# Patient Record
Sex: FEMALE | Race: WHITE | NOT HISPANIC OR LATINO | Employment: FULL TIME | URBAN - METROPOLITAN AREA
[De-identification: names, ages, dates, MRNs, and addresses within clinical notes are randomized per-mention and may not be internally consistent; named-entity substitution may affect disease eponyms.]

---

## 2017-01-19 ENCOUNTER — TELEPHONE (OUTPATIENT)
Dept: FAMILY MEDICINE | Facility: CLINIC | Age: 52
End: 2017-01-19

## 2017-01-19 DIAGNOSIS — I10 HYPERTENSION GOAL BP (BLOOD PRESSURE) < 140/90: Primary | ICD-10-CM

## 2017-01-19 RX ORDER — LOSARTAN POTASSIUM AND HYDROCHLOROTHIAZIDE 25; 100 MG/1; MG/1
1 TABLET ORAL DAILY
Qty: 90 TABLET | Refills: 4 | Status: CANCELLED | OUTPATIENT
Start: 2017-01-19

## 2017-01-19 NOTE — TELEPHONE ENCOUNTER
losartan-hydrochlorothiazide (HYZAAR) 100-25 MG per tablet      Last Written Prescription Date: 1/18/16  Last Fill Quantity: 90, # refills: 4  Last Office Visit with G, P or [facility] prescribing provider: 1/18/16   Next 5 appointments (look out 90 days)     Feb 24, 2017 10:30 AM   PHYSICAL with Farida Cardona MD   [facility] ([facility]    [address] [address]   [phone]                   POTASSIUM   Date Value Ref Range Status   01/18/2016 4.3 3.4 - 5.3 mmol/L Final     CREATININE   Date Value Ref Range Status   01/18/2016 0.59 0.52 - 1.04 mg/dL Final     BP Readings from Last 3 Encounters:   01/18/16 138/80   02/25/15 142/86   12/01/14 136/74

## 2017-01-20 ENCOUNTER — MYC MEDICAL ADVICE (OUTPATIENT)
Dept: FAMILY MEDICINE | Facility: CLINIC | Age: 52
End: 2017-01-20

## 2017-01-20 DIAGNOSIS — I10 HYPERTENSION GOAL BP (BLOOD PRESSURE) < 140/90: Primary | ICD-10-CM

## 2017-01-20 NOTE — TELEPHONE ENCOUNTER
losartan-hydrochlorothiazide (HYZAAR) 100-25 MG per tablet      Last Written Prescription Date: 1/18/2016  Last Fill Quantity: 90, # refills: 4  Last Office Visit with [facility] or  Health prescribing provider: 1/18/2016   Next 5 appointments (look out 90 days)     Feb 24, 2017 10:30 AM   PHYSICAL with Farida Cardona MD   [facility]    [address] [address]   [phone]                   POTASSIUM   Date Value Ref Range Status   01/18/2016 4.3 3.4 - 5.3 mmol/L Final     CREATININE   Date Value Ref Range Status   01/18/2016 0.59 0.52 - 1.04 mg/dL Final     BP Readings from Last 3 Encounters:   01/18/16 138/80   02/25/15 142/86   12/01/14 136/74     carvedilol (COREG CR) 20 MG 24 hr capsule      Last Written Prescription Date: 12/16/2016  Last Fill Quantity: 90, # refills: 0  Last Office Visit with [facility] or  Health prescribing provider: 1/18/2016   Next 5 appointments (look out 90 days)     Feb 24, 2017 10:30 AM   PHYSICAL with Farida Cardona MD   [facility] ([facility]    [address] [address]   [phone]                   POTASSIUM   Date Value Ref Range Status   01/18/2016 4.3 3.4 - 5.3 mmol/L Final     CREATININE   Date Value Ref Range Status   01/18/2016 0.59 0.52 - 1.04 mg/dL Final     BP Readings from Last 3 Encounters:   01/18/16 138/80   02/25/15 142/86   12/01/14 136/74

## 2017-01-24 RX ORDER — LOSARTAN POTASSIUM AND HYDROCHLOROTHIAZIDE 25; 100 MG/1; MG/1
1 TABLET ORAL DAILY
Qty: 90 TABLET | Refills: 0 | Status: SHIPPED | OUTPATIENT
Start: 2017-01-24 | End: 2017-02-24

## 2017-01-24 RX ORDER — CARVEDILOL PHOSPHATE 20 MG/1
20 CAPSULE, EXTENDED RELEASE ORAL DAILY
Qty: 90 CAPSULE | Refills: 0 | Status: SHIPPED | OUTPATIENT
Start: 2017-01-24 | End: 2017-02-24

## 2017-01-24 NOTE — TELEPHONE ENCOUNTER
Prescription approved per [facility] Refill Protocol.  Patient due for office visit for further refills. [employer] message sent to patient.     Sindhu Gonzalez RN

## 2017-02-21 NOTE — PATIENT INSTRUCTIONS
Preventive Health Recommendations  Female Ages 50 - 64    Yearly exam: See your health care provider every year in order to  o Review health changes.   o Discuss preventive care.    o Review your medicines if your doctor has prescribed any.      Get a Pap test every three years (unless you have an abnormal result and your provider advises testing more often).    If you get Pap tests with HPV test, you only need to test every 5 years, unless you have an abnormal result.     You do not need a Pap test if your uterus was removed (hysterectomy) and you have not had cancer.    You should be tested each year for STDs (sexually transmitted diseases) if you're at risk.     Have a mammogram every 1 to 2 years.    Have a colonoscopy at age 50, or have a yearly FIT test (stool test). These exams screen for colon cancer.      Have a cholesterol test every 5 years, or more often if advised.    Have a diabetes test (fasting glucose) every three years. If you are at risk for diabetes, you should have this test more often.     If you are at risk for osteoporosis (brittle bone disease), think about having a bone density scan (DEXA).    Shots: Get a flu shot each year. Get a tetanus shot every 10 years.    Nutrition:     Eat at least 5 servings of fruits and vegetables each day.    Eat whole-grain bread, whole-wheat pasta and brown rice instead of white grains and rice.    Talk to your provider about Calcium and Vitamin D.     Lifestyle    Exercise at least 150 minutes a week (30 minutes a day, 5 days a week). This will help you control your weight and prevent disease.    Limit alcohol to one drink per day.    No smoking.     Wear sunscreen to prevent skin cancer.     See your dentist every six months for an exam and cleaning.    See your eye doctor every 1 to 2 years.    [facility]    If you have any questions regarding to your visit please contact your care team:       Team Purple:   Clinic Hours Telephone Number    [address] Dr.   7am-7pm  Monday - Thursday   7am-5pm  Fridays  [phone]  (Appointment scheduling available 24/7)    Questions about your Visit?   Team Line:  [phone]   [facility] [place] [facility] 11am-9pm Monday-Friday Saturday-Sunday- 9am-5pm   [place] - 5pm-9pm Monday-Friday Saturday-Sunday- 9am-5pm  [phone] - [place]   [phone] - [place]       What options do I have for visits at the clinic other than the traditional office visit?  To expand how we care for you, many of our providers are utilizing electronic visits (e-visits) and telephone visits, when medically appropriate, for interactions with their patients rather than a visit in the clinic.   We also offer nurse visits for many medical concerns. Just like any other service, we will bill your insurance company for this type of visit based on time spent on the phone with your provider. Not all insurance companies cover these visits. Please check with your medical insurance if this type of visit is covered. You will be responsible for any charges that are not paid by your insurance.      E-visits via [employer]:  generally incur a $35.00 fee.  Telephone visits:  Time spent on the phone: *charged based on time that is spent on the phone in increments of 10 minutes. Estimated cost:   5-10 mins $30.00   11-20 mins. $59.00   21-30 mins. $85.00     Use [employer] (secure email communication and access to your chart) to send your primary care provider a message or make an appointment. Ask someone on your Team how to sign up for [employer].  For a Price Quote for your services, please call our Consumer Price Line at [phone].  As always, Thank you for trusting us with your health care needs!

## 2017-02-21 NOTE — PROGRESS NOTES
SUBJECTIVE:     CC: Juanita Lechuga is an 51 year old woman who presents for preventive health visit.     Healthy Habits:    Do you get at least three servings of calcium containing foods daily (dairy, green leafy vegetables, etc.)? yes    Amount of exercise or daily activities, outside of work: 0 day(s) per week    Problems taking medications regularly No    Medication side effects: No    Have you had an eye exam in the past two years? yes    Do you see a dentist twice per year? yes    Do you have sleep apnea, excessive snoring or daytime drowsiness?no             Today's PHQ-2 Score:   PHQ-2 ( 1999 [employer]) 2/24/2017 1/18/2016   Q1: Little interest or pleasure in doing things 0 0   Q2: Feeling down, depressed or hopeless 0 0   PHQ-2 Score 0 0   Little interest or pleasure in doing things - -   Feeling down, depressed or hopeless - -   PHQ-2 Score - -       Abuse: Current or Past(Physical, Sexual or Emotional)- No  Do you feel safe in your environment - Yes    Social History   Substance Use Topics     Smoking status: Former Smoker     Packs/day: 0.50     Years: 27.00     Types: Cigarettes     Quit date: 10/24/2011     Smokeless tobacco: Never Used     Alcohol use Yes      Comment: rare     The patient does not drink >3 drinks per day nor >7 drinks per week.    Recent Labs   Lab Test  01/18/16   1057  12/11/12   0702  09/07/10   0724   CHOL  197  158  177   HDL  72  44*  48*   LDL  113*  103  117   TRIG  62  53  61   CHOLHDLRATIO   --   3.6  3.7   NHDL  125   --    --        Reviewed orders with patient.  Reviewed health maintenance and updated orders accordingly - Yes    Mammo Decision Support:  Patient over age 50, mutual decision to screen reflected in health maintenance.    Pertinent mammograms are reviewed under the imaging tab.  History of abnormal Pap smear: NO - age 30-65 PAP every 5 years with negative HPV co-testing recommended  All Histories reviewed and updated in Epic.    Immunization History    Administered Date(s) Administered     Hepatitis A Vac Ped/Adol-2 Dose 05/08/2007, 06/06/2008, 06/22/2009     Influenza (IIV3) 09/25/2011, 10/05/2012, 11/13/2013, 10/15/2014, 11/20/2015, 11/02/2016     Pneumococcal 23 valent 03/07/2012, 03/08/2013     Tdap (Adacel,Boostrix) 05/08/2007, 06/06/2008      ROS:  This 51 year old female is here today for annual female exam. She has gained 20 pounds since last year and she is finally getting back into her good mind set to lose weight and start water exercises again. She is in a friend's wedding in August and needs to lose weight so she is motivated. She admits to eating pizza last night which is high is salt. She usually doesn't eat a lot of pizza. She makes her own protein bars from scratch and is back on a weight loss program that involves drinking a lot of green tea. She used to swim competitively in high school and in the community where she grew up in [place]. She knows she needs to get back into the pool. Hot flashes are bothering her so swimming is the best exercise for her.  All other review of systems are negative  Personal, family, and social history reviewed with patient and revised.    C: NEGATIVE for fever, chills, change in weight  I: NEGATIVE for worrisome rashes, moles or lesions  E: NEGATIVE for vision changes or irritation  ENT: NEGATIVE for ear, mouth and throat problems  R: NEGATIVE for significant cough or SOB  B: NEGATIVE for masses, tenderness or discharge  CV: NEGATIVE for chest pain, palpitations or peripheral edema  GI: NEGATIVE for nausea, abdominal pain, heartburn, or change in bowel habits  : NEGATIVE for unusual urinary or vaginal symptoms. No vaginal bleeding.  M: NEGATIVE for significant arthralgias or myalgia  N: NEGATIVE for weakness, dizziness or paresthesias  P: NEGATIVE for changes in mood or affect     Problem list, Medication list, Allergies, and Medical/Social/Surgical histories reviewed in EPIC and updated as appropriate.  Labs  reviewed in EPIC  BP Readings from Last 3 Encounters:   02/24/17 142/80   01/18/16 138/80   02/25/15 142/86    Wt Readings from Last 3 Encounters:   02/24/17 197 lb 9.6 oz (89.6 kg)   01/18/16 178 lb 8 oz (81 kg)   02/25/15 185 lb (83.9 kg)                  Patient Active Problem List   Diagnosis     Old disruption of anterior cruciate ligament     CARDIOVASCULAR SCREENING; LDL GOAL LESS THAN 130     Advanced directives, counseling/discussion     Situational mixed anxiety and depressive disorder     Adjustment disorder with mixed anxiety and depressed mood     Anxiety     Past Surgical History   Procedure Laterality Date     Bunionectomy rt/lt  8/7/2008     (L) first toe     Bunionectomy rt/lt  9/20/12     right first toe       Social History   Substance Use Topics     Smoking status: Former Smoker     Packs/day: 0.50     Years: 27.00     Types: Cigarettes     Quit date: 10/24/2011     Smokeless tobacco: Never Used     Alcohol use Yes      Comment: rare     Family History   Problem Relation Age of Onset     C.A.D. Father      Coronary Artery Disease Father      CANCER Maternal Grandmother          Current Outpatient Prescriptions   Medication Sig Dispense Refill     carvedilol (COREG CR) 20 MG 24 hr capsule Take 1 capsule (20 mg) by mouth daily 90 capsule 4     losartan-hydrochlorothiazide (HYZAAR) 100-25 MG per tablet Take 1 tablet by mouth daily 90 tablet 4     fluticasone (FLONASE) 50 MCG/ACT spray USE ONE TO TWO SPRAY(S) IN EACH NOSTRIL ONCE DAILY 1 Bottle 3     citalopram (CELEXA) 20 MG tablet Take 0.5 tablets (10 mg) by mouth daily 45 tablet 4     MULTI-VITAMIN OR TABS 1 TABLET DAILY       [DISCONTINUED] carvedilol (COREG CR) 20 MG 24 hr capsule Take 1 capsule (20 mg) by mouth daily 90 capsule 0     [DISCONTINUED] losartan-hydrochlorothiazide (HYZAAR) 100-25 MG per tablet Take 1 tablet by mouth daily 90 tablet 0     [DISCONTINUED] citalopram (CELEXA) 20 MG tablet Take 0.5 tablets (10 mg) by mouth daily 45  "tablet 4     OBJECTIVE:     /80  Pulse 74  Temp 98.1  F (36.7  C)  Ht 5' 2\" (1.575 m)  Wt 197 lb 9.6 oz (89.6 kg)  SpO2 97%  BMI 36.14 kg/m2  EXAM:  GENERAL APPEARANCE: healthy, alert and no distress, but she has an anxious rapid speech. This is her norm  EYES: Eyes grossly normal to inspection, PERRL and conjunctivae and sclerae normal  HENT: ear canals and TM's normal, nose and mouth without ulcers or lesions, oropharynx clear and oral mucous membranes moist  NECK: no adenopathy, no asymmetry, masses, or scars and thyroid normal to palpation  RESP: lungs clear to auscultation - no rales, rhonchi or wheezes  BREAST: normal without masses, tenderness or nipple discharge and no palpable axillary masses or adenopathy  CV: regular rate and rhythm, normal S1 S2, no S3 or S4, no murmur, click or rub, no peripheral edema and peripheral pulses strong  ABDOMEN: soft, nontender, no hepatosplenomegaly, no masses and bowel sounds normal   (female): normal female external genitalia, normal urethral meatus, vaginal mucosal atrophy noted, normal cervix, adnexae, and uterus without masses or abnormal discharge  MS: no musculoskeletal defects are noted and gait is age appropriate without ataxia  SKIN: no suspicious lesions or rashes  NEURO: Normal strength and tone, sensory exam grossly normal, mentation intact and speech normal  PSYCH: mentation appears normal and affect normal/bright    ASSESSMENT/PLAN:     1. Encounter for routine adult health examination without abnormal findings  Healthy but very overweight. She seems motivated this time with a friend's wedding coming up     2. Seasonal allergic rhinitis, unspecified allergic rhinitis trigger  Good control   - fluticasone (FLONASE) 50 MCG/ACT spray; USE ONE TO TWO SPRAY(S) IN EACH NOSTRIL ONCE DAILY  Dispense: 1 Bottle; Refill: 3    3. Situational mixed anxiety and depressive disorder  Good control   PHQ-9 score:    PHQ-9 SCORE 2/24/2017   Total Score -   Total Score 0 " "    - citalopram (CELEXA) 20 MG tablet; Take 0.5 tablets (10 mg) by mouth daily  Dispense: 45 tablet; Refill: 4    4. Benign essential hypertension  Fair control. She is on plenty of medication. Now she needs to improve her diet and reduce salt intake   - carvedilol (COREG CR) 20 MG 24 hr capsule; Take 1 capsule (20 mg) by mouth daily  Dispense: 90 capsule; Refill: 4  - losartan-hydrochlorothiazide (HYZAAR) 100-25 MG per tablet; Take 1 tablet by mouth daily  Dispense: 90 tablet; Refill: 4  - Basic metabolic panel  - TSH with free T4 reflex    5. Anxiety  Good control   - citalopram (CELEXA) 20 MG tablet; Take 0.5 tablets (10 mg) by mouth daily  Dispense: 45 tablet; Refill: 4    6. CARDIOVASCULAR SCREENING; LDL GOAL LESS THAN 130  She is not fasting today. She had a banana and protein bar  - Lipid panel reflex to direct LDL  - TSH with free T4 reflex    7. Need for hepatitis C screening test  due  - Hepatitis C Screen Reflex to HCV RNA Quant and Genotype    8. Screen for colon cancer  due  - GASTROENTEROLOGY ADULT REF PROCEDURE ONLY    9. Screening for malignant neoplasm of cervix  due  - Pap imaged thin layer screen with HPV - recommended age 30 - 65 years (select HPV order below)  - HPV High Risk Types DNA Cervical    COUNSELING:   Reviewed preventive health counseling, as reflected in patient instructions       Regular exercise       Healthy diet/nutrition         reports that she quit smoking about 5 years ago. Her smoking use included Cigarettes. She has a 13.50 pack-year smoking history. She has never used smokeless tobacco.    Estimated body mass index is 36.14 kg/(m^2) as calculated from the following:    Height as of this encounter: 5' 2\" (1.575 m).    Weight as of this encounter: 197 lb 9.6 oz (89.6 kg).   Weight management plan: Discussed healthy diet and exercise guidelines and patient will follow up in 6 months in clinic to re-evaluate.    Counseling Resources:  ATP IV Guidelines  Pooled Cohorts Equation " Calculator  Breast Cancer Risk Calculator  FRAX Risk Assessment  ICSI Preventive Guidelines  Dietary Guidelines for Americans, 2010  [employer]'s MyPlate  ASA Prophylaxis  Lung CA Screening    LUCIANO KIM MD  [facility]

## 2017-02-24 ENCOUNTER — OFFICE VISIT (OUTPATIENT)
Dept: FAMILY MEDICINE | Facility: CLINIC | Age: 52
End: 2017-02-24
Payer: COMMERCIAL

## 2017-02-24 ENCOUNTER — RADIANT APPOINTMENT (OUTPATIENT)
Dept: MAMMOGRAPHY | Facility: CLINIC | Age: 52
End: 2017-02-24
Attending: FAMILY MEDICINE
Payer: COMMERCIAL

## 2017-02-24 VITALS
SYSTOLIC BLOOD PRESSURE: 142 MMHG | TEMPERATURE: 98.1 F | OXYGEN SATURATION: 97 % | DIASTOLIC BLOOD PRESSURE: 80 MMHG | WEIGHT: 197.6 LBS | BODY MASS INDEX: 36.36 KG/M2 | HEIGHT: 62 IN | HEART RATE: 74 BPM

## 2017-02-24 DIAGNOSIS — Z12.11 SCREEN FOR COLON CANCER: ICD-10-CM

## 2017-02-24 DIAGNOSIS — I10 BENIGN ESSENTIAL HYPERTENSION: ICD-10-CM

## 2017-02-24 DIAGNOSIS — F41.9 ANXIETY: ICD-10-CM

## 2017-02-24 DIAGNOSIS — Z12.4 SCREENING FOR MALIGNANT NEOPLASM OF CERVIX: ICD-10-CM

## 2017-02-24 DIAGNOSIS — Z13.6 CARDIOVASCULAR SCREENING; LDL GOAL LESS THAN 130: ICD-10-CM

## 2017-02-24 DIAGNOSIS — E66.09 NON MORBID OBESITY DUE TO EXCESS CALORIES: ICD-10-CM

## 2017-02-24 DIAGNOSIS — F43.23 SITUATIONAL MIXED ANXIETY AND DEPRESSIVE DISORDER: ICD-10-CM

## 2017-02-24 DIAGNOSIS — Z12.31 VISIT FOR SCREENING MAMMOGRAM: ICD-10-CM

## 2017-02-24 DIAGNOSIS — Z00.00 ENCOUNTER FOR ROUTINE ADULT HEALTH EXAMINATION WITHOUT ABNORMAL FINDINGS: Primary | ICD-10-CM

## 2017-02-24 DIAGNOSIS — Z11.59 NEED FOR HEPATITIS C SCREENING TEST: ICD-10-CM

## 2017-02-24 DIAGNOSIS — J30.2 SEASONAL ALLERGIC RHINITIS, UNSPECIFIED ALLERGIC RHINITIS TRIGGER: ICD-10-CM

## 2017-02-24 LAB
ANION GAP SERPL CALCULATED.3IONS-SCNC: 5 MMOL/L (ref 3–14)
BUN SERPL-MCNC: 16 MG/DL (ref 7–30)
CALCIUM SERPL-MCNC: 9.1 MG/DL (ref 8.5–10.1)
CHLORIDE SERPL-SCNC: 103 MMOL/L (ref 94–109)
CHOLEST SERPL-MCNC: 209 MG/DL
CO2 SERPL-SCNC: 29 MMOL/L (ref 20–32)
CREAT SERPL-MCNC: 0.48 MG/DL (ref 0.52–1.04)
GFR SERPL CREATININE-BSD FRML MDRD: ABNORMAL ML/MIN/1.7M2
GLUCOSE SERPL-MCNC: 92 MG/DL (ref 70–99)
HCV AB SERPL QL IA: NORMAL
HDLC SERPL-MCNC: 80 MG/DL
LDLC SERPL CALC-MCNC: 115 MG/DL
NONHDLC SERPL-MCNC: 129 MG/DL
POTASSIUM SERPL-SCNC: 3.9 MMOL/L (ref 3.4–5.3)
SODIUM SERPL-SCNC: 137 MMOL/L (ref 133–144)
TRIGL SERPL-MCNC: 71 MG/DL
TSH SERPL DL<=0.005 MIU/L-ACNC: 1.84 MU/L (ref 0.4–4)

## 2017-02-24 PROCEDURE — G0145 SCR C/V CYTO,THINLAYER,RESCR: HCPCS | Performed by: FAMILY MEDICINE

## 2017-02-24 PROCEDURE — 84443 ASSAY THYROID STIM HORMONE: CPT | Performed by: FAMILY MEDICINE

## 2017-02-24 PROCEDURE — G0202 SCR MAMMO BI INCL CAD: HCPCS | Mod: TC

## 2017-02-24 PROCEDURE — 87624 HPV HI-RISK TYP POOLED RSLT: CPT | Performed by: FAMILY MEDICINE

## 2017-02-24 PROCEDURE — 80048 BASIC METABOLIC PNL TOTAL CA: CPT | Performed by: FAMILY MEDICINE

## 2017-02-24 PROCEDURE — 86803 HEPATITIS C AB TEST: CPT | Performed by: FAMILY MEDICINE

## 2017-02-24 PROCEDURE — 99396 PREV VISIT EST AGE 40-64: CPT | Performed by: FAMILY MEDICINE

## 2017-02-24 PROCEDURE — 80061 LIPID PANEL: CPT | Performed by: FAMILY MEDICINE

## 2017-02-24 PROCEDURE — 36415 COLL VENOUS BLD VENIPUNCTURE: CPT | Performed by: FAMILY MEDICINE

## 2017-02-24 RX ORDER — LOSARTAN POTASSIUM AND HYDROCHLOROTHIAZIDE 25; 100 MG/1; MG/1
1 TABLET ORAL DAILY
Qty: 90 TABLET | Refills: 4 | Status: SHIPPED | OUTPATIENT
Start: 2017-02-24 | End: 2018-03-23

## 2017-02-24 RX ORDER — FLUTICASONE PROPIONATE 50 MCG
SPRAY, SUSPENSION (ML) NASAL
Qty: 1 BOTTLE | Refills: 3 | Status: SHIPPED | OUTPATIENT
Start: 2017-02-24 | End: 2018-04-08

## 2017-02-24 RX ORDER — CARVEDILOL PHOSPHATE 20 MG/1
20 CAPSULE, EXTENDED RELEASE ORAL DAILY
Qty: 90 CAPSULE | Refills: 4 | Status: SHIPPED | OUTPATIENT
Start: 2017-02-24 | End: 2018-03-21

## 2017-02-24 RX ORDER — CITALOPRAM HYDROBROMIDE 20 MG/1
10 TABLET ORAL DAILY
Qty: 45 TABLET | Refills: 4 | Status: SHIPPED | OUTPATIENT
Start: 2017-02-24 | End: 2017-10-05

## 2017-02-24 NOTE — LETTER
My Depression Action Plan  Name: Juanita Lechuga   Date of Birth 1965  Date: 2/24/2017    My doctor: Farida Cardona   My clinic: [address]  [place] [address]  [phone]          GREEN    ZONE   Good Control    What it looks like:     Things are going generally well. You have normal up s and down s. You may even feel depressed from time to time, but bad moods usually last less than a day.   What you need to do:  1. Continue to care for yourself (see self care plan)  2. Check your depression survival kit and update it as needed  3. Follow your physician s recommendations including any medication.  4. Do not stop taking medication unless you consult with your physician first.           YELLOW         ZONE Getting Worse    What it looks like:     Depression is starting to interfere with your life.     It may be hard to get out of bed; you may be starting to isolate yourself from others.    Symptoms of depression are starting to last most all day and this has happened for several days.     You may have suicidal thoughts but they are not constant.   What you need to do:     1. Call your care team, your response to treatment will improve if you keep your care team informed of your progress. Yellow periods are signs an adjustment may need to be made.     2. Continue your self-care, even if you have to fake it!    3. Talk to someone in your support network    4. Open up your depression survival kit           RED    ZONE Medical Alert - Get Help    What it looks like:     Depression is seriously interfering with your life.     You may experience these or other symptoms: You can t get out of bed most days, can t work or engage in other necessary activities, you have trouble taking care of basic hygiene, or basic responsibilities, thoughts of suicide or death that will not go away, self-injurious behavior.     What you need to do:  1. Call your care team and  request a same-day appointment. If they are not available (weekends or after hours) call your local crisis line, emergency room or 911.      Electronically signed by: LUCIANO KIM, February 24, 2017    Depression Self Care Plan / Survival Kit    Self-Care for Depression  Here s the deal. Your body and mind are really not as separate as most people think.  What you do and think affects how you feel and how you feel influences what you do and think. This means if you do things that people who feel good do, it will help you feel better.  Sometimes this is all it takes.  There is also a place for medication and therapy depending on how severe your depression is, so be sure to consult with your medical provider and/ or Behavioral Health Consultant if your symptoms are worsening or not improving.     In order to better manage my stress, I will:    Exercise  Get some form of exercise, every day. This will help reduce pain and release endorphins, the  feel good  chemicals in your brain. This is almost as good as taking antidepressants!  This is not the same as joining a gym and then never going! (they count on that by the way ) It can be as simple as just going for a walk or doing some gardening, anything that will get you moving.      Hygiene   Maintain good hygiene (Get out of bed in the morning, Make your bed, Brush your teeth, Take a shower, and Get dressed like you were going to work, even if you are unemployed).  If your clothes don't fit try to get ones that do.    Diet  I will strive to eat foods that are good for me, drink plenty of water, and avoid excessive sugar, caffeine, alcohol, and other mood-altering substances.  Some foods that are helpful in depression are: complex carbohydrates, B vitamins, flaxseed, fish or fish oil, fresh fruits and vegetables.    Psychotherapy  I agree to participate in Individual Therapy (if recommended).    Medication  If prescribed medications, I agree to take them.  Missing  doses can result in serious side effects.  I understand that drinking alcohol, or other illicit drug use, may cause potential side effects.  I will not stop my medication abruptly without first discussing it with my provider.    Staying Connected With Others  I will stay in touch with my friends, family members, and my primary care provider/team.    Use your imagination  Be creative.  We all have a creative side; it doesn t matter if it s oil painting, sand castles, or mud pies! This will also kick up the endorphins.    Witness Beauty  (AKA stop and smell the roses) Take a look outside, even in mid-winter. Notice colors, textures. Watch the squirrels and birds.     Service to others  Be of service to others.  There is always someone else in need.  By helping others we can  get out of ourselves  and remember the really important things.  This also provides opportunities for practicing all the other parts of the program.    Humor  Laugh and be silly!  Adjust your TV habits for less news and crime-drama and more comedy.    Control your stress  Try breathing deep, massage therapy, biofeedback, and meditation. Find time to relax each day.     My support system    Clinic Contact:  Phone number:    Contact 1:  Phone number:    Contact 2:  Phone number:    Sabianist/:  Phone number:    Therapist:  Phone number:    Local crisis center:    Phone number:    Other community support:  Phone number:

## 2017-02-24 NOTE — NURSING NOTE
"Chief Complaint   Patient presents with     Physical       Initial /80 (BP Location: Right arm, Patient Position: Chair, Cuff Size: Adult Large)  Pulse 74  Temp 98.1  F (36.7  C)  Ht 5' 2\" (1.575 m)  Wt 197 lb 9.6 oz (89.6 kg)  SpO2 97%  BMI 36.14 kg/m2 Estimated body mass index is 36.14 kg/(m^2) as calculated from the following:    Height as of this encounter: 5' 2\" (1.575 m).    Weight as of this encounter: 197 lb 9.6 oz (89.6 kg).  Medication Reconciliation: complete   Marilu Dsouza MA      "

## 2017-02-24 NOTE — LETTER
[facility]  [address] [place] [address]    February 27, 2017    Juanita Lechuga  [address]  [place] [address]          Dear Juanita,    All of your lab tests are looking very good. I wish you well with you new diet plans.     Enclosed is a copy of your results.     Results for orders placed or performed in visit on 02/24/17   Basic metabolic panel   Result Value Ref Range    Sodium 137 133 - 144 mmol/L    Potassium 3.9 3.4 - 5.3 mmol/L    Chloride 103 94 - 109 mmol/L    Carbon Dioxide 29 20 - 32 mmol/L    Anion Gap 5 3 - 14 mmol/L    Glucose 92 70 - 99 mg/dL    Urea Nitrogen 16 7 - 30 mg/dL    Creatinine 0.48 (L) 0.52 - 1.04 mg/dL    GFR Estimate >90  Non  GFR Calc   >60 mL/min/1.7m2    GFR Estimate If Black >90   GFR Calc   >60 mL/min/1.7m2    Calcium 9.1 8.5 - 10.1 mg/dL   Lipid panel reflex to direct LDL   Result Value Ref Range    Cholesterol 209 (H) <200 mg/dL    Triglycerides 71 <150 mg/dL    HDL Cholesterol 80 >49 mg/dL    LDL Cholesterol Calculated 115 (H) <100 mg/dL    Non HDL Cholesterol 129 <130 mg/dL   TSH with free T4 reflex   Result Value Ref Range    TSH 1.84 0.40 - 4.00 mU/L   Hepatitis C Screen Reflex to HCV RNA Quant and Genotype   Result Value Ref Range    Hepatitis C Antibody  NR     Nonreactive   Assay performance characteristics have not been established for newborns,   infants, and children         If you have any questions or concerns, please call myself or my nurse at [phone].      Sincerely,        Farida Cardona MD/ROBERT

## 2017-02-24 NOTE — LETTER
[facility]  [address] [facility]  [place] [address]  [phone]      February 27, 2017      Juanita Lechuga  [address]  [facility] [address]        Dear Juanita,    Please make an appointment to see me again prior to your trip to [facility] this summer. I want to make sure your blood pressure is under better control prior to your travel. I wish you well.           Sincerely,      LUCIANO KIM M.D.

## 2017-02-24 NOTE — MR AVS SNAPSHOT
After Visit Summary   2/24/2017    Juanita Lechuga    MRN: [id number]           Patient Information     Date Of Birth          1965        Visit Information        Provider Department      2/24/2017 10:30 AM Farida Cardona MD [facility]        Today's Diagnoses     Encounter for routine adult health examination without abnormal findings    -  1    Seasonal allergic rhinitis, unspecified allergic rhinitis trigger        Situational mixed anxiety and depressive disorder        Benign essential hypertension        Anxiety        CARDIOVASCULAR SCREENING; LDL GOAL LESS THAN 130        Need for hepatitis C screening test        Screen for colon cancer        Screening for malignant neoplasm of cervix          Care Instructions      Preventive Health Recommendations  Female Ages 50 - 64    Yearly exam: See your health care provider every year in order to  o Review health changes.   o Discuss preventive care.    o Review your medicines if your doctor has prescribed any.      Get a Pap test every three years (unless you have an abnormal result and your provider advises testing more often).    If you get Pap tests with HPV test, you only need to test every 5 years, unless you have an abnormal result.     You do not need a Pap test if your uterus was removed (hysterectomy) and you have not had cancer.    You should be tested each year for STDs (sexually transmitted diseases) if you're at risk.     Have a mammogram every 1 to 2 years.    Have a colonoscopy at age 50, or have a yearly FIT test (stool test). These exams screen for colon cancer.      Have a cholesterol test every 5 years, or more often if advised.    Have a diabetes test (fasting glucose) every three years. If you are at risk for diabetes, you should have this test more often.     If you are at risk for osteoporosis (brittle bone disease), think about having a bone density scan (DEXA).    Shots: Get a flu shot each year. Get  a tetanus shot every 10 years.    Nutrition:     Eat at least 5 servings of fruits and vegetables each day.    Eat whole-grain bread, whole-wheat pasta and brown rice instead of white grains and rice.    Talk to your provider about Calcium and Vitamin D.     Lifestyle    Exercise at least 150 minutes a week (30 minutes a day, 5 days a week). This will help you control your weight and prevent disease.    Limit alcohol to one drink per day.    No smoking.     Wear sunscreen to prevent skin cancer.     See your dentist every six months for an exam and cleaning.    See your eye doctor every 1 to 2 years.    [facility]    If you have any questions regarding to your visit please contact your care team:       Team Purple:   Clinic Hours Telephone Number   [address] Dr.   7am-7pm  Monday - Thursday   7am-5pm  Fridays  [phone]  (Appointment scheduling available 24/7)    Questions about your Visit?   Team Line:  [phone]   Urgent Care - [place] [facility] - 11am-9pm Monday-Friday Saturday-Sunday- 9am-5pm   [place] - 5pm-9pm Monday-Friday Saturday-Sunday- 9am-5pm  [phone] - [place]   [phone] - [place]       What options do I have for visits at the clinic other than the traditional office visit?  To expand how we care for you, many of our providers are utilizing electronic visits (e-visits) and telephone visits, when medically appropriate, for interactions with their patients rather than a visit in the clinic.   We also offer nurse visits for many medical concerns. Just like any other service, we will bill your insurance company for this type of visit based on time spent on the phone with your provider. Not all insurance companies cover these visits. Please check with your medical insurance if this type of visit is covered. You will be responsible for any charges that are not paid by your insurance.       E-visits via [employer]:  generally incur a $35.00 fee.  Telephone visits:  Time spent on the phone: *charged based on time that is spent on the phone in increments of 10 minutes. Estimated cost:   5-10 mins $30.00   11-20 mins. $59.00   21-30 mins. $85.00     Use [employer] (secure email communication and access to your chart) to send your primary care provider a message or make an appointment. Ask someone on your Team how to sign up for [employer].  For a Price Quote for your services, please call our magnetic.io Line at [phone].  As always, Thank you for trusting us with your health care needs!              Follow-ups after your visit        Additional Services     GASTROENTEROLOGY ADULT REF PROCEDURE ONLY       Last Lab Result: Creatinine (mg/dL)       Date                     Value                 01/18/2016               0.59             ----------  Body mass index is 36.14 kg/(m^2).      Patient will be contacted to schedule procedure.     Please be aware that coverage of these services is subject to the terms and limitations of your health insurance plan.  Call member services at your health plan with any benefit or coverage questions.  Any procedures must be performed at a [place] facility OR coordinated by your clinic's referral office.    Please bring the following with you to your appointment:    (1) Any X-Rays, CTs or MRIs which have been performed.  Contact the facility where they were done to arrange for  prior to your scheduled appointment.    (2) List of current medications   (3) This referral request   (4) Any documents/labs given to you for this referral                  Who to contact     If you have questions or need follow up information about today's clinic visit or your schedule please contact [facility] directly at [phone].  Normal or non-critical lab and imaging results will be communicated to you by MyChart, letter or phone within 4 business days after the  "clinic has received the results. If you do not hear from us within 7 days, please contact the clinic through [employer] or phone. If you have a critical or abnormal lab result, we will notify you by phone as soon as possible.  Submit refill requests through [employer] or call your pharmacy and they will forward the refill request to us. Please allow 3 business days for your refill to be completed.          Additional Information About Your Visit        [employer] Information     [employer] gives you secure access to your electronic health record. If you see a primary care provider, you can also send messages to your care team and make appointments. If you have questions, please call your primary care clinic.  If you do not have a primary care provider, please call [phone] and they will assist you.        Care EveryWhere ID     This is your Care EveryWhere ID. This could be used by other organizations to access your [place] medical records  [id number]        Your Vitals Were     Pulse Temperature Height Pulse Oximetry BMI (Body Mass Index)       74 98.1  F (36.7  C) 5' 2\" (1.575 m) 97% 36.14 kg/m2        Blood Pressure from Last 3 Encounters:   02/24/17 168/80   01/18/16 138/80   02/25/15 142/86    Weight from Last 3 Encounters:   02/24/17 197 lb 9.6 oz (89.6 kg)   01/18/16 178 lb 8 oz (81 kg)   02/25/15 185 lb (83.9 kg)              We Performed the Following     Basic metabolic panel     GASTROENTEROLOGY ADULT REF PROCEDURE ONLY     Hepatitis C Screen Reflex to HCV RNA Quant and Genotype     HPV High Risk Types DNA Cervical     Lipid panel reflex to direct LDL     Pap imaged thin layer screen with HPV - recommended age 30 - 65 years (select HPV order below)     TSH with free T4 reflex          Today's Medication Changes          These changes are accurate as of: 2/24/17 10:49 AM.  If you have any questions, ask your nurse or doctor.               These medicines have changed or have updated prescriptions.        " Dose/Directions    fluticasone 50 MCG/ACT spray   Commonly known as:  FLONASE   This may have changed:  See the new instructions.   Used for:  Seasonal allergic rhinitis, unspecified allergic rhinitis trigger   Changed by:  Farida Cardona MD        USE ONE TO TWO SPRAY(S) IN EACH NOSTRIL ONCE DAILY   Quantity:  1 Bottle   Refills:  3            Where to get your medicines      These medications were sent to [address] [address] [facility]  [address] [facility] [address]     Phone:  [phone]     carvedilol 20 MG 24 hr capsule    citalopram 20 MG tablet    fluticasone 50 MCG/ACT spray    losartan-hydrochlorothiazide 100-25 MG per tablet                Primary Care Provider Office Phone # Fax #    Farida Cardona [phone]       [address] [address]        Thank you!     Thank you for choosing [facility]  for your care. Our goal is always to provide you with excellent care. Hearing back from our patients is one way we can continue to improve our services. Please take a few minutes to complete the written survey that you may receive in the mail after your visit with us. Thank you!             Your Updated Medication List - Protect others around you: Learn how to safely use, store and throw away your medicines at www.disposemymeds.org.          This list is accurate as of: 2/24/17 10:49 AM.  Always use your most recent med list.                   Brand Name Dispense Instructions for use    carvedilol 20 MG 24 hr capsule    COREG CR    90 capsule    Take 1 capsule (20 mg) by mouth daily       citalopram 20 MG tablet    celeXA    45 tablet    Take 0.5 tablets (10 mg) by mouth daily       fluticasone 50 MCG/ACT spray    FLONASE    1 Bottle    USE ONE TO TWO SPRAY(S) IN EACH NOSTRIL ONCE DAILY       losartan-hydrochlorothiazide 100-25 MG per tablet    HYZAAR    90 tablet    Take 1  tablet by mouth daily       Multi-vitamin Tabs tablet   Generic drug:  multivitamin, therapeutic with minerals      1 TABLET DAILY

## 2017-02-25 ASSESSMENT — PATIENT HEALTH QUESTIONNAIRE - PHQ9: SUM OF ALL RESPONSES TO PHQ QUESTIONS 1-9: 0

## 2017-03-01 LAB
COPATH REPORT: NORMAL
PAP: NORMAL

## 2017-03-02 LAB
FINAL DIAGNOSIS: NORMAL
HPV HR 12 DNA CVX QL NAA+PROBE: NEGATIVE
HPV16 DNA SPEC QL NAA+PROBE: NEGATIVE
HPV18 DNA SPEC QL NAA+PROBE: NEGATIVE
SPECIMEN DESCRIPTION: NORMAL

## 2017-06-08 NOTE — PROGRESS NOTES
SUBJECTIVE:                                                    Juanita Lechuga is a 51 year old female who presents to clinic today for the following health issues:      Hypertension Follow-up      Outpatient blood pressures are being checked at home.  Results are good.    Low Salt Diet: no added salt       Amount of exercise or physical activity: 2-3 days/week for an average of 45-60 minutes    Problems taking medications regularly: No    Medication side effects: none    Diet: regular (no restrictions) and low salt           Problem list and histories reviewed & adjusted, as indicated.  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     CARDIOVASCULAR SCREENING; LDL GOAL LESS THAN 130     Advanced directives, counseling/discussion     Situational mixed anxiety and depressive disorder     Adjustment disorder with mixed anxiety and depressed mood     Anxiety     Non morbid obesity due to excess calories     Past Surgical History:   Procedure Laterality Date     BUNIONECTOMY RT/LT  8/7/2008    (L) first toe     BUNIONECTOMY RT/LT  9/20/12    right first toe       Social History   Substance Use Topics     Smoking status: Former Smoker     Packs/day: 0.50     Years: 27.00     Types: Cigarettes     Quit date: 10/24/2011     Smokeless tobacco: Never Used     Alcohol use Yes      Comment: rare     Family History   Problem Relation Age of Onset     C.A.D. Father      Coronary Artery Disease Father      CANCER Maternal Grandmother          Recent Labs   Lab Test  02/24/17   1055  01/18/16   1057   12/11/12   0702   LDL  115*  113*   --   103   HDL  80  72   --   44*   TRIG  71  62   --   53   CR  0.48*  0.59   < >  0.46*   GFRESTIMATED  >90  Non  GFR Calc    >90  Non  GFR Calc     < >  >90   GFRESTBLACK  >90   GFR Calc    >90   GFR Calc     < >  >90   POTASSIUM  3.9  4.3   < >  4.2  "  TSH  1.84  2.71   < >   --     < > = values in this interval not displayed.      BP Readings from Last 3 Encounters:   06/22/17 138/80   02/24/17 142/80   01/18/16 138/80    Wt Readings from Last 3 Encounters:   06/22/17 197 lb (89.4 kg)   02/24/17 197 lb 9.6 oz (89.6 kg)   01/18/16 178 lb 8 oz (81 kg)                  Labs reviewed in EPIC    Reviewed and updated as needed this visit by clinical staff       Reviewed and updated as needed this visit by Provider          Allergies   Allergen Reactions     Lisinopril Cough       ROS:  This 51 year old female is here today to get her blood pressure rechecked. Since her last visit, she has started swimming 1 hour 3 X a week and feels much healthier. She is trying hard to eat less carbohydrates and very low salt diet. She has not lost any weight, however. Her blood pressure is better at home since she started swimming. She wishes she could lose more weight. She is going to [facility] to visit relatives for an entire month this summer. All other review of systems are negative  Personal, family, and social history reviewed with patient and revised.         OBJECTIVE:                                                    /80  Pulse 82  Temp 96.9  F (36.1  C)  Ht 5' 2\" (1.575 m)  Wt 197 lb (89.4 kg)  SpO2 99%  BMI 36.03 kg/m2  Body mass index is 36.03 kg/(m^2).  GENERAL: healthy, alert and no distress  NECK: no adenopathy, no asymmetry, masses, or scars and thyroid normal to palpation  RESP: lungs clear to auscultation - no rales, rhonchi or wheezes  CV: regular rate and rhythm, normal S1 S2, no S3 or S4, no murmur, click or rub, no peripheral edema   ABDOMEN: large truncal obesity   MS: no gross musculoskeletal defects noted, no edema  Obesity prevents her from walking smoothly   Well hydrated  Well nourished  Well groomed    Diagnostic Test Results:  none      ASSESSMENT/PLAN:                                                             1. Benign essential " hypertension  Improved control just with adding exercise     2. Metabolic syndrome X  As above   - metFORMIN (GLUCOPHAGE) 500 MG tablet; Take 1 tablet (500 mg) by mouth 2 times daily (with meals)  Dispense: 180 tablet; Refill: 3    3. Adjustment disorder with mixed anxiety and depressed mood  PHQ-9 score:    PHQ-9 SCORE 6/22/2017   Total Score -   Total Score 0     - DEPRESSION ACTION PLAN (DAP)    Return to clinic 3 months     LUCIANO KIM MD  [facility]

## 2017-06-22 ENCOUNTER — OFFICE VISIT (OUTPATIENT)
Dept: FAMILY MEDICINE | Facility: CLINIC | Age: 52
End: 2017-06-22
Payer: COMMERCIAL

## 2017-06-22 VITALS
WEIGHT: 197 LBS | HEIGHT: 62 IN | BODY MASS INDEX: 36.25 KG/M2 | SYSTOLIC BLOOD PRESSURE: 138 MMHG | DIASTOLIC BLOOD PRESSURE: 80 MMHG | HEART RATE: 82 BPM | TEMPERATURE: 96.9 F | OXYGEN SATURATION: 99 %

## 2017-06-22 DIAGNOSIS — I10 BENIGN ESSENTIAL HYPERTENSION: Primary | ICD-10-CM

## 2017-06-22 DIAGNOSIS — F43.23 ADJUSTMENT DISORDER WITH MIXED ANXIETY AND DEPRESSED MOOD: ICD-10-CM

## 2017-06-22 DIAGNOSIS — E88.810 METABOLIC SYNDROME X: ICD-10-CM

## 2017-06-22 PROCEDURE — 99213 OFFICE O/P EST LOW 20 MIN: CPT | Performed by: FAMILY MEDICINE

## 2017-06-22 NOTE — NURSING NOTE
"Chief Complaint   Patient presents with     RECHECK     HTN       Initial /84 (BP Location: Right arm, Patient Position: Chair, Cuff Size: Adult Large)  Pulse 82  Temp 96.9  F (36.1  C)  Ht 5' 2\" (1.575 m)  Wt 197 lb (89.4 kg)  SpO2 99%  BMI 36.03 kg/m2 Estimated body mass index is 36.03 kg/(m^2) as calculated from the following:    Height as of this encounter: 5' 2\" (1.575 m).    Weight as of this encounter: 197 lb (89.4 kg).  Medication Reconciliation: complete   Marilu Dsouza MA      "

## 2017-06-22 NOTE — LETTER
My Depression Action Plan  Name: Juanita Lechuga   Date of Birth 1965  Date: 6/22/2017    My doctor: Farida Cardona   My clinic: [address]  [place] [address]  [phone]          GREEN    ZONE   Good Control    What it looks like:     Things are going generally well. You have normal up s and down s. You may even feel depressed from time to time, but bad moods usually last less than a day.   What you need to do:  1. Continue to care for yourself (see self care plan)  2. Check your depression survival kit and update it as needed  3. Follow your physician s recommendations including any medication.  4. Do not stop taking medication unless you consult with your physician first.           YELLOW         ZONE Getting Worse    What it looks like:     Depression is starting to interfere with your life.     It may be hard to get out of bed; you may be starting to isolate yourself from others.    Symptoms of depression are starting to last most all day and this has happened for several days.     You may have suicidal thoughts but they are not constant.   What you need to do:     1. Call your care team, your response to treatment will improve if you keep your care team informed of your progress. Yellow periods are signs an adjustment may need to be made.     2. Continue your self-care, even if you have to fake it!    3. Talk to someone in your support network    4. Open up your depression survival kit           RED    ZONE Medical Alert - Get Help    What it looks like:     Depression is seriously interfering with your life.     You may experience these or other symptoms: You can t get out of bed most days, can t work or engage in other necessary activities, you have trouble taking care of basic hygiene, or basic responsibilities, thoughts of suicide or death that will not go away, self-injurious behavior.     What you need to do:  1. Call your care team and  request a same-day appointment. If they are not available (weekends or after hours) call your local crisis line, emergency room or 911.      Electronically signed by: LUCIANO KIM, June 22, 2017    Depression Self Care Plan / Survival Kit    Self-Care for Depression  Here s the deal. Your body and mind are really not as separate as most people think.  What you do and think affects how you feel and how you feel influences what you do and think. This means if you do things that people who feel good do, it will help you feel better.  Sometimes this is all it takes.  There is also a place for medication and therapy depending on how severe your depression is, so be sure to consult with your medical provider and/ or Behavioral Health Consultant if your symptoms are worsening or not improving.     In order to better manage my stress, I will:    Exercise  Get some form of exercise, every day. This will help reduce pain and release endorphins, the  feel good  chemicals in your brain. This is almost as good as taking antidepressants!  This is not the same as joining a gym and then never going! (they count on that by the way ) It can be as simple as just going for a walk or doing some gardening, anything that will get you moving.      Hygiene   Maintain good hygiene (Get out of bed in the morning, Make your bed, Brush your teeth, Take a shower, and Get dressed like you were going to work, even if you are unemployed).  If your clothes don't fit try to get ones that do.    Diet  I will strive to eat foods that are good for me, drink plenty of water, and avoid excessive sugar, caffeine, alcohol, and other mood-altering substances.  Some foods that are helpful in depression are: complex carbohydrates, B vitamins, flaxseed, fish or fish oil, fresh fruits and vegetables.    Psychotherapy  I agree to participate in Individual Therapy (if recommended).    Medication  If prescribed medications, I agree to take them.  Missing  doses can result in serious side effects.  I understand that drinking alcohol, or other illicit drug use, may cause potential side effects.  I will not stop my medication abruptly without first discussing it with my provider.    Staying Connected With Others  I will stay in touch with my friends, family members, and my primary care provider/team.    Use your imagination  Be creative.  We all have a creative side; it doesn t matter if it s oil painting, sand castles, or mud pies! This will also kick up the endorphins.    Witness Beauty  (AKA stop and smell the roses) Take a look outside, even in mid-winter. Notice colors, textures. Watch the squirrels and birds.     Service to others  Be of service to others.  There is always someone else in need.  By helping others we can  get out of ourselves  and remember the really important things.  This also provides opportunities for practicing all the other parts of the program.    Humor  Laugh and be silly!  Adjust your TV habits for less news and crime-drama and more comedy.    Control your stress  Try breathing deep, massage therapy, biofeedback, and meditation. Find time to relax each day.     My support system    Clinic Contact:  Phone number:    Contact 1:  Phone number:    Contact 2:  Phone number:    Islam/:  Phone number:    Therapist:  Phone number:    Local crisis center:    Phone number:    Other community support:  Phone number:

## 2017-06-22 NOTE — MR AVS SNAPSHOT
After Visit Summary   6/22/2017    Juanita Lechuga    MRN: [id number]           Patient Information     Date Of Birth          1965        Visit Information        Provider Department      6/22/2017 4:30 PM Farida Cardona MD [facility]        Today's Diagnoses     Benign essential hypertension    -  1    Metabolic syndrome X        Adjustment disorder with mixed anxiety and depressed mood          Care Instructions    [facility]    If you have any questions regarding to your visit please contact your care team:       Team Purple:   Clinic Hours Telephone Number   Dr. Farida Drake     7am-7pm  Monday - Thursday   7am-5pm  Fridays  [phone]  (Appointment scheduling available 24/7)    Questions about your Visit?   Team Line:  [phone]   Urgent Care - [place] and [facility] - 11am-9pm Monday-Friday Saturday-Sunday- 9am-5pm   [place] - 5pm-9pm Monday-Friday Saturday-Sunday- 9am-5pm  [phone] - [facility]  [phone] - [place]       What options do I have for visits at the clinic other than the traditional office visit?  To expand how we care for you, many of our providers are utilizing electronic visits (e-visits) and telephone visits, when medically appropriate, for interactions with their patients rather than a visit in the clinic.   We also offer nurse visits for many medical concerns. Just like any other service, we will bill your insurance company for this type of visit based on time spent on the phone with your provider. Not all insurance companies cover these visits. Please check with your medical insurance if this type of visit is covered. You will be responsible for any charges that are not paid by your insurance.      E-visits via XIPWIRE:  generally incur a $35.00 fee.  Telephone visits:  Time spent on the phone: *charged based on time that is spent on the phone in increments of 10  "minutes. Estimated cost:   5-10 mins $30.00   11-20 mins. $59.00   21-30 mins. $85.00     Use [employer] (secure email communication and access to your chart) to send your primary care provider a message or make an appointment. Ask someone on your Team how to sign up for [employer].  For a Price Quote for your services, please call our Green Momit Line at [phone].  As always, Thank you for trusting us with your health care needs!              Follow-ups after your visit        Who to contact     If you have questions or need follow up information about today's clinic visit or your schedule please contact [facility] directly at [phone].  Normal or non-critical lab and imaging results will be communicated to you by 5to1hart, letter or phone within 4 business days after the clinic has received the results. If you do not hear from us within 7 days, please contact the clinic through [employer] or phone. If you have a critical or abnormal lab result, we will notify you by phone as soon as possible.  Submit refill requests through [employer] or call your pharmacy and they will forward the refill request to us. Please allow 3 business days for your refill to be completed.          Additional Information About Your Visit        [employer] Information     [employer] gives you secure access to your electronic health record. If you see a primary care provider, you can also send messages to your care team and make appointments. If you have questions, please call your primary care clinic.  If you do not have a primary care provider, please call [phone] and they will assist you.        Care EveryWhere ID     This is your Care EveryWhere ID. This could be used by other organizations to access your [place] medical records  [id number]        Your Vitals Were     Pulse Temperature Height Pulse Oximetry BMI (Body Mass Index)       82 96.9  F (36.1  C) 5' 2\" (1.575 m) 99% 36.03 kg/m2        Blood Pressure from Last 3 " Encounters:   06/22/17 142/84   02/24/17 142/80   01/18/16 138/80    Weight from Last 3 Encounters:   06/22/17 197 lb (89.4 kg)   02/24/17 197 lb 9.6 oz (89.6 kg)   01/18/16 178 lb 8 oz (81 kg)              We Performed the Following     DEPRESSION ACTION PLAN (DAP)          Today's Medication Changes          These changes are accurate as of: 6/22/17  4:35 PM.  If you have any questions, ask your nurse or doctor.               Start taking these medicines.        Dose/Directions    metFORMIN 500 MG tablet   Commonly known as:  GLUCOPHAGE   Used for:  Metabolic syndrome X   Started by:  Farida Cardona MD        Dose:  500 mg   Take 1 tablet (500 mg) by mouth 2 times daily (with meals)   Quantity:  180 tablet   Refills:  3            Where to get your medicines      These medications [facility] [address] [facility]  [address] [facility] [address]     Phone:  [phone]     metFORMIN 500 MG tablet                Primary Care Provider Office Phone # Fax #    Farida Cardona [phone]       [facility] [place] [facility] [address] [place] [id number]        Equal Access to Services     JESSICA CASTELLANO AH: Jun osmano Soomaali, waaxda luqadaha, qaybta kaalmada adeegyada, loree strong. So [facility] [phone].    ATENCIÓN: Si habla español, tiene a hudson disposición servicios gratuitos de asistencia lingüística. Llblessing al [phone].    We comply with applicable federal civil rights laws and [place] laws. We do not discriminate on the basis of race, color, national origin, age, disability sex, sexual orientation or gender identity.            Thank you!     Thank you for choosing [facility]  for your care. Our goal is always to provide you with excellent care. Hearing back from our patients is one way we can continue to improve our services. Please take a few minutes to complete  the written survey that you may receive in the mail after your visit with us. Thank you!             Your Updated Medication List - Protect others around you: Learn how to safely use, store and throw away your medicines at www.disposemymeds.org.          This list is accurate as of: 6/22/17  4:35 PM.  Always use your most recent med list.                   Brand Name Dispense Instructions for use Diagnosis    carvedilol 20 MG 24 hr capsule    COREG CR    90 capsule    Take 1 capsule (20 mg) by mouth daily    Benign essential hypertension       citalopram 20 MG tablet    celeXA    45 tablet    Take 0.5 tablets (10 mg) by mouth daily    Situational mixed anxiety and depressive disorder, Anxiety       fluticasone 50 MCG/ACT spray    FLONASE    1 Bottle    USE ONE TO TWO SPRAY(S) IN EACH NOSTRIL ONCE DAILY    Seasonal allergic rhinitis, unspecified allergic rhinitis trigger       losartan-hydrochlorothiazide 100-25 MG per tablet    HYZAAR    90 tablet    Take 1 tablet by mouth daily    Benign essential hypertension       metFORMIN 500 MG tablet    GLUCOPHAGE    180 tablet    Take 1 tablet (500 mg) by mouth 2 times daily (with meals)    Metabolic syndrome X       Multi-vitamin Tabs tablet   Generic drug:  multivitamin, therapeutic with minerals      1 TABLET DAILY    Knee pain

## 2017-06-22 NOTE — PATIENT INSTRUCTIONS
[facility]    If you have any questions regarding to your visit please contact your care team:       Team Purple:   Clinic Hours Telephone Number   Dr. Farida Drake     7am-7pm  Monday - Thursday   7am-5pm  Fridays  [phone]  (Appointment scheduling available 24/7)    Questions about your Visit?   Team Line:  [phone]   Urgent Care - [place] and [facility] - 11am-9pm Monday-Friday Saturday-Sunday- 9am-5pm   [place] - 5pm-9pm Monday-Friday Saturday-Sunday- 9am-5pm  [phone] - [facility]  [phone] - [place]       What options do I have for visits at the clinic other than the traditional office visit?  To expand how we care for you, many of our providers are utilizing electronic visits (e-visits) and telephone visits, when medically appropriate, for interactions with their patients rather than a visit in the clinic.   We also offer nurse visits for many medical concerns. Just like any other service, we will bill your insurance company for this type of visit based on time spent on the phone with your provider. Not all insurance companies cover these visits. Please check with your medical insurance if this type of visit is covered. You will be responsible for any charges that are not paid by your insurance.      E-visits via 10Six:  generally incur a $35.00 fee.  Telephone visits:  Time spent on the phone: *charged based on time that is spent on the phone in increments of 10 minutes. Estimated cost:   5-10 mins $30.00   11-20 mins. $59.00   21-30 mins. $85.00     Use [employer] (secure email communication and access to your chart) to send your primary care provider a message or make an appointment. Ask someone on your Team how to sign up for 10Six.  For a Price Quote for your services, please call our Consumer Price Line at [phone].  As always, Thank you for trusting us with your health care needs!

## 2017-06-23 ASSESSMENT — PATIENT HEALTH QUESTIONNAIRE - PHQ9: SUM OF ALL RESPONSES TO PHQ QUESTIONS 1-9: 0

## 2017-09-26 NOTE — PROGRESS NOTES
SUBJECTIVE:   Juanita Lechuga is a 51 year old female who presents to clinic today for the following health issues:      Chief Complaint   Patient presents with     Recheck Medication     Metformin     Patient Request     would like to discuss about hot flashes     Health Maintenance     DUE: COLON CANCER SCREENING              Problem list and histories reviewed & adjusted, as indicated.  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     CARDIOVASCULAR SCREENING; LDL GOAL LESS THAN 130     Advanced directives, counseling/discussion     Situational mixed anxiety and depressive disorder     Adjustment disorder with mixed anxiety and depressed mood     Anxiety     Non morbid obesity due to excess calories     Past Surgical History:   Procedure Laterality Date     BUNIONECTOMY RT/LT  8/7/2008    (L) first toe     BUNIONECTOMY RT/LT  9/20/12    right first toe       Social History   Substance Use Topics     Smoking status: Former Smoker     Packs/day: 0.50     Years: 27.00     Types: Cigarettes     Quit date: 10/24/2011     Smokeless tobacco: Never Used     Alcohol use Yes      Comment: rare     Family History   Problem Relation Age of Onset     C.A.D. Father      Coronary Artery Disease Father      CANCER Maternal Grandmother          Current Outpatient Prescriptions   Medication Sig Dispense Refill     metFORMIN (GLUCOPHAGE) 500 MG tablet Take 1 tablet (500 mg) by mouth 2 times daily (with meals) 180 tablet 3     carvedilol (COREG CR) 20 MG 24 hr capsule Take 1 capsule (20 mg) by mouth daily 90 capsule 4     losartan-hydrochlorothiazide (HYZAAR) 100-25 MG per tablet Take 1 tablet by mouth daily 90 tablet 4     fluticasone (FLONASE) 50 MCG/ACT spray USE ONE TO TWO SPRAY(S) IN EACH NOSTRIL ONCE DAILY 1 Bottle 3     MULTI-VITAMIN OR TABS 1 TABLET DAILY       Allergies   Allergen Reactions     Lisinopril Cough     BP Readings from Last 3  "Encounters:   10/05/17 136/76   06/22/17 138/80   02/24/17 142/80    Wt Readings from Last 3 Encounters:   10/05/17 196 lb (88.9 kg)   06/22/17 197 lb (89.4 kg)   02/24/17 197 lb 9.6 oz (89.6 kg)                  Labs reviewed in EPIC        Reviewed and updated as needed this visit by clinical staff       Reviewed and updated as needed this visit by Provider         ROS:  This 51 year old female is here today to discuss her hot flashes. She hasn't had a menses in 8 months and is miserable. Sweat drips off her at work and she is very ashamed. Hard for her to do her job. She was in [place] this summer for a month and her cousin shared some estrogen cream with her and that seemed to help a little. She is very fatigued all the time. No motivation to go swimming anymore. Is upset that she can't lose weight. She feels very bloated in her stomach and hasn't had a colonoscopy yet. She stopped her citalopram about 6 months ago. Doesn't feel that she needs it anymore. All other review of systems are negative  Personal, family, and social history reviewed with patient and revised.         OBJECTIVE:     /76 (BP Location: Left arm, Patient Position: Chair, Cuff Size: Adult Large)  Pulse 88  Temp 97  F (36.1  C)  Ht 5' 2\" (1.575 m)  Wt 196 lb (88.9 kg)  SpO2 98%  BMI 35.85 kg/m2  Body mass index is 35.85 kg/(m^2).  GENERAL: healthy, alert and no distress, but the hair on her head is really thinning   NECK: no adenopathy, no asymmetry, masses, or scars and thyroid normal to palpation  RESP: lungs clear to auscultation - no rales, rhonchi or wheezes  CV: regular rate and rhythm, normal S1 S2, no S3 or S4, no murmur, click or rub, no peripheral edema and peripheral pulses strong  ABDOMEN: soft, nontender, no hepatosplenomegaly, no masses and bowel sounds normal, but large truncal obesity   MS: no gross musculoskeletal defects noted, no edema  Well hydrated  Well nourished  Well groomed  Alert and oriented X 3  Good " spirits  Brisk gait with no shortness of breath   Diagnostic Test Results:  none     ASSESSMENT/PLAN:              1. Menopausal flushing  As above, if she is in menopause, I will start her on low dose prempro  - Follicle stimulating hormone  - Lutropin    2. Anxiety  As above     3. Fatigue, unspecified type  As above   - TSH with free T4 reflex  - CBC with platelets differential    4. Screen for colon cancer  overdue  - GASTROENTEROLOGY ADULT REF PROCEDURE ONLY    Return to clinic if no improvement     LUCIANO KIM MD  [facility]    Results for orders placed or performed in visit on 10/05/17   TSH with free T4 reflex   Result Value Ref Range    TSH 2.90 0.40 - 4.00 mU/L   Follicle stimulating hormone   Result Value Ref Range    FSH 37.2 IU/L   Lutropin   Result Value Ref Range    Lutropin 21.4 IU/L   CBC with platelets differential   Result Value Ref Range    WBC 6.2 4.0 - 11.0 10e9/L    RBC Count 4.17 3.8 - 5.2 10e12/L    Hemoglobin 13.2 11.7 - 15.7 g/dL    Hematocrit 38.2 35.0 - 47.0 %    MCV 92 78 - 100 fl    MCH 31.7 26.5 - 33.0 pg    MCHC 34.6 31.5 - 36.5 g/dL    RDW 13.0 10.0 - 15.0 %    Platelet Count 256 150 - 450 10e9/L    Diff Method Automated Method     % Neutrophils 52.9 %    % Lymphocytes 28.2 %    % Monocytes 11.5 %    % Eosinophils 7.1 %    % Basophils 0.3 %    Absolute Neutrophil 3.3 1.6 - 8.3 10e9/L    Absolute Lymphocytes 1.8 0.8 - 5.3 10e9/L    Absolute Monocytes 0.7 0.0 - 1.3 10e9/L    Absolute Eosinophils 0.4 0.0 - 0.7 10e9/L    Absolute Basophils 0.0 0.0 - 0.2 10e9/L    patient will be started on prempro as I promised her as she has an elevated FSH which confirms she is in menopause.     LUCIANO KIM M.D.

## 2017-10-05 ENCOUNTER — OFFICE VISIT (OUTPATIENT)
Dept: FAMILY MEDICINE | Facility: CLINIC | Age: 52
End: 2017-10-05
Payer: COMMERCIAL

## 2017-10-05 VITALS
TEMPERATURE: 97 F | WEIGHT: 196 LBS | DIASTOLIC BLOOD PRESSURE: 76 MMHG | SYSTOLIC BLOOD PRESSURE: 136 MMHG | HEART RATE: 88 BPM | HEIGHT: 62 IN | OXYGEN SATURATION: 98 % | BODY MASS INDEX: 36.07 KG/M2

## 2017-10-05 DIAGNOSIS — R53.83 FATIGUE, UNSPECIFIED TYPE: ICD-10-CM

## 2017-10-05 DIAGNOSIS — N95.1 MENOPAUSAL FLUSHING: Primary | ICD-10-CM

## 2017-10-05 DIAGNOSIS — F41.9 ANXIETY: ICD-10-CM

## 2017-10-05 DIAGNOSIS — Z12.11 SCREEN FOR COLON CANCER: ICD-10-CM

## 2017-10-05 LAB
BASOPHILS # BLD AUTO: 0 10E9/L (ref 0–0.2)
BASOPHILS NFR BLD AUTO: 0.3 %
DIFFERENTIAL METHOD BLD: NORMAL
EOSINOPHIL # BLD AUTO: 0.4 10E9/L (ref 0–0.7)
EOSINOPHIL NFR BLD AUTO: 7.1 %
ERYTHROCYTE [DISTWIDTH] IN BLOOD BY AUTOMATED COUNT: 13 % (ref 10–15)
HCT VFR BLD AUTO: 38.2 % (ref 35–47)
HGB BLD-MCNC: 13.2 G/DL (ref 11.7–15.7)
LYMPHOCYTES # BLD AUTO: 1.8 10E9/L (ref 0.8–5.3)
LYMPHOCYTES NFR BLD AUTO: 28.2 %
MCH RBC QN AUTO: 31.7 PG (ref 26.5–33)
MCHC RBC AUTO-ENTMCNC: 34.6 G/DL (ref 31.5–36.5)
MCV RBC AUTO: 92 FL (ref 78–100)
MONOCYTES # BLD AUTO: 0.7 10E9/L (ref 0–1.3)
MONOCYTES NFR BLD AUTO: 11.5 %
NEUTROPHILS # BLD AUTO: 3.3 10E9/L (ref 1.6–8.3)
NEUTROPHILS NFR BLD AUTO: 52.9 %
PLATELET # BLD AUTO: 256 10E9/L (ref 150–450)
RBC # BLD AUTO: 4.17 10E12/L (ref 3.8–5.2)
WBC # BLD AUTO: 6.2 10E9/L (ref 4–11)

## 2017-10-05 PROCEDURE — 36415 COLL VENOUS BLD VENIPUNCTURE: CPT | Performed by: FAMILY MEDICINE

## 2017-10-05 PROCEDURE — 85025 COMPLETE CBC W/AUTO DIFF WBC: CPT | Performed by: FAMILY MEDICINE

## 2017-10-05 PROCEDURE — 99214 OFFICE O/P EST MOD 30 MIN: CPT | Performed by: FAMILY MEDICINE

## 2017-10-05 PROCEDURE — 83001 ASSAY OF GONADOTROPIN (FSH): CPT | Performed by: FAMILY MEDICINE

## 2017-10-05 PROCEDURE — 83002 ASSAY OF GONADOTROPIN (LH): CPT | Performed by: FAMILY MEDICINE

## 2017-10-05 PROCEDURE — 84443 ASSAY THYROID STIM HORMONE: CPT | Performed by: FAMILY MEDICINE

## 2017-10-05 NOTE — PATIENT INSTRUCTIONS
[facility] (formerly [facility]3]    If you have any questions regarding to your visit please contact your care team:       Team Purple:   Clinic Hours Telephone Number   Dr. Farida Drake     7am-7pm  Monday - Thursday   7am-5pm  Fridays  [phone]  (Appointment scheduling available 24/7)    Questions about your Visit?   Team Line:  [phone]   Urgent Care - [place] and [facility] - 11am-9pm Monday-Friday Saturday-Sunday- 9am-5pm   [place] - 5pm-9pm Monday-Friday Saturday-Sunday- 9am-5pm  [phone] - [facility]  [phone] - [place]       What options do I have for visits at the clinic other than the traditional office visit?  To expand how we care for you, many of our providers are utilizing electronic visits (e-visits) and telephone visits, when medically appropriate, for interactions with their patients rather than a visit in the clinic.   We also offer nurse visits for many medical concerns. Just like any other service, we will bill your insurance company for this type of visit based on time spent on the phone with your provider. Not all insurance companies cover these visits. Please check with your medical insurance if this type of visit is covered. You will be responsible for any charges that are not paid by your insurance.      E-visits via Anita Margarita:  generally incur a $35.00 fee.  Telephone visits:  Time spent on the phone: *charged based on time that is spent on the phone in increments of 10 minutes. Estimated cost:   5-10 mins $30.00   11-20 mins. $59.00   21-30 mins. $85.00     Use [employer] (secure email communication and access to your chart) to send your primary care provider a message or make an appointment. Ask someone on your Team how to sign up for Anita Margarita.  For a Price Quote for your services, please call our Consumer Price Line at [phone].  As always, Thank you for trusting us with your health care needs!

## 2017-10-05 NOTE — NURSING NOTE
"Chief Complaint   Patient presents with     Recheck Medication     Metformin     Patient Request     would like to discuss about hot flashes     Health Maintenance     DUE: COLON CANCER SCREENING       Initial /76 (BP Location: Left arm, Patient Position: Chair, Cuff Size: Adult Large)  Pulse 88  Temp 97  F (36.1  C)  Ht 5' 2\" (1.575 m)  Wt 196 lb (88.9 kg)  SpO2 98%  BMI 35.85 kg/m2 Estimated body mass index is 35.85 kg/(m^2) as calculated from the following:    Height as of this encounter: 5' 2\" (1.575 m).    Weight as of this encounter: 196 lb (88.9 kg).  Medication Reconciliation: complete   Marilu Zamudio MA      "

## 2017-10-05 NOTE — MR AVS SNAPSHOT
After Visit Summary   10/5/2017    Juanita Lechuga    MRN: [id number]           Patient Information     Date Of Birth          1965        Visit Information        Provider Department      10/5/2017 5:00 PM Farida Cardona MD [facility]        Today's Diagnoses     Menopausal flushing    -  1    Anxiety        Fatigue, unspecified type        Screen for colon cancer          Care Instructions    [facility]    If you have any questions regarding to your visit please contact your care team:       Team Purple:   Clinic Hours Telephone Number   Dr. Farida Drake     7am-7pm  Monday - Thursday   7am-5pm  Fridays  [phone]  (Appointment scheduling available 24/7)    Questions about your Visit?   Team Line:  [phone]   Urgent Care - [place] and [facility] - 11am-9pm Monday-Friday Saturday-Sunday- 9am-5pm   [place] - 5pm-9pm Monday-Friday Saturday-Sunday- 9am-5pm  [phone] - [facility]  [phone] - [place]       What options do I have for visits at the clinic other than the traditional office visit?  To expand how we care for you, many of our providers are utilizing electronic visits (e-visits) and telephone visits, when medically appropriate, for interactions with their patients rather than a visit in the clinic.   We also offer nurse visits for many medical concerns. Just like any other service, we will bill your insurance company for this type of visit based on time spent on the phone with your provider. Not all insurance companies cover these visits. Please check with your medical insurance if this type of visit is covered. You will be responsible for any charges that are not paid by your insurance.      E-visits via Coupay:  generally incur a $35.00 fee.  Telephone visits:  Time spent on the phone: *charged based on time that is spent on the phone in increments of 10 minutes. Estimated cost:    5-10 mins $30.00   11-20 mins. $59.00   21-30 mins. $85.00     Use Wesabehart (secure email communication and access to your chart) to send your primary care provider a message or make an appointment. Ask someone on your Team how to sign up for [email or web address].  For a Price Quote for your services, please call our [employer] Line at [phone].  As always, Thank you for trusting us with your health care needs!              Follow-ups after your visit        Additional Services     GASTROENTEROLOGY ADULT REF PROCEDURE ONLY       Last Lab Result: Creatinine (mg/dL)       Date                     Value                 02/24/2017               0.48 (L)         ----------  There is no height or weight on file to calculate BMI.     Needed:  No  Language:  English    Patient will be contacted to schedule procedure.     Please be aware that coverage of these services is subject to the terms and limitations of your health insurance plan.  Call member services at your health plan with any benefit or coverage questions.  Any procedures must be performed at a [facility] facility OR coordinated by your clinic's referral office.    Please bring the following with you to your appointment:    (1) Any X-Rays, CTs or MRIs which have been performed.  Contact the facility where they were done to arrange for  prior to your scheduled appointment.    (2) List of current medications   (3) This referral request   (4) Any documents/labs given to you for this referral                  Who to contact     If you have questions or need follow up information about today's clinic visit or your schedule please contact [facility] directly at [phone].  Normal or non-critical lab and imaging results will be communicated to you by MyChart, letter or phone within 4 business days after the clinic has received the results. If you do not hear from us within 7 days, please contact the clinic through MyChart or phone. If you  "have a critical or abnormal lab result, we will notify you by phone as soon as possible.  Submit refill requests through BeDo or call your pharmacy and they will forward the refill request to us. Please allow 3 business days for your refill to be completed.          Additional Information About Your Visit        AirPlughart Information     BeDo gives you secure access to your electronic health record. If you see a primary care provider, you can also send messages to your care team and make appointments. If you have questions, please call your primary care clinic.  If you do not have a primary care provider, please call [phone] and they will assist you.        Care EveryWhere ID     This is your Care EveryWhere ID. This could be used by other organizations to access your [facility] medical records  [id number]        Your Vitals Were     Pulse Temperature Height Pulse Oximetry BMI (Body Mass Index)       88 97  F (36.1  C) 5' 2\" (1.575 m) 98% 35.85 kg/m2        Blood Pressure from Last 3 Encounters:   10/05/17 136/76   06/22/17 138/80   02/24/17 142/80    Weight from Last 3 Encounters:   10/05/17 196 lb (88.9 kg)   06/22/17 197 lb (89.4 kg)   02/24/17 197 lb 9.6 oz (89.6 kg)              We Performed the Following     CBC with platelets differential     Follicle stimulating hormone     GASTROENTEROLOGY ADULT REF PROCEDURE ONLY     Lutropin     TSH with free T4 reflex        Primary Care Provider Office Phone # Fax #    Farida Cardona [phone]       [address] [address]        Equal Access to Services     [facility]: Hadii dajuan rubi hadasho Soomaali, waaxda luqadaha, qaybta kaalmada loree razo. So [facility] [phone].    ATENCIÓN: Si habla español, tiene a hudson disposición servicios gratuitos de asistencia lingüística. Morenaame al [phone].    We comply with applicable federal civil rights laws and " [place] laws. We do not discriminate on the basis of race, color, national origin, age, disability, sex, sexual orientation, or gender identity.            Thank you!     Thank you for choosing [facility]  for your care. Our goal is always to provide you with excellent care. Hearing back from our patients is one way we can continue to improve our services. Please take a few minutes to complete the written survey that you may receive in the mail after your visit with us. Thank you!             Your Updated Medication List - Protect others around you: Learn how to safely use, store and throw away your medicines at www.disposemymeds.org.          This list is accurate as of: 10/5/17  5:11 PM.  Always use your most recent med list.                   Brand Name Dispense Instructions for use Diagnosis    carvedilol 20 MG 24 hr capsule    COREG CR    90 capsule    Take 1 capsule (20 mg) by mouth daily    Benign essential hypertension       fluticasone 50 MCG/ACT spray    FLONASE    1 Bottle    USE ONE TO TWO SPRAY(S) IN EACH NOSTRIL ONCE DAILY    Seasonal allergic rhinitis, unspecified allergic rhinitis trigger       losartan-hydrochlorothiazide 100-25 MG per tablet    HYZAAR    90 tablet    Take 1 tablet by mouth daily    Benign essential hypertension       metFORMIN 500 MG tablet    GLUCOPHAGE    180 tablet    Take 1 tablet (500 mg) by mouth 2 times daily (with meals)    Metabolic syndrome X       Multi-vitamin Tabs tablet   Generic drug:  multivitamin, therapeutic with minerals      1 TABLET DAILY    Knee pain

## 2017-10-05 NOTE — LETTER
[address] [place] [address]    October 9, 2017    Juanita Lechuga  [address]  [place] [address]          Dear Juanita,    Your blood tests confirm that you are in menopause. Enclosed is a prescription for an estrogen/progesterone table for you to take one a day. Please let me know if it doesn't reduce your hot flashes. I wish you well.     Results for orders placed or performed in visit on 10/05/17   TSH with free T4 reflex   Result Value Ref Range    TSH 2.90 0.40 - 4.00 mU/L   Follicle stimulating hormone   Result Value Ref Range    FSH 37.2 IU/L   Lutropin   Result Value Ref Range    Lutropin 21.4 IU/L   CBC with platelets differential   Result Value Ref Range    WBC 6.2 4.0 - 11.0 10e9/L    RBC Count 4.17 3.8 - 5.2 10e12/L    Hemoglobin 13.2 11.7 - 15.7 g/dL    Hematocrit 38.2 35.0 - 47.0 %    MCV 92 78 - 100 fl    MCH 31.7 26.5 - 33.0 pg    MCHC 34.6 31.5 - 36.5 g/dL    RDW 13.0 10.0 - 15.0 %    Platelet Count 256 150 - 450 10e9/L    Diff Method Automated Method     % Neutrophils 52.9 %    % Lymphocytes 28.2 %    % Monocytes 11.5 %    % Eosinophils 7.1 %    % Basophils 0.3 %    Absolute Neutrophil 3.3 1.6 - 8.3 10e9/L    Absolute Lymphocytes 1.8 0.8 - 5.3 10e9/L    Absolute Monocytes 0.7 0.0 - 1.3 10e9/L    Absolute Eosinophils 0.4 0.0 - 0.7 10e9/L    Absolute Basophils 0.0 0.0 - 0.2 10e9/L       If you have any questions or concerns, please me or my clinic team at [phone].      Sincerely,        Farida Cardona MD/bt

## 2017-10-06 LAB
FSH SERPL-ACNC: 37.2 IU/L
LH SERPL-ACNC: 21.4 IU/L
TSH SERPL DL<=0.005 MIU/L-ACNC: 2.9 MU/L (ref 0.4–4)

## 2017-10-13 ENCOUNTER — TELEPHONE (OUTPATIENT)
Dept: FAMILY MEDICINE | Facility: CLINIC | Age: 52
End: 2017-10-13

## 2017-10-13 ENCOUNTER — TELEPHONE (OUTPATIENT)
Dept: OBGYN | Facility: CLINIC | Age: 52
End: 2017-10-13

## 2017-10-13 DIAGNOSIS — N95.1 MENOPAUSAL SYNDROME (HOT FLASHES): Primary | ICD-10-CM

## 2017-10-16 RX ORDER — MEDROXYPROGESTERONE ACETATE 2.5 MG/1
2.5 TABLET ORAL DAILY
Qty: 90 TABLET | Refills: 3 | Status: SHIPPED | OUTPATIENT
Start: 2017-10-16 | End: 2018-03-23

## 2017-10-16 RX ORDER — ESTRADIOL 0.5 MG/1
0.5 TABLET ORAL DAILY
Qty: 90 TABLET | Refills: 3 | Status: SHIPPED | OUTPATIENT
Start: 2017-10-16 | End: 2018-03-23

## 2017-10-16 NOTE — TELEPHONE ENCOUNTER
Received fax on 10/13/2017 from [facility] stating that patient is paying a high copay with estrogen, conjugated,-medroxyPROGESTERone (PREMPRO) 0.625-2.5 MG per tablet and pharmacy is wondering if 2 individual Rx  ( Estradiol or Medroxyprogersterone) would be an option to help lower the copay.    Please advise,    Ira Velásquez MA

## 2018-03-13 ENCOUNTER — RADIANT APPOINTMENT (OUTPATIENT)
Dept: MAMMOGRAPHY | Facility: CLINIC | Age: 53
End: 2018-03-13
Attending: FAMILY MEDICINE
Payer: COMMERCIAL

## 2018-03-13 DIAGNOSIS — Z12.31 VISIT FOR SCREENING MAMMOGRAM: ICD-10-CM

## 2018-03-13 PROCEDURE — 77067 SCR MAMMO BI INCL CAD: CPT | Mod: TC

## 2018-03-19 NOTE — PATIENT INSTRUCTIONS
Preventive Health Recommendations  Female Ages 50 - 64    Yearly exam: See your health care provider every year in order to  o Review health changes.   o Discuss preventive care.    o Review your medicines if your doctor has prescribed any.      Get a Pap test every three years (unless you have an abnormal result and your provider advises testing more often).    If you get Pap tests with HPV test, you only need to test every 5 years, unless you have an abnormal result.     You do not need a Pap test if your uterus was removed (hysterectomy) and you have not had cancer.    You should be tested each year for STDs (sexually transmitted diseases) if you're at risk.     Have a mammogram every 1 to 2 years.    Have a colonoscopy at age 50, or have a yearly FIT test (stool test). These exams screen for colon cancer.      Have a cholesterol test every 5 years, or more often if advised.    Have a diabetes test (fasting glucose) every three years. If you are at risk for diabetes, you should have this test more often.     If you are at risk for osteoporosis (brittle bone disease), think about having a bone density scan (DEXA).    Shots: Get a flu shot each year. Get a tetanus shot every 10 years.    Nutrition:     Eat at least 5 servings of fruits and vegetables each day.    Eat whole-grain bread, whole-wheat pasta and brown rice instead of white grains and rice.    Talk to your provider about Calcium and Vitamin D.     Lifestyle    Exercise at least 150 minutes a week (30 minutes a day, 5 days a week). This will help you control your weight and prevent disease.    Limit alcohol to one drink per day.    No smoking.     Wear sunscreen to prevent skin cancer.     See your dentist every six months for an exam and cleaning.    See your eye doctor every 1 to 2 years.    [facility]    If you have any questions regarding to your visit please contact your care team:       Team Purple:   Clinic Hours Telephone Number    Dr. Farida Birch   7am-7pm  Monday - Thursday   7am-5pm  Fridays  [phone]  (Appointment scheduling available 24/7)    Questions about your Visit?   Team Line:  [phone]   [facility] [place] [facility] 11am-9pm Monday-Friday Saturday-Sunday- 9am-5pm   [place] - 5pm-9pm Monday-Friday Saturday-Sunday- 9am-5pm  [phone] - [place]   [phone] - [place]       What options do I have for visits at the clinic other than the traditional office visit?  To expand how we care for you, many of our providers are utilizing electronic visits (e-visits) and telephone visits, when medically appropriate, for interactions with their patients rather than a visit in the clinic.   We also offer nurse visits for many medical concerns. Just like any other service, we will bill your insurance company for this type of visit based on time spent on the phone with your provider. Not all insurance companies cover these visits. Please check with your medical insurance if this type of visit is covered. You will be responsible for any charges that are not paid by your insurance.      E-visits via [employer]:  generally incur a $35.00 fee.  Telephone visits:  Time spent on the phone: *charged based on time that is spent on the phone in increments of 10 minutes. Estimated cost:   5-10 mins $30.00   11-20 mins. $59.00   21-30 mins. $85.00     Use ShowEvidencet (secure email communication and access to your chart) to send your primary care provider a message or make an appointment. Ask someone on your Team how to sign up for [employer].  For a Price Quote for your services, please call our Consumer Price Line at [phone].  As always, Thank you for trusting us with your health care needs!

## 2018-03-23 ENCOUNTER — OFFICE VISIT (OUTPATIENT)
Dept: FAMILY MEDICINE | Facility: CLINIC | Age: 53
End: 2018-03-23
Payer: COMMERCIAL

## 2018-03-23 VITALS
HEIGHT: 61 IN | WEIGHT: 192 LBS | DIASTOLIC BLOOD PRESSURE: 80 MMHG | SYSTOLIC BLOOD PRESSURE: 148 MMHG | RESPIRATION RATE: 18 BRPM | BODY MASS INDEX: 36.25 KG/M2 | HEART RATE: 79 BPM | TEMPERATURE: 97 F | OXYGEN SATURATION: 96 %

## 2018-03-23 DIAGNOSIS — I10 BENIGN ESSENTIAL HYPERTENSION: ICD-10-CM

## 2018-03-23 DIAGNOSIS — Z23 NEED FOR TDAP VACCINATION: ICD-10-CM

## 2018-03-23 DIAGNOSIS — N62 HYPERTROPHY OF BREAST: ICD-10-CM

## 2018-03-23 DIAGNOSIS — Z12.11 SCREEN FOR COLON CANCER: ICD-10-CM

## 2018-03-23 DIAGNOSIS — Z13.6 CARDIOVASCULAR SCREENING; LDL GOAL LESS THAN 130: ICD-10-CM

## 2018-03-23 DIAGNOSIS — E66.09 NON MORBID OBESITY DUE TO EXCESS CALORIES: ICD-10-CM

## 2018-03-23 DIAGNOSIS — Z00.00 ENCOUNTER FOR ROUTINE ADULT HEALTH EXAMINATION WITHOUT ABNORMAL FINDINGS: Primary | ICD-10-CM

## 2018-03-23 DIAGNOSIS — F43.23 SITUATIONAL MIXED ANXIETY AND DEPRESSIVE DISORDER: ICD-10-CM

## 2018-03-23 DIAGNOSIS — Z23 NEED FOR SHINGLES VACCINE: ICD-10-CM

## 2018-03-23 LAB
ALBUMIN SERPL-MCNC: 3.6 G/DL (ref 3.4–5)
ALP SERPL-CCNC: 53 U/L (ref 40–150)
ALT SERPL W P-5'-P-CCNC: 30 U/L (ref 0–50)
ANION GAP SERPL CALCULATED.3IONS-SCNC: 6 MMOL/L (ref 3–14)
AST SERPL W P-5'-P-CCNC: 26 U/L (ref 0–45)
BILIRUB SERPL-MCNC: 0.4 MG/DL (ref 0.2–1.3)
BUN SERPL-MCNC: 18 MG/DL (ref 7–30)
CALCIUM SERPL-MCNC: 9 MG/DL (ref 8.5–10.1)
CHLORIDE SERPL-SCNC: 104 MMOL/L (ref 94–109)
CHOLEST SERPL-MCNC: 198 MG/DL
CO2 SERPL-SCNC: 29 MMOL/L (ref 20–32)
CREAT SERPL-MCNC: 0.45 MG/DL (ref 0.52–1.04)
GFR SERPL CREATININE-BSD FRML MDRD: >90 ML/MIN/1.7M2
GLUCOSE SERPL-MCNC: 89 MG/DL (ref 70–99)
HDLC SERPL-MCNC: 62 MG/DL
LDLC SERPL CALC-MCNC: 124 MG/DL
NONHDLC SERPL-MCNC: 136 MG/DL
POTASSIUM SERPL-SCNC: 4 MMOL/L (ref 3.4–5.3)
PROT SERPL-MCNC: 7.9 G/DL (ref 6.8–8.8)
SODIUM SERPL-SCNC: 139 MMOL/L (ref 133–144)
TRIGL SERPL-MCNC: 61 MG/DL

## 2018-03-23 PROCEDURE — 99396 PREV VISIT EST AGE 40-64: CPT | Performed by: FAMILY MEDICINE

## 2018-03-23 PROCEDURE — 80053 COMPREHEN METABOLIC PANEL: CPT | Performed by: FAMILY MEDICINE

## 2018-03-23 PROCEDURE — 80061 LIPID PANEL: CPT | Performed by: FAMILY MEDICINE

## 2018-03-23 PROCEDURE — 90750 HZV VACC RECOMBINANT IM: CPT | Performed by: FAMILY MEDICINE

## 2018-03-23 PROCEDURE — 90715 TDAP VACCINE 7 YRS/> IM: CPT | Performed by: FAMILY MEDICINE

## 2018-03-23 PROCEDURE — 36415 COLL VENOUS BLD VENIPUNCTURE: CPT | Performed by: FAMILY MEDICINE

## 2018-03-23 RX ORDER — CARVEDILOL PHOSPHATE 20 MG/1
CAPSULE, EXTENDED RELEASE ORAL
Qty: 90 CAPSULE | Refills: 4 | Status: SHIPPED | OUTPATIENT
Start: 2018-03-23 | End: 2018-07-20

## 2018-03-23 RX ORDER — LOSARTAN POTASSIUM AND HYDROCHLOROTHIAZIDE 25; 100 MG/1; MG/1
1 TABLET ORAL DAILY
Qty: 90 TABLET | Refills: 4 | Status: SHIPPED | OUTPATIENT
Start: 2018-03-23 | End: 2019-01-10

## 2018-03-23 ASSESSMENT — PAIN SCALES - GENERAL: PAINLEVEL: NO PAIN (0)

## 2018-03-23 NOTE — PROGRESS NOTES
SUBJECTIVE:   CC: Juanita Lechuga is an 52 year old woman who presents for preventive health visit.     Physical   Annual:     Getting at least 3 servings of Calcium per day::  Yes    Bi-annual eye exam::  Yes    Dental care twice a year::  Yes    Sleep apnea or symptoms of sleep apnea::  None    Diet::  Low salt, Low fat/cholesterol and Carbohydrate counting    Frequency of exercise::  2-3 days/week    Duration of exercise::  45-60 minutes    Taking medications regularly::  Yes    Medication side effects::  None    Additional concerns today::  No                ENT Symptoms             Symptoms: cc Present Absent Comment   Fever/Chills   x    Fatigue   x    Muscle Aches   x    Eye Irritation  x     Sneezing  x     Nasal Lucien/Drg  x     Sinus Pressure/Pain  x  slight   Loss of smell   x    Dental pain   x    Sore Throat   x    Swollen Glands   x    Ear Pain/Fullness  x     Cough  x     Wheeze   x    Chest Pain   x    Shortness of breath   x    Rash       Other         Symptom duration:  Symptoms started 5 days ago.  Are improving   Symptom severity:  mild   Treatments tried:  Ibuprofen OTC Cold meds   Contacts:           Today's PHQ-2 Score:   PHQ-2 ( 1999 Pfizer) 3/23/2018   Q1: Little interest or pleasure in doing things 0   Q2: Feeling down, depressed or hopeless 0   PHQ-2 Score 0   Q1: Little interest or pleasure in doing things Not at all   Q2: Feeling down, depressed or hopeless Not at all   PHQ-2 Score 0       Abuse: Current or Past(Physical, Sexual or Emotional)- No  Do you feel safe in your environment - Yes    Social History   Substance Use Topics     Smoking status: Former Smoker     Packs/day: 0.50     Years: 27.00     Types: Cigarettes     Quit date: 10/24/2011     Smokeless tobacco: Never Used     Alcohol use Yes      Comment: rare     Alcohol Use 3/23/2018   If you drink alcohol do you typically have greater than 3 drinks per day OR greater than 7 drinks per week? No   No flowsheet data  found.    Reviewed orders with patient.  Reviewed health maintenance and updated orders accordingly - Yes  Labs reviewed in EPIC  BP Readings from Last 3 Encounters:   03/23/18 148/80   10/05/17 136/76   06/22/17 138/80    Wt Readings from Last 3 Encounters:   03/23/18 192 lb (87.1 kg)   10/05/17 196 lb (88.9 kg)   06/22/17 197 lb (89.4 kg)                  Patient Active Problem List   Diagnosis     Old disruption of anterior cruciate ligament     CARDIOVASCULAR SCREENING; LDL GOAL LESS THAN 130     Advanced directives, counseling/discussion     Situational mixed anxiety and depressive disorder     Adjustment disorder with mixed anxiety and depressed mood     Anxiety     Non morbid obesity due to excess calories     Hypertrophy of breast     Past Surgical History:   Procedure Laterality Date     BUNIONECTOMY RT/LT  8/7/2008    (L) first toe     BUNIONECTOMY RT/LT  9/20/12    right first toe       Social History   Substance Use Topics     Smoking status: Former Smoker     Packs/day: 0.50     Years: 27.00     Types: Cigarettes     Quit date: 10/24/2011     Smokeless tobacco: Never Used     Alcohol use Yes      Comment: rare     Family History   Problem Relation Age of Onset     C.A.D. Father      Coronary Artery Disease Father      CANCER Maternal Grandmother          Current Outpatient Prescriptions   Medication Sig Dispense Refill     losartan-hydrochlorothiazide (HYZAAR) 100-25 MG per tablet Take 1 tablet by mouth daily 90 tablet 4     COREG CR 20 MG 24 hr capsule TAKE ONE CAPSULE BY MOUTH ONCE DAILY 30 capsule 5     fluticasone (FLONASE) 50 MCG/ACT spray USE ONE TO TWO SPRAY(S) IN EACH NOSTRIL ONCE DAILY 1 Bottle 3     MULTI-VITAMIN OR TABS 1 TABLET DAILY       [DISCONTINUED] losartan-hydrochlorothiazide (HYZAAR) 100-25 MG per tablet Take 1 tablet by mouth daily 90 tablet 4     Allergies   Allergen Reactions     Lisinopril Cough       Patient over age 50, mutual decision to screen reflected in health  "maintenance.    Pertinent mammograms are reviewed under the imaging tab.  History of abnormal Pap smear: NO - age 30-65 PAP every 5 years with negative HPV co-testing recommended    Reviewed and updated as needed this visit by clinical staff  Tobacco  Allergies  Meds  Problems  Med Hx  Surg Hx  Fam Hx  Soc Hx          Reviewed and updated as needed this visit by Provider  Allergies  Meds  Problems          This 52 year old female is here today for annual female exam. She is  and has one son. Her family is in [place] and she visits them twice a year. She is trying hard to lose weight. Is back at the [employer], swimming. She gets anxious when she comes here. Her home blood pressures are always under 140. She is overdue for colonoscopy. She has been giving it more thought to have a breast reduction surgery. It would be easier to swim, her exercise of choice. She is getting more neck and upper back pains. She stopped estrogen and provera and is doing well with no hot flashes.  All other review of systems are negative  Personal, family, and social history reviewed with patient and revised.    Review of Systems  C: NEGATIVE for fever, chills, change in weight  I: NEGATIVE for worrisome rashes, moles or lesions  E: NEGATIVE for vision changes or irritation  ENT: NEGATIVE for ear, mouth and throat problems  R: NEGATIVE for significant cough or SOB  B: NEGATIVE for masses, tenderness or discharge  CV: NEGATIVE for chest pain, palpitations or peripheral edema  GI: NEGATIVE for nausea, abdominal pain, heartburn, or change in bowel habits  : NEGATIVE for unusual urinary or vaginal symptoms. No vaginal bleeding.  M: NEGATIVE for significant arthralgias or myalgia  N: NEGATIVE for weakness, dizziness or paresthesias  P: NEGATIVE for changes in mood or affect      OBJECTIVE:   /80  Pulse 79  Temp 97  F (36.1  C) (Oral)  Resp 18  Ht 5' 1\" (1.549 m)  Wt 192 lb (87.1 kg)  SpO2 96%  BMI 36.28 kg/m2  Physical " Exam  GENERAL APPEARANCE: healthy, alert and no distress  EYES: Eyes grossly normal to inspection, PERRL and conjunctivae and sclerae normal  HENT: ear canals and TM's normal, nose and mouth without ulcers or lesions, oropharynx clear and oral mucous membranes moist  NECK: no adenopathy, no asymmetry, masses, or scars and thyroid normal to palpation  RESP: lungs clear to auscultation - no rales, rhonchi or wheezes  BREAST: normal without masses, tenderness or nipple discharge and no palpable axillary masses or adenopathy, but her breasts are very large and heavy and she has deep grooves in her upper shoulders   CV: regular rate and rhythm, normal S1 S2, no S3 or S4, no murmur, click or rub, no peripheral edema and peripheral pulses strong  ABDOMEN: soft, nontender, no hepatosplenomegaly, no masses and bowel sounds normal  MS: no musculoskeletal defects are noted and gait is age appropriate without ataxia  SKIN: no suspicious lesions or rashes  NEURO: Normal strength and tone, sensory exam grossly normal, mentation intact and speech normal  PSYCH: mentation appears normal and affect normal/bright    ASSESSMENT/PLAN:   1. Encounter for routine adult health examination without abnormal findings  Healthy but overweight.     2. Benign essential hypertension  Fair control. She needs to lose more weight   - losartan-hydrochlorothiazide (HYZAAR) 100-25 MG per tablet; Take 1 tablet by mouth daily  Dispense: 90 tablet; Refill: 4  - Comprehensive metabolic panel    3. Non morbid obesity due to excess calories  At risk for diabetes   - Comprehensive metabolic panel    4. Situational mixed anxiety and depressive disorder  As above     5. CARDIOVASCULAR SCREENING; LDL GOAL LESS THAN 130  due  - Lipid panel reflex to direct LDL Fasting  - Comprehensive metabolic panel    6. Hypertrophy of breast  As above. She will try to get a free consultation from a plastic surgeon     7. Screen for colon cancer  due  - GASTROENTEROLOGY ADULT  "REF PROCEDURE ONLY [facility] [phone]; [facility]    8. Need for Tdap vaccination  due  - TDAP VACCINE (ADACEL)    9. Need for shingles vaccine  due  - ZOSTER VACCINE RECOMBINANT ADJUVANTED IM NJX (SHINGRIX)    COUNSELING:  Reviewed preventive health counseling, as reflected in patient instructions       Regular exercise       Healthy diet/nutrition         reports that she quit smoking about 6 years ago. Her smoking use included Cigarettes. She has a 13.50 pack-year smoking history. She has never used smokeless tobacco.    Estimated body mass index is 36.28 kg/(m^2) as calculated from the following:    Height as of this encounter: 5' 1\" (1.549 m).    Weight as of this encounter: 192 lb (87.1 kg).   Weight management plan: Discussed healthy diet and exercise guidelines and patient will follow up in 12 months in clinic to re-evaluate.    Counseling Resources:  ATP IV Guidelines  Pooled Cohorts Equation Calculator  Breast Cancer Risk Calculator  FRAX Risk Assessment  ICSI Preventive Guidelines  Dietary Guidelines for Americans, 2010  USDA's MyPlate  ASA Prophylaxis  Lung CA Screening    LUCIANO KIM MD  [facility]  "

## 2018-03-23 NOTE — NURSING NOTE
Screening Questionnaire for Adult Immunization    Are you sick today?   No   Do you have allergies to medications, food, a vaccine component or latex?   No   Have you ever had a serious reaction after receiving a vaccination?   No   Do you have a long-term health problem with heart disease, lung disease, asthma, kidney disease, metabolic disease (e.g. diabetes), anemia, or other blood disorder?   No   Do you have cancer, leukemia, HIV/AIDS, or any other immune system problem?   No   In the past 3 months, have you taken medications that affect  your immune system, such as prednisone, other steroids, or anticancer drugs; drugs for the treatment of rheumatoid arthritis, Crohn s disease, or psoriasis; or have you had radiation treatments?   No   Have you had a seizure, or a brain or other nervous system problem?   No   During the past year, have you received a transfusion of blood or blood     products, or been given immune (gamma) globulin or antiviral drug?   No   For women: Are you pregnant or is there a chance you could become        pregnant during the next month?   No   Have you received any vaccinations in the past 4 weeks?   No     Immunization questionnaire answers were all negative.        Per orders of Dr. Thompson, injection of Tdap and Shingrix given by Gosia Dsouza. Patient instructed to remain in clinic for 15 minutes afterwards, and to report any adverse reaction to me immediately.       Screening performed by Gosia Dsouza on 3/23/2018 at 8:56 AM.

## 2018-03-23 NOTE — MR AVS SNAPSHOT
After Visit Summary   3/23/2018    Juanita Lechuga    MRN: [id number]           Patient Information     Date Of Birth          1965        Visit Information        Provider Department      3/23/2018 8:00 AM Farida Cardona MD [facility]        Today's Diagnoses     Encounter for routine adult health examination without abnormal findings    -  1    Benign essential hypertension        CARDIOVASCULAR SCREENING; LDL GOAL LESS THAN 130        Non morbid obesity due to excess calories        Situational mixed anxiety and depressive disorder        Screen for colon cancer        Need for Tdap vaccination        Need for shingles vaccine          Care Instructions      Preventive Health Recommendations  Female Ages 50 - 64    Yearly exam: See your health care provider every year in order to  o Review health changes.   o Discuss preventive care.    o Review your medicines if your doctor has prescribed any.      Get a Pap test every three years (unless you have an abnormal result and your provider advises testing more often).    If you get Pap tests with HPV test, you only need to test every 5 years, unless you have an abnormal result.     You do not need a Pap test if your uterus was removed (hysterectomy) and you have not had cancer.    You should be tested each year for STDs (sexually transmitted diseases) if you're at risk.     Have a mammogram every 1 to 2 years.    Have a colonoscopy at age 50, or have a yearly FIT test (stool test). These exams screen for colon cancer.      Have a cholesterol test every 5 years, or more often if advised.    Have a diabetes test (fasting glucose) every three years. If you are at risk for diabetes, you should have this test more often.     If you are at risk for osteoporosis (brittle bone disease), think about having a bone density scan (DEXA).    Shots: Get a flu shot each year. Get a tetanus shot every 10 years.    Nutrition:     Eat at least 5  servings of fruits and vegetables each day.    Eat whole-grain bread, whole-wheat pasta and brown rice instead of white grains and rice.    Talk to your provider about Calcium and Vitamin D.     Lifestyle    Exercise at least 150 minutes a week (30 minutes a day, 5 days a week). This will help you control your weight and prevent disease.    Limit alcohol to one drink per day.    No smoking.     Wear sunscreen to prevent skin cancer.     See your dentist every six months for an exam and cleaning.    See your eye doctor every 1 to 2 years.    [facility]    If you have any questions regarding to your visit please contact your care team:       Team Purple:   Clinic Hours Telephone Number   Dr. Farida Birch   7am-7pm  Monday - Thursday   7am-5pm  Fridays  [phone]  (Appointment scheduling available 24/7)    Questions about your Visit?   Team Line:  [phone]   Urgent Care - [place] and [place] - 11am-9pm Monday-Friday Saturday-Sunday- 9am-5pm   [place] - 5pm-9pm Monday-Friday Saturday-Sunday- 9am-5pm  [phone] - [place]   [phone] - [place]       What options do I have for visits at the clinic other than the traditional office visit?  To expand how we care for you, many of our providers are utilizing electronic visits (e-visits) and telephone visits, when medically appropriate, for interactions with their patients rather than a visit in the clinic.   We also offer nurse visits for many medical concerns. Just like any other service, we will bill your insurance company for this type of visit based on time spent on the phone with your provider. Not all insurance companies cover these visits. Please check with your medical insurance if this type of visit is covered. You will be responsible for any charges that are not paid by your insurance.      E-visits via [employer]  generally incur a $35.00 fee.  Telephone  visits:  Time spent on the phone: *charged based on time that is spent on the phone in increments of 10 minutes. Estimated cost:   5-10 mins $30.00   11-20 mins. $59.00   21-30 mins. $85.00     Use [employer] (secure email communication and access to your chart) to send your primary care provider a message or make an appointment. Ask someone on your Team how to sign up for [email or web address].  For a Price Quote for your services, please call our [employer] Price Line at [phone].  As always, Thank you for trusting us with your health care needs!                Follow-ups after your visit        Additional Services     GASTROENTEROLOGY ADULT REF PROCEDURE ONLY [facility] [phone]; [facility]       Last Lab Result: Creatinine (mg/dL)       Date                     Value                 02/24/2017               0.48 (L)         ----------  There is no height or weight on file to calculate BMI.     Needed:  No  Language:  English    Patient will be contacted to schedule procedure.     Please be aware that coverage of these services is subject to the terms and limitations of your health insurance plan.  Call member services at your health plan with any benefit or coverage questions.  Any procedures must be performed at a [facility] facility OR coordinated by your clinic's referral office.    Please bring the following with you to your appointment:    (1) Any X-Rays, CTs or MRIs which have been performed.  Contact the facility where they were done to arrange for  prior to your scheduled appointment.    (2) List of current medications   (3) This referral request   (4) Any documents/labs given to you for this referral                  Who to contact     If you have questions or need follow up information about today's clinic visit or your schedule please contact [facility] directly at [phone].  Normal or non-critical lab and imaging results will be communicated to you by  "MyChart, letter or phone within 4 business days after the clinic has received the results. If you do not hear from us within 7 days, please contact the clinic through [employer] or phone. If you have a critical or abnormal lab result, we will notify you by phone as soon as possible.  Submit refill requests through [employer] or call your pharmacy and they will forward the refill request to us. Please allow 3 business days for your refill to be completed.          Additional Information About Your Visit        [employer] Information     [employer] gives you secure access to your electronic health record. If you see a primary care provider, you can also send messages to your care team and make appointments. If you have questions, please call your primary care clinic.  If you do not have a primary care provider, please call [phone] and they will assist you.        Care EveryWhere ID     This is your Care EveryWhere ID. This could be used by other organizations to access your [facility] medical records  [id number]        Your Vitals Were     Pulse Temperature Respirations Height Pulse Oximetry BMI (Body Mass Index)    79 97  F (36.1  C) (Oral) 18 5' 1\" (1.549 m) 96% 36.28 kg/m2       Blood Pressure from Last 3 Encounters:   03/23/18 148/80   10/05/17 136/76   06/22/17 138/80    Weight from Last 3 Encounters:   03/23/18 192 lb (87.1 kg)   10/05/17 196 lb (88.9 kg)   06/22/17 197 lb (89.4 kg)              We Performed the Following     Comprehensive metabolic panel     GASTROENTEROLOGY ADULT REF PROCEDURE ONLY [facility] [phone]; [facility]     Lipid panel reflex to direct LDL Fasting     TDAP VACCINE (ADACEL)     ZOSTER VACCINE RECOMBINANT ADJUVANTED IM NJX (SHINGRIX)          Where to get your medicines      These medications were sent to [facility] [address] [place], [place] - [address]  [address] [facility] [address]     Phone:  [phone]     " losartan-hydrochlorothiazide 100-25 MG per tablet          Primary Care Provider Office Phone # Fax #    Farida Cardona [phone]       [address] [address]        Equal Access to Services     JESSICA CASTELLANO : Hadii dajuan rubi hadasho Soomaali, waaxda luqadaha, qaybta kaalmada adeegyada, waxesau walsh hayellis suarezkalanimarichuy strong. So [facility] [phone].    ATENCIÓN: Si habla español, tiene a hudson disposición servicios gratuitos de asistencia lingüística. Wiliam al [phone].    We comply with applicable federal civil rights laws and [place] laws. We do not discriminate on the basis of race, color, national origin, age, disability, sex, sexual orientation, or gender identity.            Thank you!     Thank you for choosing [facility]  for your care. Our goal is always to provide you with excellent care. Hearing back from our patients is one way we can continue to improve our services. Please take a few minutes to complete the written survey that you may receive in the mail after your visit with us. Thank you!             Your Updated Medication List - Protect others around you: Learn how to safely use, store and throw away your medicines at www.disposemymeds.org.          This list is accurate as of 3/23/18  8:26 AM.  Always use your most recent med list.                   Brand Name Dispense Instructions for use Diagnosis    COREG CR 20 MG 24 hr capsule   Generic drug:  carvedilol     30 capsule    TAKE ONE CAPSULE BY MOUTH ONCE DAILY    Benign essential hypertension       fluticasone 50 MCG/ACT spray    FLONASE    1 Bottle    USE ONE TO TWO SPRAY(S) IN EACH NOSTRIL ONCE DAILY    Seasonal allergic rhinitis, unspecified allergic rhinitis trigger       losartan-hydrochlorothiazide 100-25 MG per tablet    HYZAAR    90 tablet    Take 1 tablet by mouth daily    Benign essential hypertension       Multi-vitamin Tabs tablet   Generic drug:   multivitamin, therapeutic with minerals      1 TABLET DAILY    Knee pain

## 2018-03-23 NOTE — LETTER
[facility]  [address] [place] [address]    March 26, 2018    Juanita Lechuga  [address]  [place] [address]          Dear Juanita,    All of your lab tests are looking good. Continue your healthy lifestyle    Enclosed is a copy of your results.     Results for orders placed or performed in visit on 03/23/18   Lipid panel reflex to direct LDL Fasting   Result Value Ref Range    Cholesterol 198 <200 mg/dL    Triglycerides 61 <150 mg/dL    HDL Cholesterol 62 >49 mg/dL    LDL Cholesterol Calculated 124 (H) <100 mg/dL    Non HDL Cholesterol 136 (H) <130 mg/dL   Comprehensive metabolic panel   Result Value Ref Range    Sodium 139 133 - 144 mmol/L    Potassium 4.0 3.4 - 5.3 mmol/L    Chloride 104 94 - 109 mmol/L    Carbon Dioxide 29 20 - 32 mmol/L    Anion Gap 6 3 - 14 mmol/L    Glucose 89 70 - 99 mg/dL    Urea Nitrogen 18 7 - 30 mg/dL    Creatinine 0.45 (L) 0.52 - 1.04 mg/dL    GFR Estimate >90 >60 mL/min/1.7m2    GFR Estimate If Black >90 >60 mL/min/1.7m2    Calcium 9.0 8.5 - 10.1 mg/dL    Bilirubin Total 0.4 0.2 - 1.3 mg/dL    Albumin 3.6 3.4 - 5.0 g/dL    Protein Total 7.9 6.8 - 8.8 g/dL    Alkaline Phosphatase 53 40 - 150 U/L    ALT 30 0 - 50 U/L    AST 26 0 - 45 U/L       If you have any questions or concerns, please call myself or my nurse at [phone].      Sincerely,        Farida Cardona MD/nathalia

## 2018-03-24 ASSESSMENT — PATIENT HEALTH QUESTIONNAIRE - PHQ9: SUM OF ALL RESPONSES TO PHQ QUESTIONS 1-9: 1

## 2018-04-24 ENCOUNTER — TELEPHONE (OUTPATIENT)
Dept: FAMILY MEDICINE | Facility: CLINIC | Age: 53
End: 2018-04-24

## 2018-04-24 NOTE — TELEPHONE ENCOUNTER
Panel Management Review      Patient has the following on her problem list:     Hypertension   Last three blood pressure readings:  BP Readings from Last 3 Encounters:   03/23/18 148/80   10/05/17 136/76   06/22/17 138/80     Blood pressure: FAILED    HTN Guidelines:  Age 18-59 BP range:  Less than 140/90  Age 60-85 with Diabetes:  Less than 140/90  Age 60-85 without Diabetes:  less than 150/90      Composite cancer screening  Chart review shows that this patient is due/due soon for the following Colonoscopy  Summary:    Patient is due/failing the following:   BP CHECK and COLONOSCOPY    Action needed:   Patient needs office visit for BP Check.    Type of outreach:    Sent letter.    Questions for provider review:    None                                                                                                                                    Ira Velásquez MA       Chart routed to none .

## 2018-04-24 NOTE — LETTER
April 24, 2018          Juanita Lechuga,  [address]  [place] [address]        Dear Juanita Lechuga      Monitoring and managing your preventative and chronic health conditions are very important to us. Our records indicate that you have not scheduled for Blood Pressure Check and  Colonoscopy  which was recommended by Dr. Cardona      If you have received your health care elsewhere, please call the clinic so the information can be documented in your chart.    Please call [phone] or message us through your [employer] account to schedule an appointment or provide information for your chart.     Feel free to contact us if you have any questions or concerns!    I look forward to seeing you and working with you on your health care needs.     Sincerely,         Farida Cardona P / AV

## 2018-07-13 ENCOUNTER — TRANSFERRED RECORDS (OUTPATIENT)
Dept: HEALTH INFORMATION MANAGEMENT | Facility: CLINIC | Age: 53
End: 2018-07-13

## 2018-07-13 ENCOUNTER — OFFICE VISIT (OUTPATIENT)
Dept: URGENT CARE | Facility: URGENT CARE | Age: 53
End: 2018-07-13
Payer: COMMERCIAL

## 2018-07-13 VITALS
HEART RATE: 101 BPM | SYSTOLIC BLOOD PRESSURE: 154 MMHG | DIASTOLIC BLOOD PRESSURE: 84 MMHG | TEMPERATURE: 97.9 F | BODY MASS INDEX: 36.62 KG/M2 | OXYGEN SATURATION: 95 % | WEIGHT: 193.8 LBS

## 2018-07-13 DIAGNOSIS — H57.9 EYE PRESSURE: Primary | ICD-10-CM

## 2018-07-13 PROCEDURE — 99214 OFFICE O/P EST MOD 30 MIN: CPT | Performed by: NURSE PRACTITIONER

## 2018-07-13 NOTE — MR AVS SNAPSHOT
After Visit Summary   7/13/2018    Juanita Lechuga    MRN: [id number]           Patient Information     Date Of Birth          1965        Visit Information        Provider Department      7/13/2018 5:15 PM Naa Winkler NP [facility]        Today's Diagnoses     Eye pressure    -  1      Care Instructions    Referred to ER for work-up. Patient verbalized she is going to the ER.          Follow-ups after your visit        Your next 10 appointments already scheduled     Jul 23, 2018  2:00 PM CDT   [facility]    [address] [address] [phone]           Bring a current list of meds and any records pertaining to this visit. For Physicals, please bring immunization records and any forms needing to be filled out. Please arrive 10 minutes early to complete paperwork.              Who to contact     If you have questions or need follow up information about today's clinic visit or your schedule please contact [facility] directly at [phone].  Normal or non-critical lab and imaging results will be communicated to you by [employer], letter or phone within 4 business days after the clinic has received the results. If you do not hear from us within 7 days, please contact the clinic through [employer] or phone. If you have a critical or abnormal lab result, we will notify you by phone as soon as possible.  Submit refill requests through [employer] or call your pharmacy and they will forward the refill request to us. Please allow 3 business days for your refill to be completed.          Additional Information About Your Visit        MyChart Information     [employer] gives you secure access to your electronic health record. If you see a primary care provider, you can also send messages to your care team and make appointments. If you have questions, please call your primary  [facility] clinic.  If you do not have a primary care provider, please call [phone] and they will assist you.        Care EveryWhere ID     This is your Care EveryWhere ID. This could be used by other organizations to access your [facility] medical records  [id number]        Your Vitals Were     Pulse Temperature Pulse Oximetry BMI (Body Mass Index)          101 97.9  F (36.6  C) (Oral) 95% 36.62 kg/m2         Blood Pressure from Last 3 Encounters:   07/13/18 154/84   03/23/18 148/80   10/05/17 136/76    Weight from Last 3 Encounters:   07/13/18 193 lb 12.8 oz (87.9 kg)   03/23/18 192 lb (87.1 kg)   10/05/17 196 lb (88.9 kg)              Today, you had the following     No orders found for display       Primary Care Provider Office Phone # Fax #    Farida Cardona [phone]       [address] [address]        Equal Access to Services     OLI CASTELLANO : Hadii aad ku hadasho Soomaali, waaxda luqadaha, qaybta kaalmada adeegyada, loree monet . So [facility] [phone].    ATENCIÓN: Si habla español, tiene a hudson disposición servicios gratuitos de asistencia lingüística. Llame al [phone].    We comply with applicable federal civil rights laws and [place] laws. We do not discriminate on the basis of race, color, national origin, age, disability, sex, sexual orientation, or gender identity.            Thank you!     Thank you for choosing [facility]  for your care. Our goal is always to provide you with excellent care. Hearing back from our patients is one way we can continue to improve our services. Please take a few minutes to complete the written survey that you may receive in the mail after your visit with us. Thank you!             Your Updated Medication List - Protect others around you: Learn how to safely use, store and throw away your medicines at www.disposemymeds.org.          This list is accurate as of 7/13/18  9:40 PM.   Always use your most recent med list.                   Brand Name Dispense Instructions for use Diagnosis    carvedilol 20 MG 24 hr capsule    COREG CR    90 capsule    TAKE ONE CAPSULE BY MOUTH ONCE DAILY    Benign essential hypertension       fluticasone 50 MCG/ACT spray    FLONASE    16 g    INHALE ONE TO TWO SPRAY(S) IN EACH NOSTRIL ONCE DAILY    Seasonal allergic rhinitis       losartan-hydrochlorothiazide 100-25 MG per tablet    HYZAAR    90 tablet    Take 1 tablet by mouth daily    Benign essential hypertension       Multi-vitamin Tabs tablet   Generic drug:  multivitamin, therapeutic with minerals      1 TABLET DAILY    Knee pain

## 2018-07-13 NOTE — PROGRESS NOTES
SUBJECTIVE:  Chief Complaint:   Chief Complaint   Patient presents with     Eye Problem     Pressure behind her eyes, eyes are sensitive to light, sinus pressure/pain x 2 weeks      History of Present Illness:  Juanita Lechuga is a 52 year old female who presents complaining of moderate both eyes pressure behind eyes for 2 week(s). History of hypertension treated with medications. Home BP running 130's/75. Legs feel a little weak at times. Drinking fluids throughout the day. Saw eye doctor at 11 am today, no findings with eyes. History of anxiety and depression.  Onset/timing: unchanged.    Associated Signs and Symptoms: very mild cough in the am, goes away. Eyes sensitive to bright light. Eyes feel heavy above eyes. No nasal drainage. No drainage down back of throat. Ringing in ears which she has had in the past. Feels a little light headed at times, none right now. No nausea. No headache.   Treatment measures tried include: seen by eye doctor today, eye exam negative for any findings  Contact wearer : wearing glasses    Past Medical History:   Diagnosis Date     ACL tear 12/09    left, improved with physical therapy     COPD (chronic obstructive pulmonary disease) (H) 3/29/2012    resolved after she quit smoking     HTN (hypertension)      Current Outpatient Prescriptions   Medication Sig Dispense Refill     carvedilol (COREG CR) 20 MG 24 hr capsule TAKE ONE CAPSULE BY MOUTH ONCE DAILY 90 capsule 4     fluticasone (FLONASE) 50 MCG/ACT spray INHALE ONE TO TWO SPRAY(S) IN EACH NOSTRIL ONCE DAILY 16 g 3     losartan-hydrochlorothiazide (HYZAAR) 100-25 MG per tablet Take 1 tablet by mouth daily 90 tablet 4     MULTI-VITAMIN OR TABS 1 TABLET DAILY          ROS:  Negative except as noted above.    OBJECTIVE:  /84  Pulse 101  Temp 97.9  F (36.6  C) (Oral)  Wt 193 lb 12.8 oz (87.9 kg)  SpO2 95%  BMI 36.62 kg/m2  General: no acute distress  Eye exam: bilateral eye normal lid, conjunctiva, cornea, and pupil and  fundus.  Ears: normal canals, TMs bilaterally, normal TM mobility  Nose: NORMAL - no drainage, turbinates normal in size.  Neck: supple, non-tender, free range of motion, no adenopathy  Heart: NORMAL - regular rate and rhythm without murmur.  Lungs: normal and clear to auscultation  Neuro exam: EOM's normal, equal strength bilateral forearms, shoulders, quadricepts, foot flexion and extention. Coordination normal finger to thumb, heel to shin, finger to nose. Tandam gait, and walking on heels normal.     ASSESSMENT:  Eye Pressure    PLAN: Referred to ER. Patient verbalized willingness to go.       See orders in epic

## 2018-07-19 NOTE — PROGRESS NOTES
SUBJECTIVE:   Juanita Lechuga is a 52 year old female who presents to clinic today for the following health issues:      ED/UC Followup:    Facility:  [facility]  Date of visit: 07/13/2018  Reason for visit: Sinusitis  Current Status: states she's stilling feeling plugged up     Patient presents for follow up of sinusitis ongoing for weeks to months. Presenting symptom was pressure behind the eyes with associated tinnitus, dizziness. She had an eye exam one week ago, which was normal, then presented to Urgent Care and was advised to seek care in the Emergency Room to rule out brain mass. Head CT in the Emergency Room confirmed sinusitis. Patient has not had any improvement since starting Augmentin.    Also has a history of hypertension and BPs have been elevated.    Problem list and histories reviewed & adjusted, as indicated.  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     CARDIOVASCULAR SCREENING; LDL GOAL LESS THAN 130     Advanced directives, counseling/discussion     Situational mixed anxiety and depressive disorder     Adjustment disorder with mixed anxiety and depressed mood     Anxiety     Non morbid obesity due to excess calories     Hypertrophy of breast     Past Surgical History:   Procedure Laterality Date     BUNIONECTOMY RT/LT  8/7/2008    (L) first toe     BUNIONECTOMY RT/LT  9/20/12    right first toe       Social History   Substance Use Topics     Smoking status: Former Smoker     Packs/day: 0.50     Years: 27.00     Types: Cigarettes     Quit date: 10/24/2011     Smokeless tobacco: Never Used     Alcohol use Yes      Comment: rare     Family History   Problem Relation Age of Onset     C.A.D. Father      Coronary Artery Disease Father      Cancer Maternal Grandmother            Reviewed and updated as needed this visit by clinical staff       Reviewed and updated as needed this visit by Provider      "    ROS:  Constitutional, HEENT, cardiovascular, pulmonary, gi systems are negative, except as otherwise noted.    OBJECTIVE:     /90 (BP Location: Right arm, Patient Position: Chair, Cuff Size: Adult Large)  Pulse 86  Temp 97.2  F (36.2  C) (Oral)  Ht 5' 1\" (1.549 m)  Wt 195 lb (88.5 kg)  SpO2 100%  BMI 36.84 kg/m2  Body mass index is 36.84 kg/(m^2).  GENERAL: healthy, alert and no distress  EYES: Eyes grossly normal to inspection, PERRL and conjunctivae and sclerae normal  HENT: ear canals and TM's normal, nose and mouth without ulcers or lesions  NECK: no adenopathy, no asymmetry, masses, or scars and thyroid normal to palpation  RESP: lungs clear to auscultation - no rales, rhonchi or wheezes  CV: regular rate and rhythm, normal S1 S2, no S3 or S4, no murmur, click or rub, no peripheral edema and peripheral pulses strong    Diagnostic Test Results:  none     ASSESSMENT/PLAN:     1. Sinusitis, unspecified chronicity, unspecified location  Not improving on Augmentin. Will change to Doxy and add steroid. Warned regarding photosensitivity on Doxy.    - doxycycline Monohydrate 100 MG TABS; Take 100 mg by mouth 2 times daily for 14 days  Dispense: 28 tablet; Refill: 0  - methylPREDNISolone (MEDROL DOSEPAK) 4 MG tablet; Follow package instructions  Dispense: 21 tablet; Refill: 0    2. Benign essential hypertension  Uncontrolled. Elevated BPs may be contributing to symptoms. Increase Coreg dose.  - carvedilol (COREG CR) 40 MG 24 hr capsule; TAKE ONE CAPSULE BY MOUTH ONCE DAILY  Dispense: 90 capsule; Refill: 0    3. Special screening for malignant neoplasms, colon    - Fecal colorectal cancer screen FIT; Future    Follow up 2 weeks with PCP    MORE Peck CNP  [facility]    "

## 2018-07-20 ENCOUNTER — OFFICE VISIT (OUTPATIENT)
Dept: FAMILY MEDICINE | Facility: CLINIC | Age: 53
End: 2018-07-20
Payer: COMMERCIAL

## 2018-07-20 VITALS
BODY MASS INDEX: 36.82 KG/M2 | HEART RATE: 86 BPM | HEIGHT: 61 IN | SYSTOLIC BLOOD PRESSURE: 160 MMHG | OXYGEN SATURATION: 100 % | TEMPERATURE: 97.2 F | DIASTOLIC BLOOD PRESSURE: 90 MMHG | WEIGHT: 195 LBS

## 2018-07-20 DIAGNOSIS — J32.9 SINUSITIS, UNSPECIFIED CHRONICITY, UNSPECIFIED LOCATION: Primary | ICD-10-CM

## 2018-07-20 DIAGNOSIS — I10 BENIGN ESSENTIAL HYPERTENSION: ICD-10-CM

## 2018-07-20 DIAGNOSIS — Z12.11 SPECIAL SCREENING FOR MALIGNANT NEOPLASMS, COLON: ICD-10-CM

## 2018-07-20 PROCEDURE — 99214 OFFICE O/P EST MOD 30 MIN: CPT | Performed by: NURSE PRACTITIONER

## 2018-07-20 RX ORDER — CARVEDILOL PHOSPHATE 40 MG/1
CAPSULE, EXTENDED RELEASE ORAL
Qty: 90 CAPSULE | Refills: 0 | Status: SHIPPED | OUTPATIENT
Start: 2018-07-20 | End: 2018-08-06

## 2018-07-20 RX ORDER — METHYLPREDNISOLONE 4 MG
TABLET, DOSE PACK ORAL
Qty: 21 TABLET | Refills: 0 | Status: SHIPPED | OUTPATIENT
Start: 2018-07-20 | End: 2018-07-27

## 2018-07-20 RX ORDER — DOXYCYCLINE 100 MG/1
100 TABLET ORAL 2 TIMES DAILY
Qty: 28 TABLET | Refills: 0 | Status: SHIPPED | OUTPATIENT
Start: 2018-07-20 | End: 2018-08-03

## 2018-07-20 ASSESSMENT — PAIN SCALES - GENERAL: PAINLEVEL: MODERATE PAIN (4)

## 2018-07-20 NOTE — PATIENT INSTRUCTIONS
[facility]    If you have any questions regarding to your visit please contact your care team:       Team Red:   Clinic Hours Telephone Number   Dr. Asmita Monaco, NP   7am-7pm  Monday - Thursday   7am-5pm  Fridays  [phone]  (Appointment scheduling available 24/7)    Questions about your recent visit?   Team Line  [phone]   Urgent Care - [place] and [facility] - 11am-9pm Monday-Friday Saturday-Sunday- 9am-5pm   [place] - 5pm-9pm Monday-Friday Saturday-Sunday- 9am-5pm  [phone] - [place]  [phone] - [place]       What options do I have for a visit other than an office visit? We offer electronic visits (e-visits) and telephone visits, when medically appropriate.  Please check with your medical insurance to see if these types of visits are covered, as you will be responsible for any charges that are not paid by your insurance.      You can use [employer] (secure electronic communication) to access to your chart, send your primary care provider a message, or make an appointment. Ask a team member how to get started.     For a price quote for your services, please call our Consumer Price Line at [phone] or our Imaging Cost estimation line at [phone] (for imaging tests).        Discharged by Tawny Marroquin MA.

## 2018-07-20 NOTE — MR AVS SNAPSHOT
After Visit Summary   7/20/2018    Juanita Lechuga    MRN: [id number]           Patient Information     Date Of Birth          1965        Visit Information        Provider Department      7/20/2018 8:20 AM Lorin Monaco APRN [facility]        Today's Diagnoses     Acute sinusitis with symptoms > 10 days    -  1    Benign essential hypertension        Special screening for malignant neoplasms, colon          Care Instructions    [facility]    If you have any questions regarding to your visit please contact your care team:       Team Red:   Clinic Hours Telephone Number   Dr. Asmita Monaco, NP   7am-7pm  Monday - Thursday   7am-5pm  Fridays  [phone]  (Appointment scheduling available 24/7)    Questions about your recent visit?   Team Line  [phone]   Urgent Care - [place] and [facility] - 11am-9pm Monday-Friday Saturday-Sunday- 9am-5pm   [place] - 5pm-9pm Monday-Friday Saturday-Sunday- 9am-5pm  [phone] - [place]  [phone] - [place]       What options do I have for a visit other than an office visit? We offer electronic visits (e-visits) and telephone visits, when medically appropriate.  Please check with your medical insurance to see if these types of visits are covered, as you will be responsible for any charges that are not paid by your insurance.      You can use [employer] (secure electronic communication) to access to your chart, send your primary care provider a message, or make an appointment. Ask a team member how to get started.     For a price quote for your services, please call our Consumer Price Line at [phone] or our Imaging Cost estimation line at [phone] (for imaging tests).        Discharged by Tawny Marroquin MA.            Follow-ups after your visit        Your next 10 appointments already scheduled     Jul 23, 2018  2:00 PM CDT   Office  Visit with Farida Cardona MD   [facility] ([facility])    [address]  [place] [address] [phone]           Bring a current list of meds and any records pertaining to this visit. For Physicals, please bring immunization records and any forms needing to be filled out. Please arrive 10 minutes early to complete paperwork.              Future tests that were ordered for you today     Open Future Orders        Priority Expected Expires Ordered    Fecal colorectal cancer screen FIT Routine 8/10/2018 7/20/2019 7/20/2018            Who to contact     If you have questions or need follow up information about today's clinic visit or your schedule please contact [facility] directly at [phone].  Normal or non-critical lab and imaging results will be communicated to you by MyChart, letter or phone within 4 business days after the clinic has received the results. If you do not hear from us within 7 days, please contact the clinic through MyChart or phone. If you have a critical or abnormal lab result, we will notify you by phone as soon as possible.  Submit refill requests through Varolii or call your pharmacy and they will forward the refill request to us. Please allow 3 business days for your refill to be completed.          Additional Information About Your Visit        MyChart Information     [employer] gives you secure access to your electronic health record. If you see a primary care provider, you can also send messages to your care team and make appointments. If you have questions, please call your primary care clinic.  If you do not have a primary care provider, please call [phone] and they will assist you.        Care EveryWhere ID     This is your Care EveryWhere ID. This could be used by other organizations to access your [place] medical records  [id number]        Your Vitals Were     Pulse Temperature Height Pulse Oximetry BMI (Body Mass  "Index)       86 97.2  F (36.2  C) (Oral) 5' 1\" (1.549 m) 100% 36.84 kg/m2        Blood Pressure from Last 3 Encounters:   07/20/18 (!) 178/94   07/13/18 154/84   03/23/18 148/80    Weight from Last 3 Encounters:   07/20/18 195 lb (88.5 kg)   07/13/18 193 lb 12.8 oz (87.9 kg)   03/23/18 192 lb (87.1 kg)                 Today's Medication Changes          These changes are accurate as of 7/20/18  8:56 AM.  If you have any questions, ask your nurse or doctor.               Start taking these medicines.        Dose/Directions    doxycycline Monohydrate 100 MG Tabs   Used for:  Acute sinusitis with symptoms > 10 days   Started by:  Lorin Monaco APRN CNP        Dose:  100 mg   Take 100 mg by mouth 2 times daily for 14 days   Quantity:  28 tablet   Refills:  0       methylPREDNISolone 4 MG tablet   Commonly known as:  MEDROL DOSEPAK   Used for:  Acute sinusitis with symptoms > 10 days   Started by:  Lorin Monaco APRN CNP        Follow package instructions   Quantity:  21 tablet   Refills:  0         These medicines have changed or have updated prescriptions.        Dose/Directions    carvedilol 40 MG 24 hr capsule   Commonly known as:  COREG CR   This may have changed:  medication strength   Used for:  Benign essential hypertension   Changed by:  Lorin Monaco APRN CNP        TAKE ONE CAPSULE BY MOUTH ONCE DAILY   Quantity:  90 capsule   Refills:  0         Stop taking these medicines if you haven't already. Please contact your care team if you have questions.     amoxicillin-clavulanate 875-125 MG per tablet   Commonly known as:  AUGMENTIN   Stopped by:  Lorin Monaco APRN CNP                Where to get your medicines      These medications were sent to [facility] [address] [place], [place] - [address] [address] [facility]  [address] [address]     Phone:  [phone]     carvedilol 40 MG 24 hr capsule    doxycycline Monohydrate 100 MG Tabs    methylPREDNISolone " 4 MG tablet                Primary Care Provider Office Phone # Fax #    Farida Cardona [phone] [address] [address]        Equal Access to Services     JESSICA CASTELLANO : Hadkarla dajuan rubi isa Somelvin, waaxda luqadaha, qaybta kaalmada adecarmen, loree estrada laAbielellis strong. So [facility] [phone].    ATENCIÓN: Si habla español, tiene a hudson disposición servicios gratuitos de asistencia lingüística. Llame al [phone].    We comply with applicable federal civil rights laws and [place] laws. We do not discriminate on the basis of race, color, national origin, age, disability, sex, sexual orientation, or gender identity.            Thank you!     Thank you for choosing [facility]  for your care. Our goal is always to provide you with excellent care. Hearing back from our patients is one way we can continue to improve our services. Please take a few minutes to complete the written survey that you may receive in the mail after your visit with us. Thank you!             Your Updated Medication List - Protect others around you: Learn how to safely use, store and throw away your medicines at www.disposemymeds.org.          This list is accurate as of 7/20/18  8:56 AM.  Always use your most recent med list.                   Brand Name Dispense Instructions for use Diagnosis    carvedilol 40 MG 24 hr capsule    COREG CR    90 capsule    TAKE ONE CAPSULE BY MOUTH ONCE DAILY    Benign essential hypertension       doxycycline Monohydrate 100 MG Tabs     28 tablet    Take 100 mg by mouth 2 times daily for 14 days    Acute sinusitis with symptoms > 10 days       fluticasone 50 MCG/ACT spray    FLONASE    16 g    INHALE ONE TO TWO SPRAY(S) IN EACH NOSTRIL ONCE DAILY    Seasonal allergic rhinitis       losartan-hydrochlorothiazide 100-25 MG per tablet    HYZAAR    90 tablet    Take 1 tablet by mouth daily    Benign essential hypertension        methylPREDNISolone 4 MG tablet    MEDROL DOSEPAK    21 tablet    Follow package instructions    Acute sinusitis with symptoms > 10 days       Multi-vitamin Tabs tablet   Generic drug:  multivitamin, therapeutic with minerals      1 TABLET DAILY    Knee pain

## 2018-07-23 NOTE — PROGRESS NOTES
SUBJECTIVE:   Juanita Lechuga is a 52 year old female who presents to clinic today for the following health issues:      Hypertension Follow-up      Outpatient blood pressures are being checked at home.  Results are 160/90.    Low Salt Diet: low salt      Amount of exercise or physical activity: Not lately with the sinus infection    Problems taking medications regularly: No    Medication side effects: none    Diet: low salt              Problem list and histories reviewed & adjusted, as indicated.  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     CARDIOVASCULAR SCREENING; LDL GOAL LESS THAN 130     Advanced directives, counseling/discussion     Situational mixed anxiety and depressive disorder     Adjustment disorder with mixed anxiety and depressed mood     Anxiety     Non morbid obesity due to excess calories     Hypertrophy of breast     Past Surgical History:   Procedure Laterality Date     BUNIONECTOMY RT/LT  8/7/2008    (L) first toe     BUNIONECTOMY RT/LT  9/20/12    right first toe       Social History   Substance Use Topics     Smoking status: Former Smoker     Packs/day: 0.50     Years: 27.00     Types: Cigarettes     Quit date: 10/24/2011     Smokeless tobacco: Never Used     Alcohol use Yes      Comment: rare     Family History   Problem Relation Age of Onset     C.A.D. Father      Coronary Artery Disease Father      Cancer Maternal Grandmother          Current Outpatient Prescriptions   Medication Sig Dispense Refill     carvedilol (COREG CR) 40 MG 24 hr capsule TAKE ONE CAPSULE BY MOUTH ONCE DAILY 90 capsule 0     doxycycline Monohydrate 100 MG TABS Take 100 mg by mouth 2 times daily for 14 days 28 tablet 0     fluticasone (FLONASE) 50 MCG/ACT spray INHALE ONE TO TWO SPRAY(S) IN EACH NOSTRIL ONCE DAILY 16 g 3     losartan-hydrochlorothiazide (HYZAAR) 100-25 MG per tablet Take 1 tablet by mouth daily 90 tablet 4      "methylPREDNISolone (MEDROL DOSEPAK) 4 MG tablet Follow package instructions 21 tablet 0     MULTI-VITAMIN OR TABS 1 TABLET DAILY       Allergies   Allergen Reactions     Lisinopril Cough     BP Readings from Last 3 Encounters:   07/24/18 142/72   07/20/18 160/90   07/13/18 154/84    Wt Readings from Last 3 Encounters:   07/24/18 192 lb 6.4 oz (87.3 kg)   07/20/18 195 lb (88.5 kg)   07/13/18 193 lb 12.8 oz (87.9 kg)                  Labs reviewed in EPIC    Reviewed and updated as needed this visit by clinical staff       Reviewed and updated as needed this visit by Provider         ROS:  This 52 year old female is here today because she is very worried about her high blood pressure and that just makes her more anxious, which makes her blood pressure go higher. She has had pressure and pain behind her eyes and was seen in urgent care where she was diagnosed with sinusitis and given augmentin. She was told that a simple exam in the office could not rule out a tumor behind her eye and that freaked her out so she went to the ER where cat scan of her head just showed inflammation of her sinuses. Her pain persisted and she came to see Lorin Monaco here who increased her coreg to 40 mg daily and changed her to doxycycline and prednisone taper. She still feels a lot of pressure behind her eyes and in her ears. She is flying to [place] for her annual month long visit with family next month. She really wants to feel better. No nausea. No fevers. She is on her lunch break from work for this visit. Hasn't missed any work due to her symptoms.  All other review of systems are negative  Personal, family, and social history reviewed with patient and revised.         OBJECTIVE:     /72  Pulse 85  Temp 98.3  F (36.8  C) (Oral)  Resp 20  Ht 5' 1\" (1.549 m)  Wt 192 lb 6.4 oz (87.3 kg)  SpO2 95%  BMI 36.35 kg/m2  Body mass index is 36.35 kg/(m^2).  GENERAL: healthy, alert with rapid anxious speech   EYES: Eyes grossly " normal to inspection, PERRL and conjunctivae and sclerae normal  HENT: ear canals and TM's normal, nose and mouth without ulcers or lesions  NECK: no adenopathy, no asymmetry, masses, or scars and thyroid normal to palpation  RESP: lungs clear to auscultation - no rales, rhonchi or wheezes  CV: regular rate and rhythm, normal S1 S2, no S3 or S4, no murmur, click or rub, no peripheral edema and peripheral pulses strong  MS: no gross musculoskeletal defects noted, no edema    Diagnostic Test Results:  none     ASSESSMENT/PLAN:              1. Acute recurrent maxillary sinusitis  As above   - OTOLARYNGOLOGY REFERRAL    2. Anxiety  Reassured her.       Return to clinic 2 weeks to recheck blood pressure.     LUCIANO KIM MD  [facility]

## 2018-07-24 ENCOUNTER — OFFICE VISIT (OUTPATIENT)
Dept: FAMILY MEDICINE | Facility: CLINIC | Age: 53
End: 2018-07-24
Payer: COMMERCIAL

## 2018-07-24 VITALS
OXYGEN SATURATION: 95 % | DIASTOLIC BLOOD PRESSURE: 72 MMHG | HEART RATE: 85 BPM | BODY MASS INDEX: 36.32 KG/M2 | TEMPERATURE: 98.3 F | RESPIRATION RATE: 20 BRPM | WEIGHT: 192.4 LBS | HEIGHT: 61 IN | SYSTOLIC BLOOD PRESSURE: 142 MMHG

## 2018-07-24 DIAGNOSIS — J01.01 ACUTE RECURRENT MAXILLARY SINUSITIS: Primary | ICD-10-CM

## 2018-07-24 DIAGNOSIS — F41.9 ANXIETY: ICD-10-CM

## 2018-07-24 PROCEDURE — 99213 OFFICE O/P EST LOW 20 MIN: CPT | Performed by: FAMILY MEDICINE

## 2018-07-24 ASSESSMENT — ANXIETY QUESTIONNAIRES
6. BECOMING EASILY ANNOYED OR IRRITABLE: SEVERAL DAYS
2. NOT BEING ABLE TO STOP OR CONTROL WORRYING: NEARLY EVERY DAY
1. FEELING NERVOUS, ANXIOUS, OR ON EDGE: NEARLY EVERY DAY
3. WORRYING TOO MUCH ABOUT DIFFERENT THINGS: NEARLY EVERY DAY
5. BEING SO RESTLESS THAT IT IS HARD TO SIT STILL: SEVERAL DAYS
7. FEELING AFRAID AS IF SOMETHING AWFUL MIGHT HAPPEN: NEARLY EVERY DAY
GAD7 TOTAL SCORE: 17

## 2018-07-24 ASSESSMENT — PATIENT HEALTH QUESTIONNAIRE - PHQ9: 5. POOR APPETITE OR OVEREATING: NEARLY EVERY DAY

## 2018-07-24 NOTE — PATIENT INSTRUCTIONS
[facility]    If you have any questions regarding to your visit please contact your care team:       Team Purple:   Clinic Hours Telephone Number   Dr. Farida Birch   7am-7pm  Monday - Thursday   7am-5pm  Fridays  [phone]  (Appointment scheduling available 24/7)    Questions about your recent visit?   Team Line:  [phone]   Urgent Care - [place] and [facility] - 11am-9pm Monday-Friday Saturday-Sunday- 9am-5pm   [place] - 5pm-9pm Monday-Friday Saturday-Sunday- 9am-5pm  [phone] - [place]  [phone] - [place]       What options do I have for a visit other than an office visit? We offer electronic visits (e-visits) and telephone visits, when medically appropriate.  Please check with your medical insurance to see if these types of visits are covered, as you will be responsible for any charges that are not paid by your insurance.      You can use [employer] (secure electronic communication) to access to your chart, send your primary care provider a message, or make an appointment. Ask a team member how to get started.     For a price quote for your services, please call our Consumer Price Line at [phone] or our Imaging Cost estimation line at [phone] (for imaging tests)

## 2018-07-24 NOTE — MR AVS SNAPSHOT
After Visit Summary   7/24/2018    Juanita Lechuga    MRN: [id number]           Patient Information     Date Of Birth          1965        Visit Information        Provider Department      7/24/2018 10:00 AM Farida Cardona MD [facility]        Today's Diagnoses     Acute recurrent maxillary sinusitis    -  1      Care Instructions    [facility]    If you have any questions regarding to your visit please contact your care team:       Team Purple:   Clinic Hours Telephone Number   Dr. Farida Birch   7am-7pm  Monday - Thursday   7am-5pm  Fridays  [phone]  (Appointment scheduling available 24/7)    Questions about your recent visit?   Team Line:  [phone]   Urgent Care - [place] and [facility] - 11am-9pm Monday-Friday Saturday-Sunday- 9am-5pm   [place] - 5pm-9pm Monday-Friday Saturday-Sunday- 9am-5pm  [phone] - [place]  [phone] [facility]       What options do I have for a visit other than an office visit? We offer electronic visits (e-visits) and telephone visits, when medically appropriate.  Please check with your medical insurance to see if these types of visits are covered, as you will be responsible for any charges that are not paid by your insurance.      You can use [employer] (secure electronic communication) to access to your chart, send your primary care provider a message, or make an appointment. Ask a team member how to get started.     For a price quote for your services, please call our Consumer Price Line at [phone] or our Imaging Cost estimation line at [phone] (for imaging tests)          Follow-ups after your visit        Additional Services     OTOLARYNGOLOGY REFERRAL       Your provider has referred you to: [facility]: [facility] [phone]   [email or web address]    Please be aware that coverage of these services  is subject to the terms and limitations of your health insurance plan.  Call member services at your health plan with any benefit or coverage questions.      Please bring the following with you to your appointment:    (1) Any X-Rays, CTs or MRIs which have been performed.  Contact the facility where they were done to arrange for  prior to your scheduled appointment.   (2) List of current medications  (3) This referral request   (4) Any documents/labs given to you for this referral                  Your next 10 appointments already scheduled     Aug 09, 2018  5:30 PM CDT   Office Visit with Farida Cardona MD   [facility] ([facility]    [address] [id number] [phone]           Bring a current list of meds and any records pertaining to this visit. For Physicals, please bring immunization records and any forms needing to be filled out. Please arrive 10 minutes early to complete paperwork.              Who to contact     If you have questions or need follow up information about today's clinic visit or your schedule please contact [facility] directly at [phone].  Normal or non-critical lab and imaging results will be communicated to you by MyChart, letter or phone within 4 business days after the clinic has received the results. If you do not hear from us within 7 days, please contact the clinic through Setem Technologieshart or phone. If you have a critical or abnormal lab result, we will notify you by phone as soon as possible.  Submit refill requests through Sh[employer] or call your pharmacy and they will forward the refill request to us. Please allow 3 business days for your refill to be completed.          Additional Information About Your Visit        [employer] Information     [employer] gives you secure access to your electronic health record. If you see a primary care provider, you can also send messages to your care team and make appointments. If  "you have questions, please call your primary care clinic.  If you do not have a primary care provider, please call [phone] and they will assist you.        Care EveryWhere ID     This is your Care EveryWhere ID. This could be used by other organizations to access your [place] medical records  [id number]        Your Vitals Were     Pulse Temperature Respirations Height Pulse Oximetry BMI (Body Mass Index)    85 98.3  F (36.8  C) (Oral) 20 5' 1\" (1.549 m) 95% 36.35 kg/m2       Blood Pressure from Last 3 Encounters:   07/24/18 142/72   07/20/18 160/90   07/13/18 154/84    Weight from Last 3 Encounters:   07/24/18 192 lb 6.4 oz (87.3 kg)   07/20/18 195 lb (88.5 kg)   07/13/18 193 lb 12.8 oz (87.9 kg)              We Performed the Following     OTOLARYNGOLOGY REFERRAL        Primary Care Provider Office Phone # Fax #    Farida Cardona [phone]       [address] [address]        Equal Access to Services     [facility]: Hadii aad ku hadasho Soomaali, waaxda luqadaha, qaybta kaalmada adeegyada, loree monet . So [facility] [phone].    ATENCIÓN: Si habla español, tiene a hudson disposición servicios gratuitos de asistencia lingüística. Ll[facility] [phone].    We comply with applicable federal civil rights laws and [place] laws. We do not discriminate on the basis of race, color, national origin, age, disability, sex, sexual orientation, or gender identity.            Thank you!     Thank you for choosing [facility]  for your care. Our goal is always to provide you with excellent care. Hearing back from our patients is one way we can continue to improve our services. Please take a few minutes to complete the written survey that you may receive in the mail after your visit with us. Thank you!             Your Updated Medication List - Protect others around you: Learn how to safely use, store and throw away your medicines at " www.disposemymeds.org.          This list is accurate as of 7/24/18 10:39 AM.  Always use your most recent med list.                   Brand Name Dispense Instructions for use Diagnosis    carvedilol 40 MG 24 hr capsule    COREG CR    90 capsule    TAKE ONE CAPSULE BY MOUTH ONCE DAILY    Benign essential hypertension       doxycycline Monohydrate 100 MG Tabs     28 tablet    Take 100 mg by mouth 2 times daily for 14 days    Sinusitis, unspecified chronicity, unspecified location       fluticasone 50 MCG/ACT spray    FLONASE    16 g    INHALE ONE TO TWO SPRAY(S) IN EACH NOSTRIL ONCE DAILY    Seasonal allergic rhinitis       losartan-hydrochlorothiazide 100-25 MG per tablet    HYZAAR    90 tablet    Take 1 tablet by mouth daily    Benign essential hypertension       methylPREDNISolone 4 MG tablet    MEDROL DOSEPAK    21 tablet    Follow package instructions    Sinusitis, unspecified chronicity, unspecified location       Multi-vitamin Tabs tablet   Generic drug:  multivitamin, therapeutic with minerals      1 TABLET DAILY    Knee pain

## 2018-07-24 NOTE — LETTER
My Depression Action Plan  Name: Juanita Lechuga   Date of Birth 1965  Date: 7/24/2018    My doctor: Farida Cardona   My clinic: [address]  [place] [address]  [phone]          GREEN    ZONE   Good Control    What it looks like:     Things are going generally well. You have normal up s and down s. You may even feel depressed from time to time, but bad moods usually last less than a day.   What you need to do:  1. Continue to care for yourself (see self care plan)  2. Check your depression survival kit and update it as needed  3. Follow your physician s recommendations including any medication.  4. Do not stop taking medication unless you consult with your physician first.           YELLOW         ZONE Getting Worse    What it looks like:     Depression is starting to interfere with your life.     It may be hard to get out of bed; you may be starting to isolate yourself from others.    Symptoms of depression are starting to last most all day and this has happened for several days.     You may have suicidal thoughts but they are not constant.   What you need to do:     1. Call your care team, your response to treatment will improve if you keep your care team informed of your progress. Yellow periods are signs an adjustment may need to be made.     2. Continue your self-care, even if you have to fake it!    3. Talk to someone in your support network    4. Open up your depression survival kit           RED    ZONE Medical Alert - Get Help    What it looks like:     Depression is seriously interfering with your life.     You may experience these or other symptoms: You can t get out of bed most days, can t work or engage in other necessary activities, you have trouble taking care of basic hygiene, or basic responsibilities, thoughts of suicide or death that will not go away, self-injurious behavior.     What you need to do:  1. Call your care team and  Culture Follow-up request a same-day appointment. If they are not available (weekends or after hours) call your local crisis line, emergency room or 911.            Depression Self Care Plan / Survival Kit    Self-Care for Depression  Here s the deal. Your body and mind are really not as separate as most people think.  What you do and think affects how you feel and how you feel influences what you do and think. This means if you do things that people who feel good do, it will help you feel better.  Sometimes this is all it takes.  There is also a place for medication and therapy depending on how severe your depression is, so be sure to consult with your medical provider and/ or Behavioral Health Consultant if your symptoms are worsening or not improving.     In order to better manage my stress, I will:    Exercise  Get some form of exercise, every day. This will help reduce pain and release endorphins, the  feel good  chemicals in your brain. This is almost as good as taking antidepressants!  This is not the same as joining a gym and then never going! (they count on that by the way ) It can be as simple as just going for a walk or doing some gardening, anything that will get you moving.      Hygiene   Maintain good hygiene (Get out of bed in the morning, Make your bed, Brush your teeth, Take a shower, and Get dressed like you were going to work, even if you are unemployed).  If your clothes don't fit try to get ones that do.    Diet  I will strive to eat foods that are good for me, drink plenty of water, and avoid excessive sugar, caffeine, alcohol, and other mood-altering substances.  Some foods that are helpful in depression are: complex carbohydrates, B vitamins, flaxseed, fish or fish oil, fresh fruits and vegetables.    Psychotherapy  I agree to participate in Individual Therapy (if recommended).    Medication  If prescribed medications, I agree to take them.  Missing doses can result in serious side effects.  I understand that  drinking alcohol, or other illicit drug use, may cause potential side effects.  I will not stop my medication abruptly without first discussing it with my provider.    Staying Connected With Others  I will stay in touch with my friends, family members, and my primary care provider/team.    Use your imagination  Be creative.  We all have a creative side; it doesn t matter if it s oil painting, sand castles, or mud pies! This will also kick up the endorphins.    Witness Beauty  (AKA stop and smell the roses) Take a look outside, even in mid-winter. Notice colors, textures. Watch the squirrels and birds.     Service to others  Be of service to others.  There is always someone else in need.  By helping others we can  get out of ourselves  and remember the really important things.  This also provides opportunities for practicing all the other parts of the program.    Humor  Laugh and be silly!  Adjust your TV habits for less news and crime-drama and more comedy.    Control your stress  Try breathing deep, massage therapy, biofeedback, and meditation. Find time to relax each day.     My support system    Clinic Contact:  Phone number:    Contact 1:  Phone number:    Contact 2:  Phone number:    Rastafarian/:  Phone number:    Therapist:  Phone number:    Local crisis center:    Phone number:    Other community support:  Phone number:

## 2018-07-25 ASSESSMENT — ANXIETY QUESTIONNAIRES: GAD7 TOTAL SCORE: 17

## 2018-07-25 ASSESSMENT — PATIENT HEALTH QUESTIONNAIRE - PHQ9: SUM OF ALL RESPONSES TO PHQ QUESTIONS 1-9: 2

## 2018-07-26 NOTE — PROGRESS NOTES
SUBJECTIVE:   Juanita Lechuga is a 52 year old female who presents to clinic today for the following health issues:      ENT Symptoms             Symptoms: cc Present Absent Comment   Fever/Chills   X    Fatigue   X    Muscle Aches   X    Eye Irritation  X  Pressure behind eyes   Sneezing   X    Nasal Lucien/Drg   X    Sinus Pressure/Pain  X     Loss of smell   X    Dental pain   X    Sore Throat   X    Swollen Glands   X    Ear Pain/Fullness  X     Cough   X    Wheeze   X    Chest Pain   X    Shortness of breath   X    Rash   X    Other         Symptom duration:  3-4 weeks   Symptom severity:  moderate   Treatments tried:  doxycycline Monohydrate 100 MG TABS twice daily started on July 20, 2018.   Contacts:  none              Problem list and histories reviewed & adjusted, as indicated.  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     CARDIOVASCULAR SCREENING; LDL GOAL LESS THAN 130     Advanced directives, counseling/discussion     Situational mixed anxiety and depressive disorder     Adjustment disorder with mixed anxiety and depressed mood     Anxiety     Non morbid obesity due to excess calories     Hypertrophy of breast     Past Surgical History:   Procedure Laterality Date     BUNIONECTOMY RT/LT  8/7/2008    (L) first toe     BUNIONECTOMY RT/LT  9/20/12    right first toe       Social History   Substance Use Topics     Smoking status: Former Smoker     Packs/day: 0.50     Years: 27.00     Types: Cigarettes     Quit date: 10/24/2011     Smokeless tobacco: Never Used     Alcohol use Yes      Comment: rare     Family History   Problem Relation Age of Onset     C.A.D. Father      Coronary Artery Disease Father      Cancer Maternal Grandmother          Current Outpatient Prescriptions   Medication Sig Dispense Refill     carvedilol (COREG CR) 40 MG 24 hr capsule TAKE ONE CAPSULE BY MOUTH ONCE DAILY 90 capsule 0     doxycycline  "Monohydrate 100 MG TABS Take 100 mg by mouth 2 times daily for 14 days 28 tablet 0     fluticasone (FLONASE) 50 MCG/ACT spray INHALE ONE TO TWO SPRAY(S) IN EACH NOSTRIL ONCE DAILY 16 g 3     ibuprofen (ADVIL/MOTRIN) 800 MG tablet Take one 3 X a day with food for sinus pain and pressure 90 tablet 1     losartan-hydrochlorothiazide (HYZAAR) 100-25 MG per tablet Take 1 tablet by mouth daily 90 tablet 4     MULTI-VITAMIN OR TABS 1 TABLET DAILY       Allergies   Allergen Reactions     Lisinopril Cough     BP Readings from Last 3 Encounters:   07/27/18 144/86   07/24/18 142/72   07/20/18 160/90    Wt Readings from Last 3 Encounters:   07/27/18 191 lb 8 oz (86.9 kg)   07/24/18 192 lb 6.4 oz (87.3 kg)   07/20/18 195 lb (88.5 kg)                  Labs reviewed in EPIC    Reviewed and updated as needed this visit by clinical staff  Allergies  Meds       Reviewed and updated as needed this visit by Provider         ROS:  This 52 year old female is here today because she still has a lot of pressure in her face and behind her eyes. Ears ring off and on. Was on Augmentin for sinusitis as seen on CT at the ER 7/13/18. That was not helping her pain so she was seen here in clinic and changed to doxycycline. She is taking Ibuprofen 400 mg BID and it isn't helping. Her friends are all saying she should get an MRI of her head. Then her anxiety ramps up and she can't sleep. She is missing work today because she is so anxious and worried. She is upset that she can't be seen by ENT until next week. She is flying in 40+ days to visit her family in [place] and just can't wait. Doesn't want to feel ill while she is there.  All other review of systems are negative  Personal, family, and social history reviewed with patient and revised.         OBJECTIVE:     /86  Pulse 78  Temp 97.3  F (36.3  C) (Oral)  Resp 16  Ht 5' 1\" (1.549 m)  Wt 191 lb 8 oz (86.9 kg)  SpO2 99%  BMI 36.18 kg/m2  Body mass index is 36.18 kg/(m^2).  GENERAL: " healthy, alert and no distress  EYES: Eyes grossly normal to inspection, PERRL and conjunctivae and sclerae normal  HENT: ear canals and TM's normal, nose and mouth without ulcers or lesions. She is not tender over her maxillary sinuses today.   NECK: no adenopathy, no asymmetry, masses, or scars and thyroid normal to palpation  RESP: lungs clear to auscultation - no rales, rhonchi or wheezes  CV: regular rate and rhythm, normal S1 S2, no S3 or S4, no murmur, click or rub, no peripheral edema and peripheral pulses strong  MS: no gross musculoskeletal defects noted, no edema    Diagnostic Test Results:  none     ASSESSMENT/PLAN:              1. Acute recurrent maxillary sinusitis  As above, she is on correct antibiotic. She needs to increase her Ibuprofen.   - ibuprofen (ADVIL/MOTRIN) 800 MG tablet; Take one 3 X a day with food for sinus pain and pressure  Dispense: 90 tablet; Refill: 1    Return to clinic if no improvement     LUCIANO KIM MD  [facility]

## 2018-07-27 ENCOUNTER — OFFICE VISIT (OUTPATIENT)
Dept: FAMILY MEDICINE | Facility: CLINIC | Age: 53
End: 2018-07-27
Payer: COMMERCIAL

## 2018-07-27 VITALS
RESPIRATION RATE: 16 BRPM | HEIGHT: 61 IN | DIASTOLIC BLOOD PRESSURE: 86 MMHG | TEMPERATURE: 97.3 F | OXYGEN SATURATION: 99 % | HEART RATE: 78 BPM | SYSTOLIC BLOOD PRESSURE: 144 MMHG | BODY MASS INDEX: 36.15 KG/M2 | WEIGHT: 191.5 LBS

## 2018-07-27 DIAGNOSIS — J01.01 ACUTE RECURRENT MAXILLARY SINUSITIS: Primary | ICD-10-CM

## 2018-07-27 PROCEDURE — 99213 OFFICE O/P EST LOW 20 MIN: CPT | Performed by: FAMILY MEDICINE

## 2018-07-27 RX ORDER — IBUPROFEN 800 MG/1
TABLET, FILM COATED ORAL
Qty: 90 TABLET | Refills: 1 | Status: SHIPPED | OUTPATIENT
Start: 2018-07-27 | End: 2018-10-01

## 2018-07-27 ASSESSMENT — PAIN SCALES - GENERAL: PAINLEVEL: NO PAIN (0)

## 2018-07-27 NOTE — PATIENT INSTRUCTIONS
[facility]    If you have any questions regarding to your visit please contact your care team:       Team Purple:   Clinic Hours Telephone Number   Dr. Farida Birch   7am-7pm  Monday - Thursday   7am-5pm  Fridays  [phone]  (Appointment scheduling available 24/7)    Questions about your recent visit?   Team Line:  [phone]   Urgent Care - [place] and [facility] - 11am-9pm Monday-Friday Saturday-Sunday- 9am-5pm   [place] - 5pm-9pm Monday-Friday Saturday-Sunday- 9am-5pm  [phone] - [place]  [phone] [facility]       What options do I have for a visit other than an office visit? We offer electronic visits (e-visits) and telephone visits, when medically appropriate.  Please check with your medical insurance to see if these types of visits are covered, as you will be responsible for any charges that are not paid by your insurance.      You can use Silenseed (secure electronic communication) to access to your chart, send your primary care provider a message, or make an appointment. Ask a team member how to get started.     For a price quote for your services, please call our Consumer Price Line at [phone] or our Imaging Cost estimation line at [phone] (for imaging tests).

## 2018-07-27 NOTE — MR AVS SNAPSHOT
After Visit Summary   7/27/2018    Juanita Lechuga    MRN: [id number]           Patient Information     Date Of Birth          1965        Visit Information        Provider Department      7/27/2018 10:00 AM Farida Cardona MD [facility]        Today's Diagnoses     Acute recurrent maxillary sinusitis    -  1      Care Instructions    [facility]    If you have any questions regarding to your visit please contact your care team:       Team Purple:   Clinic Hours Telephone Number   Dr. Farida Birch   7am-7pm  Monday - Thursday   7am-5pm  Fridays  [phone]  (Appointment scheduling available 24/7)    Questions about your recent visit?   Team Line:  [phone]   Urgent Care - [place] and [facility] - 11am-9pm Monday-Friday Saturday-Sunday- 9am-5pm   [place] - 5pm-9pm Monday-Friday Saturday-Sunday- 9am-5pm  [phone] - [place]  [phone] [facility]       What options do I have for a visit other than an office visit? We offer electronic visits (e-visits) and telephone visits, when medically appropriate.  Please check with your medical insurance to see if these types of visits are covered, as you will be responsible for any charges that are not paid by your insurance.      You can use Kitchfix (secure electronic communication) to access to your chart, send your primary care provider a message, or make an appointment. Ask a team member how to get started.     For a price quote for your services, please call our Consumer Price Line at [phone] or our Imaging Cost estimation line at [phone] (for imaging tests).              Follow-ups after your visit        Your next 10 appointments already scheduled     Aug 02, 2018  3:30 PM CDT   [facility] Krys Thurman   [facility]    [address] [place] [address]    [phone]            Aug 02, 2018  4:00 PM CDT   [facility]    [address]  [place] [address] [phone]            Aug 09, 2018  5:30 PM CDT   [facility] Visit with [facility]ona MD   [facility]    [address]  [place] [address]   [phone]           Bring a current list of meds and any records pertaining to this visit. For Physicals, please bring immunization records and any forms needing to be filled out. Please arrive 10 minutes early to complete paperwork.              Who to contact     If you have questions or need follow up information about today's clinic visit or your schedule please contact [facility] directly at [phone].  Normal or non-critical lab and imaging results will be communicated to you by Demandbasehart, letter or phone within 4 business days after the clinic has received the results. If you do not hear from us within 7 days, please contact the clinic through Viva Visiont or phone. If you have a critical or abnormal lab result, we will notify you by phone as soon as possible.  Submit refill requests through Segopotso or call your pharmacy and they will forward the refill request to us. Please allow 3 business days for your refill to be completed.          Additional Information About Your Visit        Demandbasehart Information     Segopotso gives you secure access to your electronic health record. If you see a primary care provider, you can also send messages to your care team and make appointments. If you have questions, please call your primary care clinic.  If you do not have a primary care provider, please call [phone] and they will assist you.        Care EveryWhere ID     This is your Care EveryWhere ID. This could be used by other organizations to access your [facility] medical records  [id number]        Your Vitals  "Were     Pulse Temperature Respirations Height Pulse Oximetry BMI (Body Mass Index)    78 97.3  F (36.3  C) (Oral) 16 5' 1\" (1.549 m) 99% 36.18 kg/m2       Blood Pressure from Last 3 Encounters:   07/27/18 144/86   07/24/18 142/72   07/20/18 160/90    Weight from Last 3 Encounters:   07/27/18 191 lb 8 oz (86.9 kg)   07/24/18 192 lb 6.4 oz (87.3 kg)   07/20/18 195 lb (88.5 kg)              Today, you had the following     No orders found for display         Today's Medication Changes          These changes are accurate as of 7/27/18 10:12 AM.  If you have any questions, ask your nurse or doctor.               Start taking these medicines.        Dose/Directions    ibuprofen 800 MG tablet   Commonly known as:  ADVIL/MOTRIN   Used for:  Acute recurrent maxillary sinusitis   Started by:  Farida Cardona MD        Take one 3 X a day with food for sinus pain and pressure   Quantity:  90 tablet   Refills:  1            Where to get your medicines      These medications were sent to [facility] [address] [place] [address] [facility]  [address] [facility] [place] [address]     Phone:  [phone]     ibuprofen 800 MG tablet                Primary Care Provider Office Phone # Fax #    Farida Cardona [phone] [address] [facility] [address]        Equal Access to Services     [facility]: Jun osmano Somelvin, waaxda luqadaha, qaybta kaalmada adeegyada, waxay nico strong. So [facility] [phone].    ATENCIÓN: Si chaila dalia, tiene a hudson disposición servicios gratuitos de asistencia lingüística. Wiliam al [phone].    We comply with applicable federal civil rights laws and [place] laws. We do not discriminate on the basis of race, color, national origin, age, disability, sex, sexual orientation, or gender identity.            Thank you!     Thank you for choosing [facility]  for your care. Our goal is " always to provide you with excellent care. Hearing back from our patients is one way we can continue to improve our services. Please take a few minutes to complete the written survey that you may receive in the mail after your visit with us. Thank you!             Your Updated Medication List - Protect others around you: Learn how to safely use, store and throw away your medicines at www.disposemymeds.org.          This list is accurate as of 7/27/18 10:12 AM.  Always use your most recent med list.                   Brand Name Dispense Instructions for use Diagnosis    carvedilol 40 MG 24 hr capsule    COREG CR    90 capsule    TAKE ONE CAPSULE BY MOUTH ONCE DAILY    Benign essential hypertension       doxycycline Monohydrate 100 MG Tabs     28 tablet    Take 100 mg by mouth 2 times daily for 14 days    Sinusitis, unspecified chronicity, unspecified location       fluticasone 50 MCG/ACT spray    FLONASE    16 g    INHALE ONE TO TWO SPRAY(S) IN EACH NOSTRIL ONCE DAILY    Seasonal allergic rhinitis       ibuprofen 800 MG tablet    ADVIL/MOTRIN    90 tablet    Take one 3 X a day with food for sinus pain and pressure    Acute recurrent maxillary sinusitis       losartan-hydrochlorothiazide 100-25 MG per tablet    HYZAAR    90 tablet    Take 1 tablet by mouth daily    Benign essential hypertension       Multi-vitamin Tabs tablet   Generic drug:  multivitamin, therapeutic with minerals      1 TABLET DAILY    Knee pain

## 2018-08-02 ENCOUNTER — OFFICE VISIT (OUTPATIENT)
Dept: OTOLARYNGOLOGY | Facility: CLINIC | Age: 53
End: 2018-08-02
Payer: COMMERCIAL

## 2018-08-02 ENCOUNTER — OFFICE VISIT (OUTPATIENT)
Dept: AUDIOLOGY | Facility: CLINIC | Age: 53
End: 2018-08-02
Payer: COMMERCIAL

## 2018-08-02 VITALS
HEIGHT: 61 IN | WEIGHT: 190 LBS | BODY MASS INDEX: 35.87 KG/M2 | SYSTOLIC BLOOD PRESSURE: 184 MMHG | DIASTOLIC BLOOD PRESSURE: 99 MMHG | OXYGEN SATURATION: 98 % | HEART RATE: 87 BPM | RESPIRATION RATE: 16 BRPM

## 2018-08-02 DIAGNOSIS — H93.8X3 EAR FULLNESS, BILATERAL: Primary | ICD-10-CM

## 2018-08-02 DIAGNOSIS — R44.8 FACIAL PRESSURE: ICD-10-CM

## 2018-08-02 DIAGNOSIS — H93.11 RIGHT-SIDED TINNITUS: Primary | ICD-10-CM

## 2018-08-02 DIAGNOSIS — R51.9 CHRONIC DAILY HEADACHE: ICD-10-CM

## 2018-08-02 PROCEDURE — 92550 TYMPANOMETRY & REFLEX THRESH: CPT | Performed by: AUDIOLOGIST

## 2018-08-02 PROCEDURE — 99244 OFF/OP CNSLTJ NEW/EST MOD 40: CPT | Performed by: OTOLARYNGOLOGY

## 2018-08-02 PROCEDURE — 92557 COMPREHENSIVE HEARING TEST: CPT | Performed by: AUDIOLOGIST

## 2018-08-02 PROCEDURE — 99207 ZZC NO CHARGE LOS: CPT | Performed by: AUDIOLOGIST

## 2018-08-02 NOTE — PATIENT INSTRUCTIONS
Scheduling Information  To schedule your CT/MRI scan, please contact [facility] at [phone] OR [facility] at [phone]    To schedule your Surgery, please contact our Specialty Schedulers at [phone]      ENT Clinic Locations Clinic Hours Telephone Number     [facility]  [address] [place] [address]   Monday:           1:00pm -- 5:00pm    Friday:              8:00am - 12:00pm   To schedule/reschedule an appointment with   Dr. Almaraz,   please contact our   Specialty Scheduling Department at:     [phone]       [facility]  [address] [address] Tuesday:          8:00am -- 2:00pm         Urgent Care Locations Clinic Hours Telephone Numbers     [facility]  [address] [place] [address]     Monday-Friday:     11:00am - 9:00pm    Saturday-Sunday:  9:00am - 5:00pm   [phone]     [facility]  [address] [place] [address]     Monday-Friday:      5:00pm - 9:00pm     Saturday-Sunday:  9:00am - 5:00pm   [phone]

## 2018-08-02 NOTE — PROGRESS NOTES
AUDIOLOGY REPORT:    Patient was referred to Audiology from ENT by Dr. Almaraz for a hearing examination. Patient reports a right ear tinnitus which began approximately 4 weeks ago. Patient also reports a significant amount of sinus pressure as well as pressure behind her eyes.     Testing:    Otoscopy:   Otoscopic exam indicates ears are clear of cerumen bilaterally     Tympanograms:    RIGHT: normal eardrum mobility     LEFT:   normal eardrum mobility    Reflexes (reported by stimulus ear):  RIGHT: Ipsilateral is present at normal levels  RIGHT: Contralateral is present at normal levels  LEFT:   Ipsilateral is present at normal levels  LEFT:   Contralateral is present at normal levels    Thresholds:   Pure Tone Thresholds assessed using conventional audiometry with good  reliability from 250-8000 Hz bilaterally using insert earphones     RIGHT:  normal hearing sensitivity for all frequencies tested.     LEFT:    normal hearing sensitivity for all frequencies tested.     Speech Reception Threshold:    RIGHT: 10 dB HL    LEFT:   10 dB HL    Word Recognition Score:     RIGHT: 100% at 50 dB HL using NU-6 recorded word list.    LEFT:   100% at 50 dB HL using NU-6 recorded word list.    Discussed results with the patient.     Patient was returned to ENT for follow up.     Esteban Peterson CCC-A  Licensed Audiologist  8/2/2018

## 2018-08-02 NOTE — LETTER
8/2/2018         RE: Juanita Lechuga  [address]  [place] [address]        Dear Colleague,    Thank you for referring your patient, Juanita Lechuga, to the [facility]. Please see a copy of my visit note below.    History of Present Illness - Juanita Lechuga is a 52 year old female here to see me for the first time due to pressure behind her eyes, as well as a sense of plugging in the ears, here at the consultation of Dr. Cardona.    This started about 4-5 weeks ago, when she started to get pressure behind and around the eyes.  Also, she felt pressure and a deep ache in the ears.  She ended up going to the ER, and EKG, CT scan of the head, and labs, and all was normal.  She was diagnosed with a sinusitis, and was treated with antibiotics.  It didn't help, and at follow up with PCP and she was placed on Doxycycline for two weeks.  It was only marginally helpful, but there is still pressure behind the eyes.  Also, she notes that she has become sensitive to bright light as well.    She tells me that her vision was just recently checked and normal.  Also, she has no history of any ENT disease or surgery.  She is not sure if she clenches her teeth.  No dental issues.  Of note, she had no congestion or post nasal drainage when this was happening.    Past Medical History -   Patient Active Problem List   Diagnosis     Old disruption of anterior cruciate ligament     CARDIOVASCULAR SCREENING; LDL GOAL LESS THAN 130     Advanced directives, counseling/discussion     Situational mixed anxiety and depressive disorder     Adjustment disorder with mixed anxiety and depressed mood     Anxiety     Non morbid obesity due to excess calories     Hypertrophy of breast       Current Medications -   Current Outpatient Prescriptions:      carvedilol (COREG CR) 40 MG 24 hr capsule, TAKE ONE CAPSULE BY MOUTH ONCE DAILY, Disp: 90 capsule, Rfl: 0     doxycycline Monohydrate 100 MG TABS, Take 100 mg by mouth 2  "times daily for 14 days, Disp: 28 tablet, Rfl: 0     fluticasone (FLONASE) 50 MCG/ACT spray, INHALE ONE TO TWO SPRAY(S) IN EACH NOSTRIL ONCE DAILY, Disp: 16 g, Rfl: 3     ibuprofen (ADVIL/MOTRIN) 800 MG tablet, Take one 3 X a day with food for sinus pain and pressure, Disp: 90 tablet, Rfl: 1     losartan-hydrochlorothiazide (HYZAAR) 100-25 MG per tablet, Take 1 tablet by mouth daily, Disp: 90 tablet, Rfl: 4     MULTI-VITAMIN OR TABS, 1 TABLET DAILY, Disp: , Rfl:     Allergies -   Allergies   Allergen Reactions     Lisinopril Cough       Social History -   Social History     Social History     Marital status:      Spouse name: N/A     Number of children: 1     Years of education: N/A     Occupational History      [employer]     Social History Main Topics     Smoking status: Former Smoker     Packs/day: 0.50     Years: 27.00     Types: Cigarettes     Quit date: 10/24/2011     Smokeless tobacco: Never Used     Alcohol use Yes      Comment: rare     Drug use: No     Sexual activity: Yes     Partners: Male     Birth control/ protection: Condom     Other Topics Concern     Parent/Sibling W/ Cabg, Mi Or Angioplasty Before 65f 55m? Yes     Social History Narrative       Family History -   Family History   Problem Relation Age of Onset     C.A.D. Father      Coronary Artery Disease Father      Cancer Maternal Grandmother        Review of Systems - As per HPI and PMHx, otherwise 10+ system review of the head and neck, and general constitution is negative.    Physical Exam  BP (!) 184/99  Pulse 87  Resp 16  Ht 1.549 m (5' 1\")  Wt 86.2 kg (190 lb)  SpO2 98%  BMI 35.9 kg/m2    General - The patient is well nourished and well developed, and appears to have good nutritional status.  Alert and oriented to person and place, answers questions and cooperates with examination appropriately.   Head and Face - Normocephalic and atraumatic, with no gross asymmetry noted of the contour of the facial features.  The facial nerve " is intact, with strong symmetric movements.  Voice and Breathing - The patient was breathing comfortably without the use of accessory muscles. There was no wheezing, stridor, or stertor.  The patients voice was clear and strong, and had appropriate pitch and quality.  Ears - The tympanic membranes are normal in appearance, bony landmarks are intact.  No retraction, perforation, or masses.  No fluid or purulence was seen in the external canal or the middle ear. No evidence of infection of the middle ear or external canal, cerumen was normal in appearance.  Eyes - Extraocular movements intact, and the pupils were reactive to light.  Sclera were not icteric or injected, conjunctiva were pink and moist.  Mouth - Examination of the oral cavity showed pink, healthy oral mucosa. No lesions or ulcerations noted.  The tongue was mobile and midline, and the dentition were in good condition.    Throat - The walls of the oropharynx were smooth, pink, moist, symmetric, and had no lesions or ulcerations.  The tonsillar pillars and soft palate were symmetric.  The uvula was midline on elevation.    Neck - Normal midline excursion of the laryngotracheal complex during swallowing.  Full range of motion on passive movement.  Palpation of the occipital, submental, submandibular, internal jugular chain, and supraclavicular nodes did not demonstrate any abnormal lymph nodes or masses.  The carotid pulse was palpable bilaterally.  Palpation of the thyroid was soft and smooth, with no nodules or goiter appreciated.  The trachea was mobile and midline.  Nose - External contour is symmetric, no gross deflection or scars.  Nasal mucosa is pink and moist with no abnormal mucus.  The septum was midline and non-obstructive, turbinates of normal size and position.  No polyps, masses, or purulence noted on examination.    Audiologic Studies - An audiogram and tympanogram were performed today as part of the evaluation and personally reviewed. The  tympanogram shows a normal Type A curve, with normal canal volume and middle ear pressure.  There is no sign of eustachian tube dysfunction or middle ear effusion.  The audiogram was also normal.  The sensorineural hearing was age-appropriate, with no evidence of conductive hearing loss or significant asymmetry.      A/P - Juanita Lechuga is a 52 year old female  (H93.8X3) Ear fullness, bilateral  (primary encounter diagnosis)  (R68.89) Facial pressure  (R51) Chronic daily headache    I will order a sinus CT to make absolutely sure that sinusitis is not the issue.    But overall, the picture seems most consistent with Migraine.  The light sensitivity, improvement with cold and dark and laying down, the lack of any nasal symptoms or ENT history, and the tinnitus when the headache starts - all suggest migraine.    I will call her with the CT results.    Again, thank you for allowing me to participate in the care of your patient.        Sincerely,        Micky Almaraz MD

## 2018-08-02 NOTE — MR AVS SNAPSHOT
After Visit Summary   8/2/2018    Juanita Lechuga    MRN: [id number]           Patient Information     Date Of Birth          1965        Visit Information        Provider Department      8/2/2018 4:00 PM Micky Almaraz MD [facility]        Today's Diagnoses     Ear fullness, bilateral    -  1    Facial pressure        Chronic daily headache          Care Instructions    Scheduling Information  To schedule your CT/MRI scan, please contact [facility] at [phone] OR [facility] at [phone]    To schedule your Surgery, please contact our Specialty Schedulers at [phone]      ENT Clinic Locations Clinic Hours Telephone Number     [facility] [place]  [address]  [place] [address]   Monday:           1:00pm -- 5:00pm    Friday:              8:00am - 12:00pm   To schedule/reschedule an appointment with   Dr. Almaraz,   please contact our   Specialty Scheduling Department at:     [phone]       [facility]  [address]  [place] [address] Tuesday:          8:00am -- 2:00pm         Urgent Care Locations Clinic Hours Telephone Numbers     [facility]  [address] [place] [address]     Monday-Friday:     11:00am - 9:00pm    Saturday-Sunday:  9:00am - 5:00pm   [phone]     [facility]  [address] [address]     Monday-Friday:      5:00pm - 9:00pm     Saturday-Sunday:  9:00am - 5:00pm   [phone]                 Follow-ups after your visit        Your next 10 appointments already scheduled     Aug 09, 2018  5:30 PM CDT   Office Visit with Farida Cardona MD   [facility] ([facility])    [address] [facility] [id number] [phone]           Bring a current list of meds and any records pertaining to this visit. For Physicals, please bring immunization records and any forms needing to be filled out. Please arrive 10 minutes early to  "complete paperwork.              Future tests that were ordered for you today     Open Future Orders        Priority Expected Expires Ordered    CT Facial Bones without Contrast Routine  8/2/2019 8/2/2018            Who to contact     If you have questions or need follow up information about today's clinic visit or your schedule please contact [facility] directly at [phone].  Normal or non-critical lab and imaging results will be communicated to you by MyChart, letter or phone within 4 business days after the clinic has received the results. If you do not hear from us within 7 days, please contact the clinic through Rapid7hart or phone. If you have a critical or abnormal lab result, we will notify you by phone as soon as possible.  Submit refill requests through Zirtual or call your pharmacy and they will forward the refill request to us. Please allow 3 business days for your refill to be completed.          Additional Information About Your Visit        Rapid7harAvenger Networks Information     Zirtual gives you secure access to your electronic health record. If you see a primary care provider, you can also send messages to your care team and make appointments. If you have questions, please call your primary care clinic.  If you do not have a primary care provider, please call [phone] and they will assist you.        Care EveryWhere ID     This is your Care EveryWhere ID. This could be used by other organizations to access your [place] medical records  [id number]        Your Vitals Were     Pulse Respirations Height Pulse Oximetry BMI (Body Mass Index)       87 16 1.549 m (5' 1\") 98% 35.9 kg/m2        Blood Pressure from Last 3 Encounters:   08/02/18 (!) 184/99   07/27/18 144/86   07/24/18 142/72    Weight from Last 3 Encounters:   08/02/18 86.2 kg (190 lb)   07/27/18 86.9 kg (191 lb 8 oz)   07/24/18 87.3 kg (192 lb 6.4 oz)               Primary Care Provider Office Phone # Fax #    Farida Cardona MD " [phone]       [address] [address]        Equal Access to Services     JESSICA CASTELLANO : Hadii aad ku hadethan Hoover, margie grazynagigiha, anika razo, loree tavares arammarquez estrada laAbielellis strong. So [facility] [phone].    ATENCIÓN: Si habla español, tiene a hudson disposición servicios gratuitos de asistencia lingüística. Llame al [phone].    We comply with applicable federal civil rights laws and [place] laws. We do not discriminate on the basis of race, color, national origin, age, disability, sex, sexual orientation, or gender identity.            Thank you!     Thank you for choosing [facility]  for your care. Our goal is always to provide you with excellent care. Hearing back from our patients is one way we can continue to improve our services. Please take a few minutes to complete the written survey that you may receive in the mail after your visit with us. Thank you!             Your Updated Medication List - Protect others around you: Learn how to safely use, store and throw away your medicines at www.disposemymeds.org.          This list is accurate as of 8/2/18  4:10 PM.  Always use your most recent med list.                   Brand Name Dispense Instructions for use Diagnosis    carvedilol 40 MG 24 hr capsule    COREG CR    90 capsule    TAKE ONE CAPSULE BY MOUTH ONCE DAILY    Benign essential hypertension       doxycycline Monohydrate 100 MG Tabs     28 tablet    Take 100 mg by mouth 2 times daily for 14 days    Sinusitis, unspecified chronicity, unspecified location       fluticasone 50 MCG/ACT spray    FLONASE    16 g    INHALE ONE TO TWO SPRAY(S) IN EACH NOSTRIL ONCE DAILY    Seasonal allergic rhinitis       ibuprofen 800 MG tablet    ADVIL/MOTRIN    90 tablet    Take one 3 X a day with food for sinus pain and pressure    Acute recurrent maxillary sinusitis       losartan-hydrochlorothiazide 100-25 MG per tablet    HYZAAR    90  tablet    Take 1 tablet by mouth daily    Benign essential hypertension       Multi-vitamin Tabs tablet   Generic drug:  multivitamin, therapeutic with minerals      1 TABLET DAILY    Knee pain

## 2018-08-02 NOTE — MR AVS SNAPSHOT
After Visit Summary   8/2/2018    Juanita Lechuga    MRN: [id number]           Patient Information     Date Of Birth          1965        Visit Information        Provider Department      8/2/2018 3:30 PM Esteban Valdez AuD [facility]        Today's Diagnoses     Right-sided tinnitus    -  1       Follow-ups after your visit        Your next 10 appointments already scheduled     Aug 02, 2018  4:00 PM CDT   New Visit with Micky Almaraz MD   [facility]    [address] [id number] [phone]            Aug 09, 2018  5:30 PM CDT   Office Visit with Farida Cardona MD   [facility]    [address] [place] [id number] [phone]           Bring a current list of meds and any records pertaining to this visit. For Physicals, please bring immunization records and any forms needing to be filled out. Please arrive 10 minutes early to complete paperwork.              Who to contact     If you have questions or need follow up information about today's clinic visit or your schedule please contact [facility] directly at [phone].  Normal or non-critical lab and imaging results will be communicated to you by Logicworkshart, letter or phone within 4 business days after the clinic has received the results. If you do not hear from us within 7 days, please contact the clinic through Logicworkshart or phone. If you have a critical or abnormal lab result, we will notify you by phone as soon as possible.  Submit refill requests through PingTank or call your pharmacy and they will forward the refill request to us. Please allow 3 business days for your refill to be completed.          Additional Information About Your Visit        LogicworksharSiteJabber Information     PingTank gives you secure access to your electronic health record. If you see a primary care provider, you can also  send messages to your care team and make appointments. If you have questions, please call your primary care clinic.  If you do not have a primary care provider, please call [phone] and they will assist you.        Care EveryWhere ID     This is your Care EveryWhere ID. This could be used by other organizations to access your [place] medical records  [id number]         Blood Pressure from Last 3 Encounters:   07/27/18 144/86   07/24/18 142/72   07/20/18 160/90    Weight from Last 3 Encounters:   07/27/18 191 lb 8 oz (86.9 kg)   07/24/18 192 lb 6.4 oz (87.3 kg)   07/20/18 195 lb (88.5 kg)              We Performed the Following     AUDIOGRAM/TYMPANOGRAM - INTERFACE     COMPREHENSIVE HEARING TEST     TYMPANOMETRY AND REFLEX THRESHOLD MEASUREMENTS        Primary Care Provider Office Phone # Fax #    Farida Cardona [phone] [address] [address]        Equal Access to Services     OLI CASTELLANO : Hadii aad ku hadasho Soomaali, waaxda luqadaha, qaybta kaalmada adeegyada, waxay idiin hayvijayn kendrick monet . So [place] [phone].    ATENCIÓN: Si habla español, tiene a hudson disposición servicios gratuitos de asistencia lingüística. [facility] [phone].    We comply with applicable federal civil rights laws and [place] laws. We do not discriminate on the basis of race, color, national origin, age, disability, sex, sexual orientation, or gender identity.            Thank you!     Thank you for choosing [facility]  for your care. Our goal is always to provide you with excellent care. Hearing back from our patients is one way we can continue to improve our services. Please take a few minutes to complete the written survey that you may receive in the mail after your visit with us. Thank you!             Your Updated Medication List - Protect others around you: Learn how to safely use, store and throw away your medicines at  www.disposemymeds.org.          This list is accurate as of 8/2/18  3:36 PM.  Always use your most recent med list.                   Brand Name Dispense Instructions for use Diagnosis    carvedilol 40 MG 24 hr capsule    COREG CR    90 capsule    TAKE ONE CAPSULE BY MOUTH ONCE DAILY    Benign essential hypertension       doxycycline Monohydrate 100 MG Tabs     28 tablet    Take 100 mg by mouth 2 times daily for 14 days    Sinusitis, unspecified chronicity, unspecified location       fluticasone 50 MCG/ACT spray    FLONASE    16 g    INHALE ONE TO TWO SPRAY(S) IN EACH NOSTRIL ONCE DAILY    Seasonal allergic rhinitis       ibuprofen 800 MG tablet    ADVIL/MOTRIN    90 tablet    Take one 3 X a day with food for sinus pain and pressure    Acute recurrent maxillary sinusitis       losartan-hydrochlorothiazide 100-25 MG per tablet    HYZAAR    90 tablet    Take 1 tablet by mouth daily    Benign essential hypertension       Multi-vitamin Tabs tablet   Generic drug:  multivitamin, therapeutic with minerals      1 TABLET DAILY    Knee pain

## 2018-08-02 NOTE — PROGRESS NOTES
History of Present Illness - Juanita Lechuga is a 52 year old female here to see me for the first time due to pressure behind her eyes, as well as a sense of plugging in the ears, here at the consultation of Dr. Cardona.    This started about 4-5 weeks ago, when she started to get pressure behind and around the eyes.  Also, she felt pressure and a deep ache in the ears.  She ended up going to the ER, and EKG, CT scan of the head, and labs, and all was normal.  She was diagnosed with a sinusitis, and was treated with antibiotics.  It didn't help, and at follow up with PCP and she was placed on Doxycycline for two weeks.  It was only marginally helpful, but there is still pressure behind the eyes.  Also, she notes that she has become sensitive to bright light as well.    She tells me that her vision was just recently checked and normal.  Also, she has no history of any ENT disease or surgery.  She is not sure if she clenches her teeth.  No dental issues.  Of note, she had no congestion or post nasal drainage when this was happening.    Past Medical History -   Patient Active Problem List   Diagnosis     Old disruption of anterior cruciate ligament     CARDIOVASCULAR SCREENING; LDL GOAL LESS THAN 130     Advanced directives, counseling/discussion     Situational mixed anxiety and depressive disorder     Adjustment disorder with mixed anxiety and depressed mood     Anxiety     Non morbid obesity due to excess calories     Hypertrophy of breast       Current Medications -   Current Outpatient Prescriptions:      carvedilol (COREG CR) 40 MG 24 hr capsule, TAKE ONE CAPSULE BY MOUTH ONCE DAILY, Disp: 90 capsule, Rfl: 0     doxycycline Monohydrate 100 MG TABS, Take 100 mg by mouth 2 times daily for 14 days, Disp: 28 tablet, Rfl: 0     fluticasone (FLONASE) 50 MCG/ACT spray, INHALE ONE TO TWO SPRAY(S) IN EACH NOSTRIL ONCE DAILY, Disp: 16 g, Rfl: 3     ibuprofen (ADVIL/MOTRIN) 800 MG tablet, Take one 3 X a day with food for  "sinus pain and pressure, Disp: 90 tablet, Rfl: 1     losartan-hydrochlorothiazide (HYZAAR) 100-25 MG per tablet, Take 1 tablet by mouth daily, Disp: 90 tablet, Rfl: 4     MULTI-VITAMIN OR TABS, 1 TABLET DAILY, Disp: , Rfl:     Allergies -   Allergies   Allergen Reactions     Lisinopril Cough       Social History -   Social History     Social History     Marital status:      Spouse name: N/A     Number of children: 1     Years of education: N/A     Occupational History      [employer]     Social History Main Topics     Smoking status: Former Smoker     Packs/day: 0.50     Years: 27.00     Types: Cigarettes     Quit date: 10/24/2011     Smokeless tobacco: Never Used     Alcohol use Yes      Comment: rare     Drug use: No     Sexual activity: Yes     Partners: Male     Birth control/ protection: Condom     Other Topics Concern     Parent/Sibling W/ Cabg, Mi Or Angioplasty Before 65f 55m? Yes     Social History Narrative       Family History -   Family History   Problem Relation Age of Onset     C.A.D. Father      Coronary Artery Disease Father      Cancer Maternal Grandmother        Review of Systems - As per HPI and PMHx, otherwise 10+ system review of the head and neck, and general constitution is negative.    Physical Exam  BP (!) 184/99  Pulse 87  Resp 16  Ht 1.549 m (5' 1\")  Wt 86.2 kg (190 lb)  SpO2 98%  BMI 35.9 kg/m2    General - The patient is well nourished and well developed, and appears to have good nutritional status.  Alert and oriented to person and place, answers questions and cooperates with examination appropriately.   Head and Face - Normocephalic and atraumatic, with no gross asymmetry noted of the contour of the facial features.  The facial nerve is intact, with strong symmetric movements.  Voice and Breathing - The patient was breathing comfortably without the use of accessory muscles. There was no wheezing, stridor, or stertor.  The patients voice was clear and strong, and had " appropriate pitch and quality.  Ears - The tympanic membranes are normal in appearance, bony landmarks are intact.  No retraction, perforation, or masses.  No fluid or purulence was seen in the external canal or the middle ear. No evidence of infection of the middle ear or external canal, cerumen was normal in appearance.  Eyes - Extraocular movements intact, and the pupils were reactive to light.  Sclera were not icteric or injected, conjunctiva were pink and moist.  Mouth - Examination of the oral cavity showed pink, healthy oral mucosa. No lesions or ulcerations noted.  The tongue was mobile and midline, and the dentition were in good condition.    Throat - The walls of the oropharynx were smooth, pink, moist, symmetric, and had no lesions or ulcerations.  The tonsillar pillars and soft palate were symmetric.  The uvula was midline on elevation.    Neck - Normal midline excursion of the laryngotracheal complex during swallowing.  Full range of motion on passive movement.  Palpation of the occipital, submental, submandibular, internal jugular chain, and supraclavicular nodes did not demonstrate any abnormal lymph nodes or masses.  The carotid pulse was palpable bilaterally.  Palpation of the thyroid was soft and smooth, with no nodules or goiter appreciated.  The trachea was mobile and midline.  Nose - External contour is symmetric, no gross deflection or scars.  Nasal mucosa is pink and moist with no abnormal mucus.  The septum was midline and non-obstructive, turbinates of normal size and position.  No polyps, masses, or purulence noted on examination.    Audiologic Studies - An audiogram and tympanogram were performed today as part of the evaluation and personally reviewed. The tympanogram shows a normal Type A curve, with normal canal volume and middle ear pressure.  There is no sign of eustachian tube dysfunction or middle ear effusion.  The audiogram was also normal.  The sensorineural hearing was  age-appropriate, with no evidence of conductive hearing loss or significant asymmetry.      A/P - Juanita Lechuga is a 52 year old female  (H93.8X3) Ear fullness, bilateral  (primary encounter diagnosis)  (R68.89) Facial pressure  (R51) Chronic daily headache    I will order a sinus CT to make absolutely sure that sinusitis is not the issue.    But overall, the picture seems most consistent with Migraine.  The light sensitivity, improvement with cold and dark and laying down, the lack of any nasal symptoms or ENT history, and the tinnitus when the headache starts - all suggest migraine.    I will call her with the CT results.

## 2018-08-03 ENCOUNTER — RADIANT APPOINTMENT (OUTPATIENT)
Dept: CT IMAGING | Facility: CLINIC | Age: 53
End: 2018-08-03
Attending: OTOLARYNGOLOGY
Payer: COMMERCIAL

## 2018-08-03 DIAGNOSIS — R44.8 FACIAL PRESSURE: ICD-10-CM

## 2018-08-03 DIAGNOSIS — H93.8X3 EAR FULLNESS, BILATERAL: ICD-10-CM

## 2018-08-03 PROCEDURE — 70486 CT MAXILLOFACIAL W/O DYE: CPT | Mod: TC

## 2018-08-06 ENCOUNTER — MYC MEDICAL ADVICE (OUTPATIENT)
Dept: OTOLARYNGOLOGY | Facility: CLINIC | Age: 53
End: 2018-08-06

## 2018-08-06 ENCOUNTER — OFFICE VISIT (OUTPATIENT)
Dept: FAMILY MEDICINE | Facility: CLINIC | Age: 53
End: 2018-08-06
Payer: COMMERCIAL

## 2018-08-06 VITALS
BODY MASS INDEX: 35.95 KG/M2 | TEMPERATURE: 97.8 F | RESPIRATION RATE: 20 BRPM | DIASTOLIC BLOOD PRESSURE: 70 MMHG | SYSTOLIC BLOOD PRESSURE: 150 MMHG | HEIGHT: 61 IN | HEART RATE: 72 BPM | WEIGHT: 190.4 LBS | OXYGEN SATURATION: 98 %

## 2018-08-06 DIAGNOSIS — J01.01 ACUTE RECURRENT MAXILLARY SINUSITIS: ICD-10-CM

## 2018-08-06 DIAGNOSIS — F43.23 SITUATIONAL MIXED ANXIETY AND DEPRESSIVE DISORDER: ICD-10-CM

## 2018-08-06 DIAGNOSIS — J32.4 CHRONIC PANSINUSITIS: Primary | ICD-10-CM

## 2018-08-06 DIAGNOSIS — F41.9 ANXIETY: ICD-10-CM

## 2018-08-06 DIAGNOSIS — I10 BENIGN ESSENTIAL HYPERTENSION: Primary | ICD-10-CM

## 2018-08-06 PROCEDURE — 99213 OFFICE O/P EST LOW 20 MIN: CPT | Performed by: FAMILY MEDICINE

## 2018-08-06 RX ORDER — CARVEDILOL PHOSPHATE 40 MG/1
CAPSULE, EXTENDED RELEASE ORAL
Qty: 90 CAPSULE | Refills: 3 | Status: SHIPPED | OUTPATIENT
Start: 2018-08-06 | End: 2019-04-29

## 2018-08-06 RX ORDER — CITALOPRAM HYDROBROMIDE 20 MG/1
20 TABLET ORAL DAILY
COMMUNITY
End: 2019-01-10

## 2018-08-06 RX ORDER — PREDNISONE 10 MG/1
10 TABLET ORAL 2 TIMES DAILY
Qty: 10 TABLET | Refills: 1 | Status: SHIPPED | OUTPATIENT
Start: 2018-08-06 | End: 2018-08-11

## 2018-08-06 NOTE — LETTER
My Depression Action Plan  Name: Juanita Lechuga   Date of Birth 1965  Date: 8/6/2018    My doctor: Farida Cardona   My clinic: [address]  [place] [address]  [phone]          GREEN    ZONE   Good Control    What it looks like:     Things are going generally well. You have normal up s and down s. You may even feel depressed from time to time, but bad moods usually last less than a day.   What you need to do:  1. Continue to care for yourself (see self care plan)  2. Check your depression survival kit and update it as needed  3. Follow your physician s recommendations including any medication.  4. Do not stop taking medication unless you consult with your physician first.           YELLOW         ZONE Getting Worse    What it looks like:     Depression is starting to interfere with your life.     It may be hard to get out of bed; you may be starting to isolate yourself from others.    Symptoms of depression are starting to last most all day and this has happened for several days.     You may have suicidal thoughts but they are not constant.   What you need to do:     1. Call your care team, your response to treatment will improve if you keep your care team informed of your progress. Yellow periods are signs an adjustment may need to be made.     2. Continue your self-care, even if you have to fake it!    3. Talk to someone in your support network    4. Open up your depression survival kit           RED    ZONE Medical Alert - Get Help    What it looks like:     Depression is seriously interfering with your life.     You may experience these or other symptoms: You can t get out of bed most days, can t work or engage in other necessary activities, you have trouble taking care of basic hygiene, or basic responsibilities, thoughts of suicide or death that will not go away, self-injurious behavior.     What you need to do:  1. Call your care team and  request a same-day appointment. If they are not available (weekends or after hours) call your local crisis line, emergency room or 911.            Depression Self Care Plan / Survival Kit    Self-Care for Depression  Here s the deal. Your body and mind are really not as separate as most people think.  What you do and think affects how you feel and how you feel influences what you do and think. This means if you do things that people who feel good do, it will help you feel better.  Sometimes this is all it takes.  There is also a place for medication and therapy depending on how severe your depression is, so be sure to consult with your medical provider and/ or Behavioral Health Consultant if your symptoms are worsening or not improving.     In order to better manage my stress, I will:    Exercise  Get some form of exercise, every day. This will help reduce pain and release endorphins, the  feel good  chemicals in your brain. This is almost as good as taking antidepressants!  This is not the same as joining a gym and then never going! (they count on that by the way ) It can be as simple as just going for a walk or doing some gardening, anything that will get you moving.      Hygiene   Maintain good hygiene (Get out of bed in the morning, Make your bed, Brush your teeth, Take a shower, and Get dressed like you were going to work, even if you are unemployed).  If your clothes don't fit try to get ones that do.    Diet  I will strive to eat foods that are good for me, drink plenty of water, and avoid excessive sugar, caffeine, alcohol, and other mood-altering substances.  Some foods that are helpful in depression are: complex carbohydrates, B vitamins, flaxseed, fish or fish oil, fresh fruits and vegetables.    Psychotherapy  I agree to participate in Individual Therapy (if recommended).    Medication  If prescribed medications, I agree to take them.  Missing doses can result in serious side effects.  I understand that  drinking alcohol, or other illicit drug use, may cause potential side effects.  I will not stop my medication abruptly without first discussing it with my provider.    Staying Connected With Others  I will stay in touch with my friends, family members, and my primary care provider/team.    Use your imagination  Be creative.  We all have a creative side; it doesn t matter if it s oil painting, sand castles, or mud pies! This will also kick up the endorphins.    Witness Beauty  (AKA stop and smell the roses) Take a look outside, even in mid-winter. Notice colors, textures. Watch the squirrels and birds.     Service to others  Be of service to others.  There is always someone else in need.  By helping others we can  get out of ourselves  and remember the really important things.  This also provides opportunities for practicing all the other parts of the program.    Humor  Laugh and be silly!  Adjust your TV habits for less news and crime-drama and more comedy.    Control your stress  Try breathing deep, massage therapy, biofeedback, and meditation. Find time to relax each day.     My support system    Clinic Contact:  Phone number:    Contact 1:  Phone number:    Contact 2:  Phone number:    Anabaptism/:  Phone number:    Therapist:  Phone number:    Local crisis center:    Phone number:    Other community support:  Phone number:

## 2018-08-06 NOTE — PROGRESS NOTES
SUBJECTIVE:   Juanita Lechuga is a 52 year old female who presents to clinic today for the following health issues:      Hypertension Follow-up      Outpatient blood pressures are being checked at home.  Results are 145/70 to 200/80.    Low Salt Diet: low salt    Anxiety Follow-Up    Status since last visit: Worsened     Other associated symptoms:increased BP    Complicating factors:   Significant life event: No   Current substance abuse: None  Depression symptoms: No  CANDELARIO-7 SCORE 4/5/2012 1/18/2016 7/24/2018   Total Score 13 - -   Total Score - 0 17       CANDELARIO-7    Amount of exercise or physical activity: None    Problems taking medications regularly: No    Medication side effects: none    Diet: eating healthy foods              Problem list and histories reviewed & adjusted, as indicated.  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     CARDIOVASCULAR SCREENING; LDL GOAL LESS THAN 130     Advanced directives, counseling/discussion     Situational mixed anxiety and depressive disorder     Adjustment disorder with mixed anxiety and depressed mood     Anxiety     Non morbid obesity due to excess calories     Hypertrophy of breast     Facial pressure     Ear fullness, bilateral     Past Surgical History:   Procedure Laterality Date     BUNIONECTOMY RT/LT  8/7/2008    (L) first toe     BUNIONECTOMY RT/LT  9/20/12    right first toe       Social History   Substance Use Topics     Smoking status: Former Smoker     Packs/day: 0.50     Years: 27.00     Types: Cigarettes     Quit date: 10/24/2011     Smokeless tobacco: Never Used     Alcohol use Yes      Comment: rare     Family History   Problem Relation Age of Onset     C.A.D. Father      Coronary Artery Disease Father      Cancer Maternal Grandmother          Current Outpatient Prescriptions   Medication Sig Dispense Refill     carvedilol (COREG CR) 40 MG 24 hr capsule TAKE ONE CAPSULE BY  MOUTH ONCE DAILY 90 capsule 3     citalopram (CELEXA) 20 MG tablet Take 20 mg by mouth daily       ibuprofen (ADVIL/MOTRIN) 800 MG tablet Take one 3 X a day with food for sinus pain and pressure 90 tablet 1     losartan-hydrochlorothiazide (HYZAAR) 100-25 MG per tablet Take 1 tablet by mouth daily 90 tablet 4     MULTI-VITAMIN OR TABS 1 TABLET DAILY       [DISCONTINUED] carvedilol (COREG CR) 40 MG 24 hr capsule TAKE ONE CAPSULE BY MOUTH ONCE DAILY 90 capsule 0     Allergies   Allergen Reactions     Lisinopril Cough     BP Readings from Last 3 Encounters:   08/06/18 150/70   08/02/18 (!) 184/99   07/27/18 144/86    Wt Readings from Last 3 Encounters:   08/06/18 190 lb 6.4 oz (86.4 kg)   08/02/18 190 lb (86.2 kg)   07/27/18 191 lb 8 oz (86.9 kg)                  Labs reviewed in EPIC    Reviewed and updated as needed this visit by clinical staff  Tobacco  Allergies  Meds  Med Hx  Surg Hx  Fam Hx  Soc Hx      Reviewed and updated as needed this visit by Provider         ROS:  This 52 year old female is here today to have her blood pressure rechecked. She saw Dr. Almaraz last week for her severe facial pain. He ordered a cat scan of her face. She has worried all weekend that she could have something bad in her head. this has flared up her anxiety.  Thus, her blood pressure is elevated today. She has no facial pain when lying down. As soon as she stands up, she gets terrible pain in her right cheek and she feels a pressure in her right ear. Her cat scan report shows severe right maxillary sinus mucosal thickening. She has recently completed 6 days of Augmentin and 2 weeks of doxycycline and she is still suffering with her facial pain. She is flying to [place] in a month and really needs to feel better by then. She is relieved today to know that she doesn't have anything serious in her head on cat scan. She is eager to visit with Dr. Almaraz about the results. All other review of systems are negative  Personal, family,  "and social history reviewed with patient and revised.         OBJECTIVE:     /70  Pulse 72  Temp 97.8  F (36.6  C) (Oral)  Resp 20  Ht 5' 1\" (1.549 m)  Wt 190 lb 6.4 oz (86.4 kg)  SpO2 98%  BMI 35.98 kg/m2  Body mass index is 35.98 kg/(m^2).  GENERAL: healthy, alert and no distress  NECK: no adenopathy, no asymmetry, masses, or scars and thyroid normal to palpation  RESP: lungs clear to auscultation - no rales, rhonchi or wheezes  CV: regular rate and rhythm, normal S1 S2, no S3 or S4, no murmur, click or rub, no peripheral edema and peripheral pulses strong  ABDOMEN: soft, truncal obesity   MS: no gross musculoskeletal defects noted, no edema    Diagnostic Test Results:  none     ASSESSMENT/PLAN:              1. Benign essential hypertension  Fair control. She will feel better if her sinusitis can be treated.   - carvedilol (COREG CR) 40 MG 24 hr capsule; TAKE ONE CAPSULE BY MOUTH ONCE DAILY  Dispense: 90 capsule; Refill: 3    2. Acute recurrent maxillary sinusitis  As above. She will contact Dr. Almaraz    3. Anxiety  As above, she calmed down after we discussed her report    4. Situational mixed anxiety and depressive disorder  She is on celexa 20 mg and usually does well on it       OK to go to [place] as planned     LUCIANO KIM MD  [facility]  "

## 2018-08-06 NOTE — PATIENT INSTRUCTIONS
[facility]    If you have any questions regarding to your visit please contact your care team:       Team Purple:   Clinic Hours Telephone Number   Dr. Farida Birch   7am-7pm  Monday - Thursday   7am-5pm  Fridays  [phone]  (Appointment scheduling available 24/7)    Questions about your recent visit?   Team Line:  [phone]   Urgent Care - [place] and [facility] - 11am-9pm Monday-Friday Saturday-Sunday- 9am-5pm   [place] - 5pm-9pm Monday-Friday Saturday-Sunday- 9am-5pm  [phone] - [place]  [phone] [facility]       What options do I have for a visit other than an office visit? We offer electronic visits (e-visits) and telephone visits, when medically appropriate.  Please check with your medical insurance to see if these types of visits are covered, as you will be responsible for any charges that are not paid by your insurance.      You can use [employer] (secure electronic communication) to access to your chart, send your primary care provider a message, or make an appointment. Ask a team member how to get started.     For a price quote for your services, please call our Consumer Price Line at [phone] or our Imaging Cost estimation line at [phone] (for imaging tests).

## 2018-08-06 NOTE — MR AVS SNAPSHOT
After Visit Summary   8/6/2018    Juanita Lechuga    MRN: [id number]           Patient Information     Date Of Birth          1965        Visit Information        Provider Department      8/6/2018 10:00 AM Farida Cardona MD [facility]        Today's Diagnoses     Benign essential hypertension    -  1    Acute recurrent maxillary sinusitis        Anxiety        Situational mixed anxiety and depressive disorder          Care Instructions    [facility]    If you have any questions regarding to your visit please contact your care team:       Team Purple:   Clinic Hours Telephone Number   Dr. Farida Birch   7am-7pm  Monday - Thursday   7am-5pm  Fridays  [phone]  (Appointment scheduling available 24/7)    Questions about your recent visit?   Team Line:  [phone]   Urgent Care - [place] and [facility] - 11am-9pm Monday-Friday Saturday-Sunday- 9am-5pm   [place] - 5pm-9pm Monday-Friday Saturday-Sunday- 9am-5pm  [phone] - [place]  [phone] - [place]       What options do I have for a visit other than an office visit? We offer electronic visits (e-visits) and telephone visits, when medically appropriate.  Please check with your medical insurance to see if these types of visits are covered, as you will be responsible for any charges that are not paid by your insurance.      You can use Fanbouts (secure electronic communication) to access to your chart, send your primary care provider a message, or make an appointment. Ask a team member how to get started.     For a price quote for your services, please call our Consumer Price Line at [phone] or our Imaging Cost estimation line at [phone] (for imaging tests).              Follow-ups after your visit        Your next 10 appointments already scheduled     Aug 09, 2018  5:30 PM CDT   Office Visit with Farida Cardona MD  "  [facility]    [address]  [place] [id number] [phone]           Bring a current list of meds and any records pertaining to this visit. For Physicals, please bring immunization records and any forms needing to be filled out. Please arrive 10 minutes early to complete paperwork.              Who to contact     If you have questions or need follow up information about today's clinic visit or your schedule please contact [facility] directly at [phone].  Normal or non-critical lab and imaging results will be communicated to you by Beam [employer], letter or phone within 4 business days after the clinic has received the results. If you do not hear from us within 7 days, please contact the clinic through Neogrowtht or phone. If you have a critical or abnormal lab result, we will notify you by phone as soon as possible.  Submit refill requests through [facility] or call your pharmacy and they will forward the refill request to us. Please allow 3 business days for your refill to be completed.          Additional Information About Your Visit        MyChart Information     [facility] gives you secure access to your electronic health record. If you see a primary care provider, you can also send messages to your care team and make appointments. If you have questions, please call your primary care clinic.  If you do not have a primary care provider, please call [phone] and they will assist you.        Care EveryWhere ID     This is your Care EveryWhere ID. This could be used by other organizations to access your [facility] medical records  [id number]        Your Vitals Were     Pulse Temperature Respirations Height Pulse Oximetry BMI (Body Mass Index)    72 97.8  F (36.6  C) (Oral) 20 5' 1\" (1.549 m) 98% 35.98 kg/m2       Blood Pressure from Last 3 Encounters:   08/06/18 150/70   08/02/18 (!) 184/99   07/27/18 144/86    Weight from Last 3 Encounters:   08/06/18 " 190 lb 6.4 oz (86.4 kg)   08/02/18 190 lb (86.2 kg)   07/27/18 191 lb 8 oz (86.9 kg)              Today, you had the following     No orders found for display         Where to get your medicines      These medications were sent to [id number] - [place], [place] - [address]  [address] [facility] [address] [address]     Phone:  [phone]     carvedilol 40 MG 24 hr capsule          Primary Care Provider Office Phone # Fax #    Farida Cardona [phone] [address] [facility] [address]        Equal Access to Services     [facility] : Hadii dajuan Hoover, waaxda luarianaadaha, qaybta kaalmada adeegyaminnie, loree strong. So [facility] [phone].    ATENCIÓN: Si habla español, tiene a hudson disposición servicios gratuitos de asistencia lingüística. Llame al [phone].    We comply with applicable federal civil rights laws and [place] laws. We do not discriminate on the basis of race, color, national origin, age, disability, sex, sexual orientation, or gender identity.            Thank you!     Thank you for choosing [facility]  for your care. Our goal is always to provide you with excellent care. Hearing back from our patients is one way we can continue to improve our services. Please take a few minutes to complete the written survey that you may receive in the mail after your visit with us. Thank you!             Your Updated Medication List - Protect others around you: Learn how to safely use, store and throw away your medicines at www.disposemymeds.org.          This list is accurate as of 8/6/18 10:13 AM.  Always use your most recent med list.                   Brand Name Dispense Instructions for use Diagnosis    carvedilol 40 MG 24 hr capsule    COREG CR    90 capsule    TAKE ONE CAPSULE BY MOUTH ONCE DAILY    Benign essential hypertension       celeXA 20 MG tablet   Generic drug:  citalopram       Take 20 mg by mouth daily        ibuprofen 800 MG tablet    ADVIL/MOTRIN    90 tablet    Take one 3 X a day with food for sinus pain and pressure    Acute recurrent maxillary sinusitis       losartan-hydrochlorothiazide 100-25 MG per tablet    HYZAAR    90 tablet    Take 1 tablet by mouth daily    Benign essential hypertension       Multi-vitamin Tabs tablet   Generic drug:  multivitamin, therapeutic with minerals      1 TABLET DAILY    Knee pain

## 2018-08-07 ENCOUNTER — TELEPHONE (OUTPATIENT)
Dept: OTOLARYNGOLOGY | Facility: CLINIC | Age: 53
End: 2018-08-07

## 2018-08-07 NOTE — TELEPHONE ENCOUNTER
Type of surgery: image guided endoscopic sinus surgery, endoscopic septoplasty  CPT 34652, 05848  Chronic pansinusitis [J32.4]  - Primary       Deviated nasal septum [J34.2]         Location of surgery: [facility]  Date and time of surgery: 10-9-18  Surgeon: Dr Almaraz  Pre-Op Appt Date: 10-1-18  Post-Op Appt Date: 10-11 and 10-29   Packet sent out: Yes  Pre-cert/Authorization completed:  Call to [facility] of [phone], I spoke to Vic and he gave me instruction that cpt code 47237 has limitations for coverage. I will review  Ref # [id number]  Date: 08/07/2018

## 2018-08-08 ENCOUNTER — MYC MEDICAL ADVICE (OUTPATIENT)
Dept: FAMILY MEDICINE | Facility: CLINIC | Age: 53
End: 2018-08-08

## 2018-08-08 DIAGNOSIS — I10 BENIGN ESSENTIAL HYPERTENSION: Primary | ICD-10-CM

## 2018-08-08 RX ORDER — HYDRALAZINE HYDROCHLORIDE 25 MG/1
TABLET, FILM COATED ORAL
Qty: 180 TABLET | Refills: 3 | Status: SHIPPED | OUTPATIENT
Start: 2018-08-08 | End: 2018-11-27

## 2018-08-08 NOTE — TELEPHONE ENCOUNTER
Call back from patient, she was told by her insurance that after her deductible these procedures will be paid. Now patient is asking for pricing and I referred her to call the business office.     Thank you,   Tarsha Lyle   Referral Department  [phone]

## 2018-08-08 NOTE — TELEPHONE ENCOUNTER
Call from patient, she will call her insurance and call me back with information.     Thank you,   Tarsha Lyle   Referral Department  [phone]

## 2018-08-08 NOTE — TELEPHONE ENCOUNTER
After looking at coverage for septoplasty, I see no pre cert is needed. But this might be just a non covered service. I called home and left message for patient to call me. I will have her contact her insurance to verify.     Thank you,   Tarsah Lyle   Referral Department  [phone]

## 2018-08-21 ENCOUNTER — MYC MEDICAL ADVICE (OUTPATIENT)
Dept: OTOLARYNGOLOGY | Facility: CLINIC | Age: 53
End: 2018-08-21

## 2018-08-29 ENCOUNTER — OFFICE VISIT (OUTPATIENT)
Dept: FAMILY MEDICINE | Facility: CLINIC | Age: 53
End: 2018-08-29
Payer: COMMERCIAL

## 2018-08-29 VITALS
SYSTOLIC BLOOD PRESSURE: 134 MMHG | TEMPERATURE: 97.1 F | DIASTOLIC BLOOD PRESSURE: 76 MMHG | WEIGHT: 193.2 LBS | RESPIRATION RATE: 16 BRPM | HEART RATE: 81 BPM | OXYGEN SATURATION: 97 % | BODY MASS INDEX: 36.5 KG/M2

## 2018-08-29 DIAGNOSIS — I10 BENIGN ESSENTIAL HYPERTENSION: Primary | ICD-10-CM

## 2018-08-29 DIAGNOSIS — E87.6 HYPOKALEMIA: ICD-10-CM

## 2018-08-29 DIAGNOSIS — E66.01 MORBID OBESITY (H): ICD-10-CM

## 2018-08-29 DIAGNOSIS — F41.9 ANXIETY: ICD-10-CM

## 2018-08-29 LAB — POTASSIUM SERPL-SCNC: 3.3 MMOL/L (ref 3.4–5.3)

## 2018-08-29 PROCEDURE — 84132 ASSAY OF SERUM POTASSIUM: CPT | Performed by: FAMILY MEDICINE

## 2018-08-29 PROCEDURE — 99213 OFFICE O/P EST LOW 20 MIN: CPT | Performed by: FAMILY MEDICINE

## 2018-08-29 PROCEDURE — 36415 COLL VENOUS BLD VENIPUNCTURE: CPT | Performed by: FAMILY MEDICINE

## 2018-08-29 RX ORDER — ALPRAZOLAM 0.25 MG
TABLET ORAL
Qty: 10 TABLET | Refills: 0 | Status: SHIPPED | OUTPATIENT
Start: 2018-08-29 | End: 2021-03-02

## 2018-08-29 ASSESSMENT — PAIN SCALES - GENERAL: PAINLEVEL: NO PAIN (0)

## 2018-08-29 NOTE — PROGRESS NOTES
SUBJECTIVE:   Juanita Lechuga is a 52 year old female who presents to clinic today for the following health issues:      Hypertension Follow-up      Outpatient blood pressures are being checked at home.  Results are 145-150/78.    Low Salt Diet: low salt      Amount of exercise or physical activity: 3 days/week for an average of greater than 60 minutes    Problems taking medications regularly: No    Medication side effects: possible Nausea from the Celexa    Diet: low salt, low carb.      Problem list and histories reviewed & adjusted, as indicated.  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     CARDIOVASCULAR SCREENING; LDL GOAL LESS THAN 130     Advanced directives, counseling/discussion     Situational mixed anxiety and depressive disorder     Adjustment disorder with mixed anxiety and depressed mood     Anxiety     Non morbid obesity due to excess calories     Hypertrophy of breast     Facial pressure     Ear fullness, bilateral     Benign essential hypertension     Past Surgical History:   Procedure Laterality Date     BUNIONECTOMY RT/LT  8/7/2008    (L) first toe     BUNIONECTOMY RT/LT  9/20/12    right first toe       Social History   Substance Use Topics     Smoking status: Former Smoker     Packs/day: 0.50     Years: 27.00     Types: Cigarettes     Quit date: 10/24/2011     Smokeless tobacco: Never Used     Alcohol use Yes      Comment: rare     Family History   Problem Relation Age of Onset     C.A.D. Father      Coronary Artery Disease Father      Cancer Maternal Grandmother          Current Outpatient Prescriptions   Medication Sig Dispense Refill     ALPRAZolam (XANAX) 0.25 MG tablet 1-2 q 6 hours as needed 10 tablet 0     carvedilol (COREG CR) 40 MG 24 hr capsule TAKE ONE CAPSULE BY MOUTH ONCE DAILY 90 capsule 3     citalopram (CELEXA) 20 MG tablet Take 20 mg by mouth daily       hydrALAZINE (APRESOLINE) 25 MG tablet  Take one twice a day for high blood pressure 180 tablet 3     ibuprofen (ADVIL/MOTRIN) 800 MG tablet Take one 3 X a day with food for sinus pain and pressure 90 tablet 1     losartan-hydrochlorothiazide (HYZAAR) 100-25 MG per tablet Take 1 tablet by mouth daily 90 tablet 4     citalopram (CELEXA) 20 MG tablet TAKE ONE-HALF TABLET BY MOUTH ONCE DAILY (Patient not taking: Reported on 8/29/2018) 45 tablet 4     MULTI-VITAMIN OR TABS 1 TABLET DAILY       traMADol (ULTRAM) 50 MG tablet Take 1 tablet (50 mg) by mouth every 6 hours as needed for severe pain 50 tablet 0     Allergies   Allergen Reactions     Lisinopril Cough     Recent Labs   Lab Test  03/23/18   0859  10/05/17   1719  02/24/17   1055  01/18/16   1057   LDL  124*   --   115*  113*   HDL  62   --   80  72   TRIG  61   --   71  62   ALT  30   --    --    --    CR  0.45*   --   0.48*  0.59   GFRESTIMATED  >90   --   >90  Non  GFR Calc    >90  Non  GFR Calc     GFRESTBLACK  >90   --   >90   GFR Calc    >90   GFR Calc     POTASSIUM  4.0   --   3.9  4.3   TSH   --   2.90  1.84  2.71      BP Readings from Last 3 Encounters:   08/29/18 134/76   08/06/18 150/70   08/02/18 (!) 184/99    Wt Readings from Last 3 Encounters:   08/29/18 193 lb 3.2 oz (87.6 kg)   08/06/18 190 lb 6.4 oz (86.4 kg)   08/02/18 190 lb (86.2 kg)                  Labs reviewed in EPIC    Reviewed and updated as needed this visit by clinical staff  Tobacco  Allergies  Meds       Reviewed and updated as needed this visit by Provider         ROS:  CONSTITUTIONAL: NEGATIVE for fever, chills, change in weight  ENT/MOUTH: NEGATIVE for ear, mouth and throat problems  RESP: NEGATIVE for significant cough or SOB  CV: NEGATIVE for chest pain, palpitations or peripheral edema  GI: NEGATIVE for nausea, abdominal pain, heartburn, or change in bowel habits    OBJECTIVE:     /76  Pulse 81  Temp 97.1  F (36.2  C) (Oral)  Resp 16  Wt  193 lb 3.2 oz (87.6 kg)  SpO2 97%  BMI 36.5 kg/m2  Body mass index is 36.5 kg/(m^2).  GENERAL: healthy, alert and no distress  NECK: no adenopathy, no asymmetry, masses, or scars and thyroid normal to palpation  RESP: lungs clear to auscultation - no rales, rhonchi or wheezes  CV: regular rate and rhythm, normal S1 S2, no S3 or S4, no murmur, click or rub, no peripheral edema and peripheral pulses strong  ABDOMEN: soft, nontender, no hepatosplenomegaly, no masses and bowel sounds normal  MS: no gross musculoskeletal defects noted, no edema    Diagnostic Test Results:  none     ASSESSMENT/PLAN:       1. Benign essential hypertension  controlled  - Potassium    2. Anxiety  Pt is [place]elling  [place]verse[place] and wants some something for anxiety  Her anxiety is overall doing better on celexa  - ALPRAZolam (XANAX) 0.25 MG tablet; 1-2 q 6 hours as needed  Dispense: 10 tablet; Refill: 0  She can take 1-2 xanax tablet on plane if anxious  Do not drink alcohol with this  Mable Cardoso MD  [facility]

## 2018-08-29 NOTE — MR AVS SNAPSHOT
After Visit Summary   8/29/2018    Juanita Lechuga    MRN: [id number]           Patient Information     Date Of Birth          1965        Visit Information        Provider Department      8/29/2018 12:00 PM [facility]        Today's Diagnoses     Benign essential hypertension    -  1    Anxiety          Care Instructions    [facility]    If you have any questions regarding to your visit please contact your care team:       Team Red:   Clinic Hours Telephone Number   Dr. Asmita Monaco NP   7am-7pm  Monday - Thursday   7am-5pm  Fridays  [phone]  (Appointment scheduling available 24/7)    Questions about your recent visit?   Team Line  [phone]   Urgent Care - [place] and [facility] - 11am-9pm Monday-Friday Saturday-Sunday- 9am-5pm   [place] - 5pm-9pm Monday-Friday Saturday-Sunday- 9am-5pm  [phone] - [place]  [phone] - [place]       What options do I have for a visit other than an office visit? We offer electronic visits (e-visits) and telephone visits, when medically appropriate.  Please check with your medical insurance to see if these types of visits are covered, as you will be responsible for any charges that are not paid by your insurance.      You can use Md7 (secure electronic communication) to access to your chart, send your primary care provider a message, or make an appointment. Ask a team member how to get started.     For a price quote for your services, please call our Consumer Price Line at [phone] or our Imaging Cost estimation line at [phone] (for imaging tests).    Discharged by Tawny Marroquin MA.            Follow-ups after your visit        Your next 10 appointments already scheduled     Aug 31, 2018 11:30 AM CDT   Return Visit with Micky Almaraz MD   [facility]    [address] [place] [address]  [facility]  [place] [address]   [phone]            Oct 01, 2018 10:00 AM CDT   Pre-Op physical with Farida Cardona MD   [facility]    [address]  [place] [address]   [phone]            Oct 09, 2018   Procedure with Micky Almaraz MD   [facility] [place] (--)    [address] [place] [address]   [phone]            Oct 11, 2018  4:30 PM CDT   Post Op with Micky Almaraz MD   [facility]    [address] [place] [address]   [phone]            Oct 29, 2018  5:30 PM CDT   Post Op with Micky Almaraz MD   [facility]    [address] [place] [address]   [phone]              Who to contact     If you have questions or need follow up information about today's clinic visit or your schedule please contact [facility] directly at [phone].  Normal or non-critical lab and imaging results will be communicated to you by S[employer], letter or phone within 4 business days after the clinic has received the results. If you do not hear from us within 7 days, please contact the clinic through S[employer] or phone. If you have a critical or abnormal lab result, we will notify you by phone as soon as possible.  Submit refill requests through [employer] or call your pharmacy and they will forward the refill request to us. Please allow 3 business days for your refill to be completed.          Additional Information About Your Visit        [employer] Information     [employer] gives you secure access to your electronic health record. If you see a primary care provider, you can also send messages to your care team and make appointments. If you have questions, please call your primary care clinic.  If you do not have a primary care provider, please call [phone] and they will assist you.        Care  EveryWhere ID     This is your Care EveryWhere ID. This could be used by other organizations to access your [facility] medical records  [id number]        Your Vitals Were     Pulse Temperature Respirations Pulse Oximetry BMI (Body Mass Index)       81 97.1  F (36.2  C) (Oral) 16 97% 36.5 kg/m2        Blood Pressure from Last 3 Encounters:   08/29/18 134/76   08/06/18 150/70   08/02/18 (!) 184/99    Weight from Last 3 Encounters:   08/29/18 193 lb 3.2 oz (87.6 kg)   08/06/18 190 lb 6.4 oz (86.4 kg)   08/02/18 190 lb (86.2 kg)              We Performed the Following     Potassium          Today's Medication Changes          These changes are accurate as of 8/29/18 12:38 PM.  If you have any questions, ask your nurse or doctor.               Start taking these medicines.        Dose/Directions    ALPRAZolam 0.25 MG tablet   Commonly known as:  XANAX   Used for:  Anxiety   Started by:  Mable Cardoso MD        1-2 q 6 hours as needed   Quantity:  10 tablet   Refills:  0            Where to get your medicines      Some of these will need a paper prescription and others can be bought over the counter.  Ask your nurse if you have questions.     Bring a paper prescription for each of these medications     ALPRAZolam 0.25 MG tablet                Primary Care Provider Office Phone # Fax #    Farida Cardona [phone]       [address] [facility] [address]        Equal Access to Services     OLI CASTELLANO AH: Hadkarla Hoover, waaxda allyn, qaybta kaalloree shipley. So [facility] [phone].    ATENCIÓN: Si habla español, tiene a hudson disposición servicios gratuitos de asistencia lingüística. Llame al [phone].    We comply with applicable federal civil rights laws and [place] laws. We do not discriminate on the basis of race, color, national origin, age, disability, sex, sexual orientation, or gender identity.            Thank you!      Thank you for choosing [facility]  for your care. Our goal is always to provide you with excellent care. Hearing back from our patients is one way we can continue to improve our services. Please take a few minutes to complete the written survey that you may receive in the mail after your visit with us. Thank you!             Your Updated Medication List - Protect others around you: Learn how to safely use, store and throw away your medicines at www.disposemymeds.org.          This list is accurate as of 8/29/18 12:38 PM.  Always use your most recent med list.                   Brand Name Dispense Instructions for use Diagnosis    ALPRAZolam 0.25 MG tablet    XANAX    10 tablet    1-2 q 6 hours as needed    Anxiety       carvedilol 40 MG 24 hr capsule    COREG CR    90 capsule    TAKE ONE CAPSULE BY MOUTH ONCE DAILY    Benign essential hypertension       * celeXA 20 MG tablet   Generic drug:  citalopram      Take 20 mg by mouth daily        * citalopram 20 MG tablet    celeXA    45 tablet    TAKE ONE-HALF TABLET BY MOUTH ONCE DAILY    Situational mixed anxiety and depressive disorder, Anxiety       hydrALAZINE 25 MG tablet    APRESOLINE    180 tablet    Take one twice a day for high blood pressure    Benign essential hypertension       ibuprofen 800 MG tablet    ADVIL/MOTRIN    90 tablet    Take one 3 X a day with food for sinus pain and pressure    Acute recurrent maxillary sinusitis       losartan-hydrochlorothiazide 100-25 MG per tablet    HYZAAR    90 tablet    Take 1 tablet by mouth daily    Benign essential hypertension       Multi-vitamin Tabs tablet   Generic drug:  multivitamin, therapeutic with minerals      1 TABLET DAILY    Knee pain       traMADol 50 MG tablet    ULTRAM    50 tablet    Take 1 tablet (50 mg) by mouth every 6 hours as needed for severe pain    Chronic pansinusitis, Deviated nasal septum       * Notice:  This list has 2 medication(s) that are the same as other medications  prescribed for you. Read the directions carefully, and ask your doctor or other care provider to review them with you.

## 2018-08-29 NOTE — PATIENT INSTRUCTIONS
[facility]    If you have any questions regarding to your visit please contact your care team:       Team Red:   Clinic Hours Telephone Number   Dr. Asmita Monaco, NP   7am-7pm  Monday - Thursday   7am-5pm  Fridays  [phone]  (Appointment scheduling available 24/7)    Questions about your recent visit?   Team Line  [phone]   Urgent Care - [place] and [facility] - 11am-9pm Monday-Friday Saturday-Sunday- 9am-5pm   [place] - 5pm-9pm Monday-Friday Saturday-Sunday- 9am-5pm  [phone] - [place]  [phone] - [place]       What options do I have for a visit other than an office visit? We offer electronic visits (e-visits) and telephone visits, when medically appropriate.  Please check with your medical insurance to see if these types of visits are covered, as you will be responsible for any charges that are not paid by your insurance.      You can use [employer] (secure electronic communication) to access to your chart, send your primary care provider a message, or make an appointment. Ask a team member how to get started.     For a price quote for your services, please call our Consumer Price Line at [phone] or our Imaging Cost estimation line at [phone] (for imaging tests).    Discharged by Tawny Marroquin MA.

## 2018-08-30 ENCOUNTER — TELEPHONE (OUTPATIENT)
Dept: FAMILY MEDICINE | Facility: CLINIC | Age: 53
End: 2018-08-30

## 2018-08-30 ENCOUNTER — TRANSFERRED RECORDS (OUTPATIENT)
Dept: HEALTH INFORMATION MANAGEMENT | Facility: CLINIC | Age: 53
End: 2018-08-30

## 2018-08-30 ENCOUNTER — MYC MEDICAL ADVICE (OUTPATIENT)
Dept: FAMILY MEDICINE | Facility: CLINIC | Age: 53
End: 2018-08-30

## 2018-08-30 DIAGNOSIS — E87.6 LOW BLOOD POTASSIUM: Primary | ICD-10-CM

## 2018-08-30 PROBLEM — E66.01 MORBID OBESITY (H): Status: ACTIVE | Noted: 2018-08-30

## 2018-08-30 RX ORDER — POTASSIUM CHLORIDE 750 MG/1
10 TABLET, EXTENDED RELEASE ORAL DAILY
Qty: 30 TABLET | Refills: 1 | Status: SHIPPED | OUTPATIENT
Start: 2018-08-30 | End: 2018-10-01

## 2018-08-30 NOTE — TELEPHONE ENCOUNTER
Left message on answering machine for patient to call back to the RN hotline at [phone].    Also sent [employer] message with results.    Please send high potassium food list to pt.    Misty Villalobos RN  [facility]

## 2018-08-30 NOTE — TELEPHONE ENCOUNTER
Mable Cardoso MD  P [facility]z Rn Triage Pool                   Potassium is Low   Please send List of High Potassium foods   Take K-dur 10 meq daily   Follow up Potassium check 10 days

## 2018-08-31 ENCOUNTER — OFFICE VISIT (OUTPATIENT)
Dept: OTOLARYNGOLOGY | Facility: CLINIC | Age: 53
End: 2018-08-31
Payer: COMMERCIAL

## 2018-08-31 VITALS
BODY MASS INDEX: 36.44 KG/M2 | SYSTOLIC BLOOD PRESSURE: 139 MMHG | RESPIRATION RATE: 12 BRPM | HEART RATE: 88 BPM | DIASTOLIC BLOOD PRESSURE: 77 MMHG | OXYGEN SATURATION: 97 % | HEIGHT: 61 IN | WEIGHT: 193 LBS

## 2018-08-31 DIAGNOSIS — J32.4 CHRONIC PANSINUSITIS: Primary | ICD-10-CM

## 2018-08-31 DIAGNOSIS — H93.8X3 EAR FULLNESS, BILATERAL: ICD-10-CM

## 2018-08-31 PROCEDURE — 99214 OFFICE O/P EST MOD 30 MIN: CPT | Performed by: OTOLARYNGOLOGY

## 2018-08-31 NOTE — PATIENT INSTRUCTIONS
Scheduling Information  To schedule your CT/MRI scan, please contact [facility] at [phone] OR [facility] at [phone]    To schedule your Surgery, please contact our Specialty Schedulers at [phone]      ENT Clinic Locations Clinic Hours Telephone Number     Callie Lux  [address] [place] [address]   Monday:           1:00pm -- 5:00pm    Friday:              8:00am - 12:00pm   To schedule/reschedule an appointment with   Dr. Almaraz,   please contact our   Specialty Scheduling Department at:     [phone]       [facility]ie [facility]  [address] [place] [address] Tuesday:          8:00am -- 2:00pm         Urgent Care Locations Clinic Hours Telephone Numbers     Callie Melchor  [address] [place] [address]     Monday-Friday:     11:00am - 9:00pm    Saturday-Sunday:  9:00am - 5:00pm   [phone]     [facility]  [address] [place] [address]     Monday-Friday:      5:00pm - 9:00pm     Saturday-Sunday:  9:00am - 5:00pm   [phone]

## 2018-08-31 NOTE — TELEPHONE ENCOUNTER
Patient called back.  She stated that she did get the results via [employer] regarding her low potassium, however, she will be out of the country starting 9/5/18 and will be   She is unable to come in for a recheck potassium in 10 days  She has an appointment with Dr. Cardona on 10/1/18  She stated that the recheck will need to wait until she is back in the country    She wrote a message to Dr. Cardoso on [employer]- see below  She asked that we write back asap with provider's response  (DR. Cardoso is out of the office today and will be back 9/4/18. Patient leaving 9/5/18)    Fran Chang RN

## 2018-08-31 NOTE — PROGRESS NOTES
History of Present Illness - Juanita Lechuga is a 52 year old female here in follow up from 8/2/2018.    To review, This started about 4-5 weeks prior to the first visit, when she started to get pressure behind and around the eyes.  Also, she felt pressure and a deep ache in the ears.  She ended up going to the ER, and EKG, CT scan of the head, and labs, and all was normal.  She was diagnosed with a sinusitis, and was treated with antibiotics.  It didn't help, and at follow up with PCP and she was placed on Doxycycline for two weeks.  It was only marginally helpful, but there is still pressure behind the eyes.  Also, she notes that she has become sensitive to bright light as well.    She tells me that her vision was just recently checked and normal.  Also, she has no history of any ENT disease or surgery.  She is not sure if she clenches her teeth.  No dental issues.  Of note, she had no congestion or post nasal drainage when this was happening.    Ct scan was done that definitively showed chronic pansinusitis.  Therefore we opted to try maximal medical management.  She contacted me that there was really no progress, so we have discussed and planned for functional endoscopic sinus surgery.    Past Medical History -   Patient Active Problem List   Diagnosis     Old disruption of anterior cruciate ligament     CARDIOVASCULAR SCREENING; LDL GOAL LESS THAN 130     Advanced directives, counseling/discussion     Situational mixed anxiety and depressive disorder     Adjustment disorder with mixed anxiety and depressed mood     Anxiety     Non morbid obesity due to excess calories     Hypertrophy of breast     Facial pressure     Ear fullness, bilateral     Benign essential hypertension     Morbid obesity (H)       Current Medications -   Current Outpatient Prescriptions:      ALPRAZolam (XANAX) 0.25 MG tablet, 1-2 q 6 hours as needed, Disp: 10 tablet, Rfl: 0     carvedilol (COREG CR) 40 MG 24 hr capsule, TAKE ONE CAPSULE BY  MOUTH ONCE DAILY, Disp: 90 capsule, Rfl: 3     citalopram (CELEXA) 20 MG tablet, TAKE ONE-HALF TABLET BY MOUTH ONCE DAILY (Patient not taking: Reported on 8/29/2018), Disp: 45 tablet, Rfl: 4     citalopram (CELEXA) 20 MG tablet, Take 20 mg by mouth daily, Disp: , Rfl:      hydrALAZINE (APRESOLINE) 25 MG tablet, Take one twice a day for high blood pressure, Disp: 180 tablet, Rfl: 3     ibuprofen (ADVIL/MOTRIN) 800 MG tablet, Take one 3 X a day with food for sinus pain and pressure, Disp: 90 tablet, Rfl: 1     losartan-hydrochlorothiazide (HYZAAR) 100-25 MG per tablet, Take 1 tablet by mouth daily, Disp: 90 tablet, Rfl: 4     MULTI-VITAMIN OR TABS, 1 TABLET DAILY, Disp: , Rfl:      potassium chloride SA (K-DUR/KLOR-CON M) 10 MEQ CR tablet, Take 1 tablet (10 mEq) by mouth daily, Disp: 30 tablet, Rfl: 1     traMADol (ULTRAM) 50 MG tablet, Take 1 tablet (50 mg) by mouth every 6 hours as needed for severe pain, Disp: 50 tablet, Rfl: 0    Allergies -   Allergies   Allergen Reactions     Lisinopril Cough       Social History -   Social History     Social History     Marital status:      Spouse name: N/A     Number of children: 1     Years of education: N/A     Occupational History      [employer]     Social History Main Topics     Smoking status: Former Smoker     Packs/day: 0.50     Years: 27.00     Types: Cigarettes     Quit date: 10/24/2011     Smokeless tobacco: Never Used     Alcohol use Yes      Comment: rare     Drug use: No     Sexual activity: Yes     Partners: Male     Birth control/ protection: Condom     Other Topics Concern     Parent/Sibling W/ Cabg, Mi Or Angioplasty Before 65f 55m? Yes     Social History Narrative       Family History -   Family History   Problem Relation Age of Onset     C.A.D. Father      Coronary Artery Disease Father      Cancer Maternal Grandmother        Review of Systems - As per HPI and PMHx, otherwise 10+ system review of the head and neck, and general constitution is  "negative.    Physical Exam  B/P: 139/77, T: Data Unavailable, P: 88, R: 12  Vitals: /77  Pulse 88  Resp 12  Ht 1.549 m (5' 1\")  Wt 87.5 kg (193 lb)  SpO2 97%  BMI 36.47 kg/m2  BMI= Body mass index is 36.47 kg/(m^2).    General - The patient is well nourished and well developed, and appears to have good nutritional status.  Alert and oriented to person and place, answers questions and cooperates with examination appropriately.   Head and Face - Normocephalic and atraumatic, with no gross asymmetry noted of the contour of the facial features.  The facial nerve is intact, with strong symmetric movements.  Voice and Breathing - The patient was breathing comfortably without the use of accessory muscles. There was no wheezing, stridor, or stertor.  The patients voice was clear and strong, and had appropriate pitch and quality.  Ears - The tympanic membranes are normal in appearance, bony landmarks are intact.  No retraction, perforation, or masses.  No fluid or purulence was seen in the external canal or the middle ear. No evidence of infection of the middle ear or external canal, cerumen was normal in appearance.  Eyes - Extraocular movements intact, and the pupils were reactive to light.  Sclera were not icteric or injected, conjunctiva were pink and moist.  Mouth - Examination of the oral cavity showed pink, healthy oral mucosa. No lesions or ulcerations noted.  The tongue was mobile and midline, and the dentition were in good condition.    Throat - The walls of the oropharynx were smooth, pink, moist, symmetric, and had no lesions or ulcerations.  The tonsillar pillars and soft palate were symmetric.  The uvula was midline on elevation.    Neck - Normal midline excursion of the laryngotracheal complex during swallowing.  Full range of motion on passive movement.  Palpation of the occipital, submental, submandibular, internal jugular chain, and supraclavicular nodes did not demonstrate any abnormal lymph " nodes or masses.  The carotid pulse was palpable bilaterally.  Palpation of the thyroid was soft and smooth, with no nodules or goiter appreciated.  The trachea was mobile and midline.  Nose - External contour is symmetric, no gross deflection or scars.  Nasal mucosa is pink and moist with no abnormal mucus.  The septum was midline and non-obstructive, turbinates of normal size and position.  No polyps, masses, or purulence noted on examination.      A/P - Juanita Lechuga is a 52 year old female  (J32.4) Chronic pansinusitis  (primary encounter diagnosis)  (H93.8X3) Ear fullness, bilateral    Based on the failure of medical management which has included multiple courses of antibiotics, antihistamines, nasal steroid sprays, saline lavage, and decongestants, my recommendation is for functional endoscopic sinus surgery.    The basic premise of functional endoscopic sinus surgery was discussed at length.  This included the concept behind restoration of the natural drainage pathways for the paranasal sinuses using the latest minimally invasive, minimally traumatic techniques and instruments.    Risks discussed included the risks on infection, bleeding, the risks of general anesthesia.  Also specific to functional endoscopic sinus surgery, the risks of permanent damage to the eyes/vision, tear ducts, sense of smell, base of skull/brain, and CSF leak were described.  And finally, the possibility that functional endoscopic sinus surgery may not relieve sinus disease, and that there might be continued need for medical management was also discussed.  The patient understood and wished to proceed with scheduling.

## 2018-08-31 NOTE — LETTER
8/31/2018         RE: Juanita Lechuga  [address]  [place] [address]        Dear Colleague,    Thank you for referring your patient, Juanita Lechuga, to the [facility]. Please see a copy of my visit note below.    History of Present Illness - Juanita Lechuga is a 52 year old female here in follow up from 8/2/2018.    To review, This started about 4-5 weeks prior to the first visit, when she started to get pressure behind and around the eyes.  Also, she felt pressure and a deep ache in the ears.  She ended up going to the ER, and EKG, CT scan of the head, and labs, and all was normal.  She was diagnosed with a sinusitis, and was treated with antibiotics.  It didn't help, and at follow up with PCP and she was placed on Doxycycline for two weeks.  It was only marginally helpful, but there is still pressure behind the eyes.  Also, she notes that she has become sensitive to bright light as well.    She tells me that her vision was just recently checked and normal.  Also, she has no history of any ENT disease or surgery.  She is not sure if she clenches her teeth.  No dental issues.  Of note, she had no congestion or post nasal drainage when this was happening.    Ct scan was done that definitively showed chronic pansinusitis.  Therefore we opted to try maximal medical management.  She contacted me that there was really no progress, so we have discussed and planned for functional endoscopic sinus surgery.    Past Medical History -   Patient Active Problem List   Diagnosis     Old disruption of anterior cruciate ligament     CARDIOVASCULAR SCREENING; LDL GOAL LESS THAN 130     Advanced directives, counseling/discussion     Situational mixed anxiety and depressive disorder     Adjustment disorder with mixed anxiety and depressed mood     Anxiety     Non morbid obesity due to excess calories     Hypertrophy of breast     Facial pressure     Ear fullness, bilateral     Benign essential hypertension      Morbid obesity (H)       Current Medications -   Current Outpatient Prescriptions:      ALPRAZolam (XANAX) 0.25 MG tablet, 1-2 q 6 hours as needed, Disp: 10 tablet, Rfl: 0     carvedilol (COREG CR) 40 MG 24 hr capsule, TAKE ONE CAPSULE BY MOUTH ONCE DAILY, Disp: 90 capsule, Rfl: 3     citalopram (CELEXA) 20 MG tablet, TAKE ONE-HALF TABLET BY MOUTH ONCE DAILY (Patient not taking: Reported on 8/29/2018), Disp: 45 tablet, Rfl: 4     citalopram (CELEXA) 20 MG tablet, Take 20 mg by mouth daily, Disp: , Rfl:      hydrALAZINE (APRESOLINE) 25 MG tablet, Take one twice a day for high blood pressure, Disp: 180 tablet, Rfl: 3     ibuprofen (ADVIL/MOTRIN) 800 MG tablet, Take one 3 X a day with food for sinus pain and pressure, Disp: 90 tablet, Rfl: 1     losartan-hydrochlorothiazide (HYZAAR) 100-25 MG per tablet, Take 1 tablet by mouth daily, Disp: 90 tablet, Rfl: 4     MULTI-VITAMIN OR TABS, 1 TABLET DAILY, Disp: , Rfl:      potassium chloride SA (K-DUR/KLOR-CON M) 10 MEQ CR tablet, Take 1 tablet (10 mEq) by mouth daily, Disp: 30 tablet, Rfl: 1     traMADol (ULTRAM) 50 MG tablet, Take 1 tablet (50 mg) by mouth every 6 hours as needed for severe pain, Disp: 50 tablet, Rfl: 0    Allergies -   Allergies   Allergen Reactions     Lisinopril Cough       Social History -   Social History     Social History     Marital status:      Spouse name: N/A     Number of children: 1     Years of education: N/A     Occupational History      [employer]     Social History Main Topics     Smoking status: Former Smoker     Packs/day: 0.50     Years: 27.00     Types: Cigarettes     Quit date: 10/24/2011     Smokeless tobacco: Never Used     Alcohol use Yes      Comment: rare     Drug use: No     Sexual activity: Yes     Partners: Male     Birth control/ protection: Condom     Other Topics Concern     Parent/Sibling W/ Cabg, Mi Or Angioplasty Before 65f 55m? Yes     Social History Narrative       Family History -   Family History   Problem  "Relation Age of Onset     C.A.D. Father      Coronary Artery Disease Father      Cancer Maternal Grandmother        Review of Systems - As per HPI and PMHx, otherwise 10+ system review of the head and neck, and general constitution is negative.    Physical Exam  B/P: 139/77, T: Data Unavailable, P: 88, R: 12  Vitals: /77  Pulse 88  Resp 12  Ht 1.549 m (5' 1\")  Wt 87.5 kg (193 lb)  SpO2 97%  BMI 36.47 kg/m2  BMI= Body mass index is 36.47 kg/(m^2).    General - The patient is well nourished and well developed, and appears to have good nutritional status.  Alert and oriented to person and place, answers questions and cooperates with examination appropriately.   Head and Face - Normocephalic and atraumatic, with no gross asymmetry noted of the contour of the facial features.  The facial nerve is intact, with strong symmetric movements.  Voice and Breathing - The patient was breathing comfortably without the use of accessory muscles. There was no wheezing, stridor, or stertor.  The patients voice was clear and strong, and had appropriate pitch and quality.  Ears - The tympanic membranes are normal in appearance, bony landmarks are intact.  No retraction, perforation, or masses.  No fluid or purulence was seen in the external canal or the middle ear. No evidence of infection of the middle ear or external canal, cerumen was normal in appearance.  Eyes - Extraocular movements intact, and the pupils were reactive to light.  Sclera were not icteric or injected, conjunctiva were pink and moist.  Mouth - Examination of the oral cavity showed pink, healthy oral mucosa. No lesions or ulcerations noted.  The tongue was mobile and midline, and the dentition were in good condition.    Throat - The walls of the oropharynx were smooth, pink, moist, symmetric, and had no lesions or ulcerations.  The tonsillar pillars and soft palate were symmetric.  The uvula was midline on elevation.    Neck - Normal midline excursion of " the laryngotracheal complex during swallowing.  Full range of motion on passive movement.  Palpation of the occipital, submental, submandibular, internal jugular chain, and supraclavicular nodes did not demonstrate any abnormal lymph nodes or masses.  The carotid pulse was palpable bilaterally.  Palpation of the thyroid was soft and smooth, with no nodules or goiter appreciated.  The trachea was mobile and midline.  Nose - External contour is symmetric, no gross deflection or scars.  Nasal mucosa is pink and moist with no abnormal mucus.  The septum was midline and non-obstructive, turbinates of normal size and position.  No polyps, masses, or purulence noted on examination.      A/P - Juanita Lechuga is a 52 year old female  (J32.4) Chronic pansinusitis  (primary encounter diagnosis)  (H93.8X3) Ear fullness, bilateral    Based on the failure of medical management which has included multiple courses of antibiotics, antihistamines, nasal steroid sprays, saline lavage, and decongestants, my recommendation is for functional endoscopic sinus surgery.    The basic premise of functional endoscopic sinus surgery was discussed at length.  This included the concept behind restoration of the natural drainage pathways for the paranasal sinuses using the latest minimally invasive, minimally traumatic techniques and instruments.    Risks discussed included the risks on infection, bleeding, the risks of general anesthesia.  Also specific to functional endoscopic sinus surgery, the risks of permanent damage to the eyes/vision, tear ducts, sense of smell, base of skull/brain, and CSF leak were described.  And finally, the possibility that functional endoscopic sinus surgery may not relieve sinus disease, and that there might be continued need for medical management was also discussed.  The patient understood and wished to proceed with scheduling.      Again, thank you for allowing me to participate in the care of your patient.         Sincerely,        Micky Almaraz MD

## 2018-08-31 NOTE — MR AVS SNAPSHOT
After Visit Summary   8/31/2018    Juanita Lechuga    MRN: [id number]           Patient Information     Date Of Birth          1965        Visit Information        Provider Department      8/31/2018 11:30 AM Micky Almaraz MD [facility]        Today's Diagnoses     Chronic pansinusitis    -  1    Ear fullness, bilateral          Care Instructions    Scheduling Information  To schedule your CT/MRI scan, please contact [facility] at [phone] OR [facility] at [phone]    To schedule your Surgery, please contact our Specialty Schedulers at [phone]      ENT Clinic Locations Clinic Hours Telephone Number     [facility]  [address]  [place] [address]   Monday:           1:00pm -- 5:00pm    Friday:              8:00am - 12:00pm   To schedule/reschedule an appointment with   Dr. Almaraz,   please contact our   Specialty Scheduling Department at:     [phone]       [facility]  [address]  [place] [address] Tuesday:          8:00am -- 2:00pm         Urgent Care Locations Clinic Hours Telephone Numbers     [facility]  [address]  [place] [address]     Monday-Friday:     11:00am - 9:00pm    Saturday-Sunday:  9:00am - 5:00pm   [phone]     [facility]  [address] [place] [address]     Monday-Friday:      5:00pm - 9:00pm     Saturday-Sunday:  9:00am - 5:00pm   [phone]                 Follow-ups after your visit        Your next 10 appointments already scheduled     Oct 01, 2018 10:00 AM CDT   Pre-Op physical with Farida Cardona MD   [facility]    [address] [id number] [phone]            Oct 09, 2018   Procedure with Micky Almaraz MD   [facility] (--)    [address] [place] [address]   [phone]            Oct 11, 2018  4:30 PM CDT   Post Op with Micky  "Farooq Almaraz MD   [facility]    [address] [place] [address]  [place] [id number] [phone]            Oct 29, 2018  5:30 PM CDT   Post Op with Micky Almaraz MD   [facility] ([facility])    [address] [place] [address]  [place] [address]   [phone]              Who to contact     If you have questions or need follow up information about today's clinic visit or your schedule please contact [facility] directly at [phone].  Normal or non-critical lab and imaging results will be communicated to you by MyChart, letter or phone within 4 business days after the clinic has received the results. If you do not hear from us within 7 days, please contact the clinic through [employer] or phone. If you have a critical or abnormal lab result, we will notify you by phone as soon as possible.  Submit refill requests through [employer] or call your pharmacy and they will forward the refill request to us. Please allow 3 business days for your refill to be completed.          Additional Information About Your Visit        [employer] Information     [employer] gives you secure access to your electronic health record. If you see a primary care provider, you can also send messages to your care team and make appointments. If you have questions, please call your primary care clinic.  If you do not have a primary care provider, please call [phone] and they will assist you.        Care EveryWhere ID     This is your Care EveryWhere ID. This could be used by other organizations to access your [facility] medical records  [id number]        Your Vitals Were     Pulse Respirations Height Pulse Oximetry BMI (Body Mass Index)       88 12 1.549 m (5' 1\") 97% 36.47 kg/m2        Blood Pressure from Last 3 Encounters:   08/31/18 139/77   08/29/18 134/76   08/06/18 150/70    Weight from Last 3 Encounters:   08/31/18 87.5 kg (193 lb)   08/29/18 87.6 kg (193 " lb 3.2 oz)   08/06/18 86.4 kg (190 lb 6.4 oz)              Today, you had the following     No orders found for display       Primary Care Provider Office Phone # Fax #    Farida Cardona [phone] [address] [address]        Equal Access to Services     [facility]: Hadii aad ku hadasho Soomaali, waaxda luqadaha, qaybta kaalmada adeegyada, waxay idiin hayaan adeeg kharash la'aan . So [facility] [phone].    ATENCIÓN: Si habla español, tiene a hudson disposición servicios gratuitos de asistencia lingüística. Llame al [phone].    We comply with applicable federal civil rights laws and [place] laws. We do not discriminate on the basis of race, color, national origin, age, disability, sex, sexual orientation, or gender identity.            Thank you!     Thank you for choosing [facility]  for your care. Our goal is always to provide you with excellent care. Hearing back from our patients is one way we can continue to improve our services. Please take a few minutes to complete the written survey that you may receive in the mail after your visit with us. Thank you!             Your Updated Medication List - Protect others around you: Learn how to safely use, store and throw away your medicines at www.disposemymeds.org.          This list is accurate as of 8/31/18 11:57 AM.  Always use your most recent med list.                   Brand Name Dispense Instructions for use Diagnosis    ALPRAZolam 0.25 MG tablet    XANAX    10 tablet    1-2 q 6 hours as needed    Anxiety       carvedilol 40 MG 24 hr capsule    COREG CR    90 capsule    TAKE ONE CAPSULE BY MOUTH ONCE DAILY    Benign essential hypertension       * celeXA 20 MG tablet   Generic drug:  citalopram      Take 20 mg by mouth daily        * citalopram 20 MG tablet    celeXA    45 tablet    TAKE ONE-HALF TABLET BY MOUTH ONCE DAILY    Situational mixed anxiety and depressive disorder, Anxiety        hydrALAZINE 25 MG tablet    APRESOLINE    180 tablet    Take one twice a day for high blood pressure    Benign essential hypertension       ibuprofen 800 MG tablet    ADVIL/MOTRIN    90 tablet    Take one 3 X a day with food for sinus pain and pressure    Acute recurrent maxillary sinusitis       losartan-hydrochlorothiazide 100-25 MG per tablet    HYZAAR    90 tablet    Take 1 tablet by mouth daily    Benign essential hypertension       Multi-vitamin Tabs tablet   Generic drug:  multivitamin, therapeutic with minerals      1 TABLET DAILY    Knee pain       potassium chloride SA 10 MEQ CR tablet    K-DUR/KLOR-CON M    30 tablet    Take 1 tablet (10 mEq) by mouth daily    Hypokalemia       traMADol 50 MG tablet    ULTRAM    50 tablet    Take 1 tablet (50 mg) by mouth every 6 hours as needed for severe pain    Chronic pansinusitis, Deviated nasal septum       * Notice:  This list has 2 medication(s) that are the same as other medications prescribed for you. Read the directions carefully, and ask your doctor or other care provider to review them with you.

## 2018-08-31 NOTE — TELEPHONE ENCOUNTER
Patient called back.  She stated that she did get the results via [employer], however, she will be out of the country starting 9[place]5/18 and will be gone for 3 weeks.  She is unable to come in for a recheck in 10 days  She wrote a message to Dr. Cardoso on [employer]  She asked that we write back asap as she is leaving next week    See [employer] encounter 8/30/18      Fran Chang RN

## 2018-09-04 ENCOUNTER — MYC MEDICAL ADVICE (OUTPATIENT)
Dept: FAMILY MEDICINE | Facility: CLINIC | Age: 53
End: 2018-09-04

## 2018-09-04 DIAGNOSIS — E87.6 LOW BLOOD POTASSIUM: ICD-10-CM

## 2018-09-04 LAB — POTASSIUM SERPL-SCNC: 3.9 MMOL/L (ref 3.4–5.3)

## 2018-09-04 PROCEDURE — 84132 ASSAY OF SERUM POTASSIUM: CPT | Performed by: FAMILY MEDICINE

## 2018-09-04 PROCEDURE — 36415 COLL VENOUS BLD VENIPUNCTURE: CPT | Performed by: FAMILY MEDICINE

## 2018-09-04 NOTE — TELEPHONE ENCOUNTER
Attempted to reach pt at work-not working.  Attempted to reach at home-NA  Potassium result pending.  Suzette Mccrary RN

## 2018-09-05 NOTE — TELEPHONE ENCOUNTER
Patient received results in previous encounter. No medication needed.    Francisca Hilliard RN - BC

## 2018-09-17 NOTE — PATIENT INSTRUCTIONS
Before Your Surgery      Call your surgeon if there is any change in your health. This includes signs of a cold or flu (such as a sore throat, runny nose, cough, rash or fever).    Do not smoke, drink alcohol or take over the counter medicine (unless your surgeon or primary care doctor tells you to) for the 24 hours before and after surgery.    If you take prescribed drugs: Follow your doctor s orders about which medicines to take and which to stop until after surgery.    Eating and drinking prior to surgery: follow the instructions from your surgeon    Take a shower or bath the night before surgery. Use the soap your surgeon gave you to gently clean your skin. If you do not have soap from your surgeon, use your regular soap. Do not shave or scrub the surgery site.  Wear clean pajamas and have clean sheets on your bed.     [facility]    If you have any questions regarding to your visit please contact your care team:       Team Purple:   Clinic Hours Telephone Number   Dr. Farida Birch   7am-7pm  Monday - Thursday   7am-5pm  Fridays  [phone]  (Appointment scheduling available 24/7)   Urgent Care - [place] - 11am-9pm Monday-Friday Saturday-Sunday- 9am-5pm   [place] - 5pm-9pm Monday-Friday Saturday-Sunday- 9am-5pm  [phone] - [place]  [phone] - [place]       What options do I have for a visit other than an office visit? We offer electronic visits (e-visits) and telephone visits, when medically appropriate.  Please check with your medical insurance to see if these types of visits are covered, as you will be responsible for any charges that are not paid by your insurance.      You can use P2Binvestor (secure electronic communication) to access to your chart, send your primary care provider a message, or make an appointment. Ask a team member how to get started.     For a price quote for your services, please  call our Consumer Price Line at [phone] or our Imaging Cost estimation line at [phone] (for imaging tests).

## 2018-10-01 ENCOUNTER — OFFICE VISIT (OUTPATIENT)
Dept: FAMILY MEDICINE | Facility: CLINIC | Age: 53
End: 2018-10-01
Payer: COMMERCIAL

## 2018-10-01 VITALS
SYSTOLIC BLOOD PRESSURE: 130 MMHG | HEART RATE: 85 BPM | DIASTOLIC BLOOD PRESSURE: 60 MMHG | TEMPERATURE: 97.2 F | HEIGHT: 61 IN | WEIGHT: 195.6 LBS | RESPIRATION RATE: 14 BRPM | OXYGEN SATURATION: 95 % | BODY MASS INDEX: 36.93 KG/M2

## 2018-10-01 DIAGNOSIS — Z23 NEED FOR PROPHYLACTIC VACCINATION AND INOCULATION AGAINST INFLUENZA: ICD-10-CM

## 2018-10-01 DIAGNOSIS — F43.23 SITUATIONAL MIXED ANXIETY AND DEPRESSIVE DISORDER: ICD-10-CM

## 2018-10-01 DIAGNOSIS — F41.9 ANXIETY: ICD-10-CM

## 2018-10-01 DIAGNOSIS — Z01.818 PREOP GENERAL PHYSICAL EXAM: Primary | ICD-10-CM

## 2018-10-01 DIAGNOSIS — E87.6 HYPOKALEMIA: ICD-10-CM

## 2018-10-01 PROCEDURE — 99214 OFFICE O/P EST MOD 30 MIN: CPT | Mod: 25 | Performed by: FAMILY MEDICINE

## 2018-10-01 PROCEDURE — 90471 IMMUNIZATION ADMIN: CPT | Performed by: FAMILY MEDICINE

## 2018-10-01 PROCEDURE — 90682 RIV4 VACC RECOMBINANT DNA IM: CPT | Performed by: FAMILY MEDICINE

## 2018-10-01 RX ORDER — POTASSIUM CHLORIDE 750 MG/1
10 TABLET, EXTENDED RELEASE ORAL DAILY
Qty: 90 TABLET | Refills: 4 | Status: SHIPPED | OUTPATIENT
Start: 2018-10-01 | End: 2019-04-29

## 2018-10-01 RX ORDER — CITALOPRAM HYDROBROMIDE 20 MG/1
TABLET ORAL
Qty: 90 TABLET | Refills: 4 | Status: SHIPPED | OUTPATIENT
Start: 2018-10-01 | End: 2019-04-29

## 2018-10-01 NOTE — MR AVS SNAPSHOT
After Visit Summary   10/1/2018    Juanita Lechuga    MRN: [id number]           Patient Information     Date Of Birth          1965        Visit Information        Provider Department      10/1/2018 10:00 AM Farida Cardona MD [facility]        Today's Diagnoses     Preop general physical exam    -  1    Hypokalemia        Situational mixed anxiety and depressive disorder        Anxiety        Need for prophylactic vaccination and inoculation against influenza          Care Instructions      Before Your Surgery      Call your surgeon if there is any change in your health. This includes signs of a cold or flu (such as a sore throat, runny nose, cough, rash or fever).    Do not smoke, drink alcohol or take over the counter medicine (unless your surgeon or primary care doctor tells you to) for the 24 hours before and after surgery.    If you take prescribed drugs: Follow your doctor s orders about which medicines to take and which to stop until after surgery.    Eating and drinking prior to surgery: follow the instructions from your surgeon    Take a shower or bath the night before surgery. Use the soap your surgeon gave you to gently clean your skin. If you do not have soap from your surgeon, use your regular soap. Do not shave or scrub the surgery site.  Wear clean pajamas and have clean sheets on your bed.     [facility]    If you have any questions regarding to your visit please contact your care team:       Team Purple:   Clinic Hours Telephone Number   Dr. Farida Birch   7am-7pm  Monday - Thursday   7am-5pm  Fridays  [phone]  (Appointment scheduling available 24/7)   Urgent Care - [place] - 11am-9pm Monday-Friday Saturday-Sunday- 9am-5pm   [place] - 5pm-9pm Monday-Friday Saturday-Sunday- 9am-5pm  [phone] - [place]  [phone] - [place]       What options do I have  for a visit other than an office visit? We offer electronic visits (e-visits) and telephone visits, when medically appropriate.  Please check with your medical insurance to see if these types of visits are covered, as you will be responsible for any charges that are not paid by your insurance.      You can use Suo Yi (secure electronic communication) to access to your chart, send your primary care provider a message, or make an appointment. Ask a team member how to get started.     For a price quote for your services, please call our Consumer Price Line at [phone] or our Imaging Cost estimation line at [phone] (for imaging tests).              Follow-ups after your visit        Your next 10 appointments already scheduled     Oct 09, 2018   Procedure with Micky Almaraz MD   [facility] (--)    [address]  [facility] [address]   [phone]            Oct 11, 2018  4:30 PM CDT   Post Op with Micky Almaraz MD   [facility]    [address] [place] [id number] [phone]            Oct 29, 2018  5:30 PM CDT   Post Op with Micky Almaraz MD   [facility] ([facility])    [address] [place] [id number] [phone]              Who to contact     If you have questions or need follow up information about today's clinic visit or your schedule please contact [facility] directly at [phone].  Normal or non-critical lab and imaging results will be communicated to you by Stancehart, letter or phone within 4 business days after the clinic has received the results. If you do not hear from us within 7 days, please contact the clinic through Stancehart or phone. If you have a critical or abnormal lab result, we will notify you by phone as soon as possible.  Submit refill requests through Suo Yi or call your pharmacy and they will forward the refill request  "to us. Please allow 3 business days for your refill to be completed.          Additional Information About Your Visit        [employer] (TU[employer]hart Information     Mobento gives you secure access to your electronic health record. If you see a primary care provider, you can also send messages to your care team and make appointments. If you have questions, please call your primary care clinic.  If you do not have a primary care provider, please call [phone] and they will assist you.        Care EveryWhere ID     This is your Care EveryWhere ID. This could be used by other organizations to access your [place] medical records  [id number]        Your Vitals Were     Pulse Temperature Respirations Height Pulse Oximetry BMI (Body Mass Index)    85 97.2  F (36.2  C) (Oral) 14 5' 1\" (1.549 m) 95% 36.96 kg/m2       Blood Pressure from Last 3 Encounters:   10/01/18 130/60   08/31/18 139/77   08/29/18 134/76    Weight from Last 3 Encounters:   10/01/18 195 lb 9.6 oz (88.7 kg)   08/31/18 193 lb (87.5 kg)   08/29/18 193 lb 3.2 oz (87.6 kg)              We Performed the Following     FLU VACCINE, (RIV4) RECOMBINANT HA  , IM (FluBlok, egg free) [97882]- >18 YRS (FMG recommended  50-64 YRS)     Vaccine Administration, Initial [62884]          Today's Medication Changes          These changes are accurate as of 10/1/18 10:15 AM.  If you have any questions, ask your nurse or doctor.               These medicines have changed or have updated prescriptions.        Dose/Directions    * celeXA 20 MG tablet   This may have changed:  Another medication with the same name was changed. Make sure you understand how and when to take each.   Generic drug:  citalopram   Changed by:  Farida Cardona MD        Dose:  20 mg   Take 20 mg by mouth daily   Refills:  0       * citalopram 20 MG tablet   Commonly known as:  celeXA   This may have changed:  See the new instructions.   Used for:  Situational mixed anxiety and depressive disorder, Anxiety "   Changed by:  Farida Cardona MD        TAKE ONE TABLET BY MOUTH ONCE DAILY   Quantity:  90 tablet   Refills:  4       * Notice:  This list has 2 medication(s) that are the same as other medications prescribed for you. Read the directions carefully, and ask your doctor or other care provider to review them with you.         Where to get your medicines      These medications were sent to [facility] [place], [place] - [address] [facility]  [address] [facility] [address]     Phone:  [phone]     citalopram 20 MG tablet    potassium chloride SA 10 MEQ CR tablet                Primary Care Provider Office Phone # Fax #    Farida Cardona [phone] [address] [facility] [phone]        Equal Access to Services     [facility]: Hadii dajuan rubi hadasho Soomaali, waaxda luqadaha, qaybta kaalmada adeegyada, waxesau monet . So [facility] [phone].    ATENCIÓN: Si habla español, tiene a hudson disposición servicios gratuitos de asistencia lingüística. [facility] [phone].    We comply with applicable federal civil rights laws and [place] laws. We do not discriminate on the basis of race, color, national origin, age, disability, sex, sexual orientation, or gender identity.            Thank you!     Thank you for choosing [facility]  for your care. Our goal is always to provide you with excellent care. Hearing back from our patients is one way we can continue to improve our services. Please take a few minutes to complete the written survey that you may receive in the mail after your visit with us. Thank you!             Your Updated Medication List - Protect others around you: Learn how to safely use, store and throw away your medicines at www.disposemymeds.org.          This list is accurate as of 10/1/18 10:15 AM.  Always use your most recent med list.                   Brand Name Dispense Instructions for  use Diagnosis    ALPRAZolam 0.25 MG tablet    XANAX    10 tablet    1-2 q 6 hours as needed    Anxiety       carvedilol 40 MG 24 hr capsule    COREG CR    90 capsule    TAKE ONE CAPSULE BY MOUTH ONCE DAILY    Benign essential hypertension       * celeXA 20 MG tablet   Generic drug:  citalopram      Take 20 mg by mouth daily        * citalopram 20 MG tablet    celeXA    90 tablet    TAKE ONE TABLET BY MOUTH ONCE DAILY    Situational mixed anxiety and depressive disorder, Anxiety       hydrALAZINE 25 MG tablet    APRESOLINE    180 tablet    Take one twice a day for high blood pressure    Benign essential hypertension       losartan-hydrochlorothiazide 100-25 MG per tablet    HYZAAR    90 tablet    Take 1 tablet by mouth daily    Benign essential hypertension       Multi-vitamin Tabs tablet   Generic drug:  multivitamin, therapeutic with minerals      1 TABLET DAILY    Knee pain       potassium chloride SA 10 MEQ CR tablet    K-DUR/KLOR-CON M    90 tablet    Take 1 tablet (10 mEq) by mouth daily    Hypokalemia       traMADol 50 MG tablet    ULTRAM    50 tablet    Take 1 tablet (50 mg) by mouth every 6 hours as needed for severe pain    Chronic pansinusitis, Deviated nasal septum       * Notice:  This list has 2 medication(s) that are the same as other medications prescribed for you. Read the directions carefully, and ask your doctor or other care provider to review them with you.

## 2018-10-01 NOTE — PROGRESS NOTES
[facility]  [address] [place] [id number]  [phone]  Dept: [phone]    PRE-OP EVALUATION:  Today's date: 10/1/2018    Juanita Lechuga (: 1965) presents for pre-operative evaluation assessment as requested by Dr. Micky Almaraz.  She requires evaluation and anesthesia risk assessment prior to undergoing surgery/procedure for treatment of recurrent sinus infections  .    Fax number for surgical facility:    Time of Surgery/Procedure: 9:15 AM  Primary Physician: Farida Cardona  Type of Anesthesia Anticipated: General    Patient has a Health Care Directive or Living Will:  NO    Preop Questions 10/1/2018   Who is doing your surgery? DR Thomas   What are you having done? Septopraphy   Date of Surgery/Procedure: 0ct.9   Facility or Hospital where procedure/surgery will be performed: [facility]   1.  Do you have a history of Heart attack, stroke, stent, coronary bypass surgery, or other heart surgery? No   2.  Do you ever have any pain or discomfort in your chest? No   3.  Do you have a history of  Heart Failure? No   4.   Are you troubled by shortness of breath when:  walking on a level surface, or up a slight hill, or at night? No   5.  Do you currently have a cold, bronchitis or other respiratory infection? No   6.  Do you have a cough, shortness of breath, or wheezing? No   7.  Do you sometimes get pains in the calves of your legs when you walk? No   8. Do you or anyone in your family have previous history of blood clots? No   9.  Do you or does anyone in your family have a serious bleeding problem such as prolonged bleeding following surgeries or cuts? No   10. Have you ever had problems with anemia or been told to take iron pills? No   11. Have you had any abnormal blood loss such as black, tarry or bloody stools, or abnormal vaginal bleeding? No   12. Have you ever had a blood transfusion? No   13. Have you or any of your relatives ever had  problems with anesthesia? No   14. Do you have sleep apnea, excessive snoring or daytime drowsiness? No   15. Do you have any prosthetic heart valves? No   16. Do you have prosthetic joints? No   17. Is there any chance that you may be pregnant? No         HPI: patient has history of recurrent sinus infections. Needs surgery      HPI related to upcoming procedure: patient needs surgery for recurrent sinus infection       HYPERTENSION - Patient has longstanding history of HTN , currently denies any symptoms referable to elevated blood pressure. Specifically denies chest pain, palpitations, dyspnea, orthopnea, PND or peripheral edema. Blood pressure readings have been in normal range. Current medication regimen is as listed below. Patient denies any side effects of medication.                                                                                                                                                                                          .    MEDICAL HISTORY:     Patient Active Problem List    Diagnosis Date Noted     Chronic pansinusitis 08/31/2018     Priority: Medium     Morbid obesity (H) 08/30/2018     Priority: Medium     Benign essential hypertension 08/08/2018     Priority: Medium     Facial pressure 08/02/2018     Priority: Medium     Ear fullness, bilateral 08/02/2018     Priority: Medium     Hypertrophy of breast 03/23/2018     Priority: Medium     Non morbid obesity due to excess calories 02/24/2017     Priority: Medium     Anxiety 08/09/2012     Priority: Medium     Adjustment disorder with mixed anxiety and depressed mood 05/24/2012     Priority: Medium     Situational mixed anxiety and depressive disorder 04/26/2012     Priority: Medium     Advanced directives, counseling/discussion 04/02/2012     Priority: Medium     Discussed Advance Directive planning with patient; information given to patient to review.  Riya Joe LPN         CARDIOVASCULAR SCREENING; LDL GOAL LESS THAN 130  10/31/2010     Priority: Medium     Old disruption of anterior cruciate ligament 04/08/2010     Priority: Medium      Past Medical History:   Diagnosis Date     ACL tear 12/09    left, improved with physical therapy     COPD (chronic obstructive pulmonary disease) (H) 3/29/2012    resolved after she quit smoking     HTN (hypertension)      Past Surgical History:   Procedure Laterality Date     BUNIONECTOMY RT/LT  8/7/2008    (L) first toe     BUNIONECTOMY RT/LT  9/20/12    right first toe     Current Outpatient Prescriptions   Medication Sig Dispense Refill     ALPRAZolam (XANAX) 0.25 MG tablet 1-2 q 6 hours as needed 10 tablet 0     carvedilol (COREG CR) 40 MG 24 hr capsule TAKE ONE CAPSULE BY MOUTH ONCE DAILY 90 capsule 3     citalopram (CELEXA) 20 MG tablet TAKE ONE TABLET BY MOUTH ONCE DAILY 90 tablet 4     hydrALAZINE (APRESOLINE) 25 MG tablet Take one twice a day for high blood pressure 180 tablet 3     losartan-hydrochlorothiazide (HYZAAR) 100-25 MG per tablet Take 1 tablet by mouth daily 90 tablet 4     MULTI-VITAMIN OR TABS 1 TABLET DAILY       potassium chloride SA (K-DUR/KLOR-CON M) 10 MEQ CR tablet Take 1 tablet (10 mEq) by mouth daily 90 tablet 4     traMADol (ULTRAM) 50 MG tablet Take 1 tablet (50 mg) by mouth every 6 hours as needed for severe pain 50 tablet 0     citalopram (CELEXA) 20 MG tablet Take 20 mg by mouth daily       [DISCONTINUED] citalopram (CELEXA) 20 MG tablet TAKE ONE-HALF TABLET BY MOUTH ONCE DAILY 45 tablet 4     OTC products: None, except as noted above    Allergies   Allergen Reactions     Lisinopril Cough      Latex Allergy: NO    Social History   Substance Use Topics     Smoking status: Former Smoker     Packs/day: 0.50     Years: 27.00     Types: Cigarettes     Quit date: 10/24/2011     Smokeless tobacco: Never Used     Alcohol use Yes      Comment: rare     History   Drug Use No     Immunization History   Administered Date(s) Administered     HEPA 05/08/2007, 06/06/2008,  "06/22/2009     Influenza (IIV3) PF 09/21/2011, 10/05/2012, 11/13/2013, 10/15/2014, 11/20/2015, 11/02/2016     Influenza Quad, Recombinant, p-free (RIV4) 10/01/2018     Influenza Vaccine IM 3yrs+ 4 Valent IIV4 10/12/2017     MMR 04/27/2017     Pneumococcal 23 valent 03/07/2012, 03/08/2013     TDAP Vaccine (Adacel) 03/23/2018     Td (Adult), Adsorbed 01/17/2001     Tdap (Adacel,Boostrix) 05/08/2007, 06/06/2008     Zoster vaccine recombinant adjuvanted (SHINGRIX) 03/23/2018, 06/15/2018      REVIEW OF SYSTEMS:   CONSTITUTIONAL: NEGATIVE for fever, chills, change in weight  INTEGUMENTARY/SKIN: NEGATIVE for worrisome rashes, moles or lesions  EYES: NEGATIVE for vision changes or irritation  ENT/MOUTH: NEGATIVE for ear, mouth and throat problems  RESP: NEGATIVE for significant cough or SOB  BREAST: NEGATIVE for masses, tenderness or discharge  CV: NEGATIVE for chest pain, palpitations or peripheral edema  GI: NEGATIVE for nausea, abdominal pain, heartburn, or change in bowel habits  : NEGATIVE for frequency, dysuria, or hematuria  MUSCULOSKELETAL: NEGATIVE for significant arthralgias or myalgia  NEURO: NEGATIVE for weakness, dizziness or paresthesias  ENDOCRINE: NEGATIVE for temperature intolerance, skin/hair changes  HEME: NEGATIVE for bleeding problems  PSYCHIATRIC: NEGATIVE for changes in mood or affect    EXAM:   /60  Pulse 85  Temp 97.2  F (36.2  C) (Oral)  Resp 14  Ht 5' 1\" (1.549 m)  Wt 195 lb 9.6 oz (88.7 kg)  SpO2 95%  BMI 36.96 kg/m2    GENERAL APPEARANCE: healthy, alert and no distress     EYES: EOMI, PERRL     HENT: ear canals and TM's normal and nose and mouth without ulcers or lesions     NECK: no adenopathy, no asymmetry, masses, or scars and thyroid normal to palpation     RESP: lungs clear to auscultation - no rales, rhonchi or wheezes     CV: regular rates and rhythm, normal S1 S2, no S3 or S4 and no murmur, click or rub     ABDOMEN:  soft, nontender, no HSM or masses and bowel sounds " normal     MS: extremities normal- no gross deformities noted, no evidence of inflammation in joints, FROM in all extremities.     SKIN: no suspicious lesions or rashes     NEURO: Normal strength and tone, sensory exam grossly normal, mentation intact and speech normal     PSYCH: mentation appears normal. and affect normal/bright     LYMPHATICS: No cervical adenopathy    DIAGNOSTICS:   EKG: appears normal, NSR none seen on Care Everywhere     Recent Labs   Lab Test  09/04/18   1143  08/29/18   1241  03/23/18   0859  10/05/17   1719  02/24/17   1055   04/02/12   1501   HGB   --    --    --   13.2   --    --   13.0   PLT   --    --    --   256   --    --    --    NA   --    --   139   --   137   < >   --    POTASSIUM  3.9  3.3*  4.0   --   3.9   < >   --    CR   --    --   0.45*   --   0.48*   < >   --     < > = values in this interval not displayed.        IMPRESSION:   Reason for surgery/procedure: recurrent sinus infections   Diagnosis/reason for consult: need for clearance, hypertension hard to control due to anxiety     The proposed surgical procedure is considered LOW risk.    REVISED CARDIAC RISK INDEX  The patient has the following serious cardiovascular risks for perioperative complications such as (MI, PE, VFib and 3  AV Block):  No serious cardiac risks  INTERPRETATION: 0 risks: Class I (very low risk - 0.4% complication rate)    The patient has the following additional risks for perioperative complications:  No identified additional risks      ICD-10-CM    1. Preop general physical exam Z01.818    2. Hypokalemia E87.6 potassium chloride SA (K-DUR/KLOR-CON M) 10 MEQ CR tablet   3. Situational mixed anxiety and depressive disorder F43.23 citalopram (CELEXA) 20 MG tablet   4. Anxiety F41.9 citalopram (CELEXA) 20 MG tablet   5. Need for prophylactic vaccination and inoculation against influenza Z23 FLU VACCINE, (RIV4) RECOMBINANT HA  , IM (FluBlok, egg free) [10230]- >18 YRS (G recommended  50-64 YRS)      Vaccine Administration, Initial [38331]       RECOMMENDATIONS:     --Consult hospital rounder / IM to assist post-op medical management    --Patient is to take all scheduled medications on the day of surgery EXCEPT for modifications listed below.    APPROVAL GIVEN to proceed with proposed procedure, without further diagnostic evaluation       Signed Electronically by: LUCIANO KIM MD    Copy of this evaluation report is provided to requesting physician.    [facility] Preop Guidelines    Revised Cardiac Risk Index    Injectable Influenza Immunization Documentation    1.  Is the person to be vaccinated sick today?   No    2. Does the person to be vaccinated have an allergy to a component   of the vaccine?   No  Egg Allergy Algorithm Link    3. Has the person to be vaccinated ever had a serious reaction   to influenza vaccine in the past?   No    4. Has the person to be vaccinated ever had Guillain-Barré syndrome?   No    Form completed by Ira Velásquez MA

## 2018-10-02 RX ORDER — POTASSIUM CHLORIDE 750 MG/1
TABLET, EXTENDED RELEASE ORAL
COMMUNITY
End: 2019-01-10

## 2018-10-08 ENCOUNTER — ANESTHESIA EVENT (OUTPATIENT)
Dept: SURGERY | Facility: AMBULATORY SURGERY CENTER | Age: 53
End: 2018-10-08

## 2018-10-09 ENCOUNTER — ANESTHESIA (OUTPATIENT)
Dept: SURGERY | Facility: AMBULATORY SURGERY CENTER | Age: 53
End: 2018-10-09
Payer: COMMERCIAL

## 2018-10-09 ENCOUNTER — HOSPITAL ENCOUNTER (OUTPATIENT)
Facility: AMBULATORY SURGERY CENTER | Age: 53
Discharge: HOME | End: 2018-10-09
Attending: OTOLARYNGOLOGY | Admitting: OTOLARYNGOLOGY
Payer: COMMERCIAL

## 2018-10-09 VITALS
OXYGEN SATURATION: 95 % | TEMPERATURE: 98 F | DIASTOLIC BLOOD PRESSURE: 81 MMHG | RESPIRATION RATE: 11 BRPM | SYSTOLIC BLOOD PRESSURE: 147 MMHG

## 2018-10-09 DIAGNOSIS — G89.18 ACUTE POST-OPERATIVE PAIN: ICD-10-CM

## 2018-10-09 DIAGNOSIS — J32.4 CHRONIC PANSINUSITIS: Primary | ICD-10-CM

## 2018-10-09 PROCEDURE — G8916 PT W IV AB GIVEN ON TIME: HCPCS

## 2018-10-09 PROCEDURE — 61782 SCAN PROC CRANIAL EXTRA: CPT

## 2018-10-09 PROCEDURE — 31253 NSL/SINS NDSC TOTAL: CPT | Mod: 50 | Performed by: OTOLARYNGOLOGY

## 2018-10-09 PROCEDURE — 61782 SCAN PROC CRANIAL EXTRA: CPT | Performed by: OTOLARYNGOLOGY

## 2018-10-09 PROCEDURE — 31267 ENDOSCOPY MAXILLARY SINUS: CPT | Mod: 50 | Performed by: OTOLARYNGOLOGY

## 2018-10-09 PROCEDURE — 31267 ENDOSCOPY MAXILLARY SINUS: CPT | Mod: 50

## 2018-10-09 PROCEDURE — 30520 REPAIR OF NASAL SEPTUM: CPT | Performed by: OTOLARYNGOLOGY

## 2018-10-09 PROCEDURE — G8907 PT DOC NO EVENTS ON DISCHARG: HCPCS

## 2018-10-09 PROCEDURE — 88305 TISSUE EXAM BY PATHOLOGIST: CPT | Performed by: OTOLARYNGOLOGY

## 2018-10-09 PROCEDURE — 30140 RESECT INFERIOR TURBINATE: CPT | Mod: 50 | Performed by: OTOLARYNGOLOGY

## 2018-10-09 PROCEDURE — 30140 RESECT INFERIOR TURBINATE: CPT | Mod: 50

## 2018-10-09 PROCEDURE — 30520 REPAIR OF NASAL SEPTUM: CPT

## 2018-10-09 PROCEDURE — 31253 NSL/SINS NDSC TOTAL: CPT | Mod: 50

## 2018-10-09 RX ORDER — ACETAMINOPHEN 325 MG/1
975 TABLET ORAL ONCE
Status: COMPLETED | OUTPATIENT
Start: 2018-10-09 | End: 2018-10-09

## 2018-10-09 RX ORDER — METOPROLOL TARTRATE 1 MG/ML
1-2 INJECTION, SOLUTION INTRAVENOUS EVERY 5 MIN PRN
Status: DISCONTINUED | OUTPATIENT
Start: 2018-10-09 | End: 2018-10-10 | Stop reason: HOSPADM

## 2018-10-09 RX ORDER — FENTANYL CITRATE 50 UG/ML
25-50 INJECTION, SOLUTION INTRAMUSCULAR; INTRAVENOUS
Status: DISCONTINUED | OUTPATIENT
Start: 2018-10-09 | End: 2018-10-10 | Stop reason: HOSPADM

## 2018-10-09 RX ORDER — ONDANSETRON 4 MG/1
4 TABLET, ORALLY DISINTEGRATING ORAL EVERY 30 MIN PRN
Status: DISCONTINUED | OUTPATIENT
Start: 2018-10-09 | End: 2018-10-10 | Stop reason: HOSPADM

## 2018-10-09 RX ORDER — OXYCODONE HYDROCHLORIDE 5 MG/1
10 TABLET ORAL EVERY 4 HOURS PRN
Status: DISCONTINUED | OUTPATIENT
Start: 2018-10-09 | End: 2018-10-10 | Stop reason: HOSPADM

## 2018-10-09 RX ORDER — ONDANSETRON 2 MG/ML
INJECTION INTRAMUSCULAR; INTRAVENOUS PRN
Status: DISCONTINUED | OUTPATIENT
Start: 2018-10-09 | End: 2018-10-09

## 2018-10-09 RX ORDER — CEFAZOLIN SODIUM 1 G/3ML
1 INJECTION, POWDER, FOR SOLUTION INTRAMUSCULAR; INTRAVENOUS SEE ADMIN INSTRUCTIONS
Status: DISCONTINUED | OUTPATIENT
Start: 2018-10-09 | End: 2018-10-10 | Stop reason: HOSPADM

## 2018-10-09 RX ORDER — DEXAMETHASONE SODIUM PHOSPHATE 4 MG/ML
10 INJECTION, SOLUTION INTRA-ARTICULAR; INTRALESIONAL; INTRAMUSCULAR; INTRAVENOUS; SOFT TISSUE ONCE
Status: COMPLETED | OUTPATIENT
Start: 2018-10-09 | End: 2018-10-09

## 2018-10-09 RX ORDER — ONDANSETRON 8 MG/1
8 TABLET, FILM COATED ORAL EVERY 8 HOURS PRN
Qty: 10 TABLET | Refills: 1 | Status: SHIPPED | OUTPATIENT
Start: 2018-10-09 | End: 2019-04-29

## 2018-10-09 RX ORDER — SODIUM CHLORIDE, SODIUM LACTATE, POTASSIUM CHLORIDE, CALCIUM CHLORIDE 600; 310; 30; 20 MG/100ML; MG/100ML; MG/100ML; MG/100ML
INJECTION, SOLUTION INTRAVENOUS CONTINUOUS
Status: DISCONTINUED | OUTPATIENT
Start: 2018-10-09 | End: 2018-10-10 | Stop reason: HOSPADM

## 2018-10-09 RX ORDER — HYDROCODONE BITARTRATE AND ACETAMINOPHEN 5; 325 MG/1; MG/1
1 TABLET ORAL EVERY 4 HOURS PRN
Qty: 40 TABLET | Refills: 0 | Status: SHIPPED | OUTPATIENT
Start: 2018-10-09 | End: 2019-04-29

## 2018-10-09 RX ORDER — NALOXONE HYDROCHLORIDE 0.4 MG/ML
.1-.4 INJECTION, SOLUTION INTRAMUSCULAR; INTRAVENOUS; SUBCUTANEOUS
Status: DISCONTINUED | OUTPATIENT
Start: 2018-10-09 | End: 2018-10-10 | Stop reason: HOSPADM

## 2018-10-09 RX ORDER — AMOXICILLIN AND CLAVULANATE POTASSIUM 500; 125 MG/1; MG/1
1 TABLET, FILM COATED ORAL 2 TIMES DAILY
Qty: 14 TABLET | Refills: 0 | Status: SHIPPED | OUTPATIENT
Start: 2018-10-09 | End: 2018-10-16

## 2018-10-09 RX ORDER — GABAPENTIN 300 MG/1
300 CAPSULE ORAL ONCE
Status: COMPLETED | OUTPATIENT
Start: 2018-10-09 | End: 2018-10-09

## 2018-10-09 RX ORDER — LIDOCAINE HYDROCHLORIDE 20 MG/ML
INJECTION, SOLUTION INFILTRATION; PERINEURAL PRN
Status: DISCONTINUED | OUTPATIENT
Start: 2018-10-09 | End: 2018-10-09

## 2018-10-09 RX ORDER — HYDRALAZINE HYDROCHLORIDE 20 MG/ML
2.5-5 INJECTION INTRAMUSCULAR; INTRAVENOUS EVERY 10 MIN PRN
Status: DISCONTINUED | OUTPATIENT
Start: 2018-10-09 | End: 2018-10-10 | Stop reason: HOSPADM

## 2018-10-09 RX ORDER — HYDROMORPHONE HYDROCHLORIDE 1 MG/ML
.3-.5 INJECTION, SOLUTION INTRAMUSCULAR; INTRAVENOUS; SUBCUTANEOUS EVERY 10 MIN PRN
Status: DISCONTINUED | OUTPATIENT
Start: 2018-10-09 | End: 2018-10-10 | Stop reason: HOSPADM

## 2018-10-09 RX ORDER — MEPERIDINE HYDROCHLORIDE 25 MG/ML
12.5 INJECTION INTRAMUSCULAR; INTRAVENOUS; SUBCUTANEOUS
Status: DISCONTINUED | OUTPATIENT
Start: 2018-10-09 | End: 2018-10-10 | Stop reason: HOSPADM

## 2018-10-09 RX ORDER — ONDANSETRON 2 MG/ML
4 INJECTION INTRAMUSCULAR; INTRAVENOUS EVERY 30 MIN PRN
Status: DISCONTINUED | OUTPATIENT
Start: 2018-10-09 | End: 2018-10-10 | Stop reason: HOSPADM

## 2018-10-09 RX ORDER — DEXAMETHASONE SODIUM PHOSPHATE 4 MG/ML
4 INJECTION, SOLUTION INTRA-ARTICULAR; INTRALESIONAL; INTRAMUSCULAR; INTRAVENOUS; SOFT TISSUE EVERY 10 MIN PRN
Status: DISCONTINUED | OUTPATIENT
Start: 2018-10-09 | End: 2018-10-10 | Stop reason: HOSPADM

## 2018-10-09 RX ORDER — LIDOCAINE HYDROCHLORIDE AND EPINEPHRINE 10; 10 MG/ML; UG/ML
INJECTION, SOLUTION INFILTRATION; PERINEURAL PRN
Status: DISCONTINUED | OUTPATIENT
Start: 2018-10-09 | End: 2018-10-09 | Stop reason: HOSPADM

## 2018-10-09 RX ORDER — PROPOFOL 10 MG/ML
INJECTION, EMULSION INTRAVENOUS PRN
Status: DISCONTINUED | OUTPATIENT
Start: 2018-10-09 | End: 2018-10-09

## 2018-10-09 RX ORDER — LIDOCAINE 40 MG/G
CREAM TOPICAL
Status: DISCONTINUED | OUTPATIENT
Start: 2018-10-09 | End: 2018-10-10 | Stop reason: HOSPADM

## 2018-10-09 RX ORDER — ALBUTEROL SULFATE 0.83 MG/ML
2.5 SOLUTION RESPIRATORY (INHALATION) EVERY 4 HOURS PRN
Status: DISCONTINUED | OUTPATIENT
Start: 2018-10-09 | End: 2018-10-10 | Stop reason: HOSPADM

## 2018-10-09 RX ORDER — CEFAZOLIN SODIUM 2 G/100ML
2 INJECTION, SOLUTION INTRAVENOUS
Status: COMPLETED | OUTPATIENT
Start: 2018-10-09 | End: 2018-10-09

## 2018-10-09 RX ORDER — PHYSOSTIGMINE SALICYLATE 1 MG/ML
1.2 INJECTION INTRAVENOUS
Status: DISCONTINUED | OUTPATIENT
Start: 2018-10-09 | End: 2018-10-10 | Stop reason: HOSPADM

## 2018-10-09 RX ORDER — EPHEDRINE SULFATE 50 MG/ML
INJECTION, SOLUTION INTRAMUSCULAR; INTRAVENOUS; SUBCUTANEOUS PRN
Status: DISCONTINUED | OUTPATIENT
Start: 2018-10-09 | End: 2018-10-09

## 2018-10-09 RX ORDER — FENTANYL CITRATE 50 UG/ML
INJECTION, SOLUTION INTRAMUSCULAR; INTRAVENOUS PRN
Status: DISCONTINUED | OUTPATIENT
Start: 2018-10-09 | End: 2018-10-09

## 2018-10-09 RX ADMIN — FENTANYL CITRATE 50 MCG: 50 INJECTION, SOLUTION INTRAMUSCULAR; INTRAVENOUS at 08:02

## 2018-10-09 RX ADMIN — ACETAMINOPHEN 975 MG: 325 TABLET ORAL at 07:09

## 2018-10-09 RX ADMIN — SODIUM CHLORIDE, SODIUM LACTATE, POTASSIUM CHLORIDE, CALCIUM CHLORIDE: 600; 310; 30; 20 INJECTION, SOLUTION INTRAVENOUS at 07:10

## 2018-10-09 RX ADMIN — EPHEDRINE SULFATE 10 MG: 50 INJECTION, SOLUTION INTRAMUSCULAR; INTRAVENOUS; SUBCUTANEOUS at 08:36

## 2018-10-09 RX ADMIN — FENTANYL CITRATE 50 MCG: 50 INJECTION, SOLUTION INTRAMUSCULAR; INTRAVENOUS at 08:22

## 2018-10-09 RX ADMIN — EPHEDRINE SULFATE 10 MG: 50 INJECTION, SOLUTION INTRAMUSCULAR; INTRAVENOUS; SUBCUTANEOUS at 08:17

## 2018-10-09 RX ADMIN — Medication 100 MCG: at 08:45

## 2018-10-09 RX ADMIN — PROPOFOL 200 MG: 10 INJECTION, EMULSION INTRAVENOUS at 08:02

## 2018-10-09 RX ADMIN — Medication 100 MCG: at 09:02

## 2018-10-09 RX ADMIN — Medication 100 MCG: at 09:06

## 2018-10-09 RX ADMIN — Medication 30 MG: at 08:02

## 2018-10-09 RX ADMIN — Medication 100 MCG: at 08:57

## 2018-10-09 RX ADMIN — GABAPENTIN 300 MG: 300 CAPSULE ORAL at 07:09

## 2018-10-09 RX ADMIN — LIDOCAINE HYDROCHLORIDE 60 MG: 20 INJECTION, SOLUTION INFILTRATION; PERINEURAL at 08:02

## 2018-10-09 RX ADMIN — CEFAZOLIN SODIUM 2 G: 2 INJECTION, SOLUTION INTRAVENOUS at 07:56

## 2018-10-09 RX ADMIN — ONDANSETRON 4 MG: 2 INJECTION INTRAMUSCULAR; INTRAVENOUS at 08:50

## 2018-10-09 RX ADMIN — EPHEDRINE SULFATE 10 MG: 50 INJECTION, SOLUTION INTRAMUSCULAR; INTRAVENOUS; SUBCUTANEOUS at 08:29

## 2018-10-09 RX ADMIN — DEXAMETHASONE SODIUM PHOSPHATE 10 MG: 4 INJECTION, SOLUTION INTRA-ARTICULAR; INTRALESIONAL; INTRAMUSCULAR; INTRAVENOUS; SOFT TISSUE at 08:10

## 2018-10-09 ASSESSMENT — COPD QUESTIONNAIRES
COPD: 1
CAT_SEVERITY: MILD

## 2018-10-09 NOTE — ANESTHESIA POSTPROCEDURE EVALUATION
Patient: Juanita Lechuga    Procedure(s):  Image guided endoscopic sinus surgery endoscopic septoplasty - Wound Class: II-Clean Contaminated   - Wound Class: II-Clean Contaminated    Diagnosis:chronic sinusitis, devited nasal septum  Diagnosis Additional Information: No value filed.    Anesthesia Type:  General, ETT    Note:  Anesthesia Post Evaluation    Patient location during evaluation: PACU  Patient participation: Able to fully participate in evaluation  Level of consciousness: awake  Pain management: adequate  Airway patency: patent  Cardiovascular status: acceptable  Respiratory status: acceptable  Hydration status: balanced  PONV: none     Anesthetic complications: None          Last vitals:  Vitals:    10/09/18 0955 10/09/18 1000 10/09/18 1003   BP: 142/62  147/81   Resp: 13 27 11   Temp:      SpO2: 95% 95% 95%         Electronically Signed By: Gordon Patel MD  October 9, 2018  1:02 PM

## 2018-10-09 NOTE — OP NOTE
PREOPERATIVE DIAGNOSES:   1. Deviated nasal septum  2. Chronic sinusitis.   3. Nasal obstruction.     POSTOPERATIVE DIAGNOSES:   1. Deviated nasal septum  2. Chronic sinusitis.   3. Nasal obstruction.   4. Nasal polyposis  PROCEDURES PERFORMED:   1. Three-dimensional image-guided computer-assisted sinus surgery.   2. Bilateral endoscopic maxillary antrostomy with tissue removal.   3. Bilateral endoscopic frontal sinusotomy  4. Bilateral endoscopic total ethmoidectomy.   5. Endoscopic septoplasty  6. Bilateral endoscopic submucous resection of inferior turbinates.  SURGEON: Micky Almaraz MD   ASSISTANT: None  BLOOD LOSS: 30 mL.   COMPLICATIONS: None.   SPECIMENS: LEFT nasal cavity mass  ANESTHESIA: GETA.   INDICATIONS: Juanita Lechuga  presented to me with a history of chronic nasal disease and chronic sinusitis that failed multiple rounds of prolonged sinusitis.  CT scan confirmed pansinusitis, as well as a large nasal septal spur obstructing the LEFT nasal cavity. Therefore, my recommendation was for the above-named procedures. Preoperatively, risks discussed included the risks of infection, bleeding, the risks of general anesthesia, possible recurrence of polyp disease, possible injury to the eyes, base of skull and tear duct system, and possible alteration of sense of smell, although the patient has not had a sense of smell for many years. The patient understood these risks and possible outcomes and wished to proceed.   OPERATIVE PROCEDURE: After being taken to the operating room and induction of general endotracheal tube anesthesia, the bed was rotated 90 degrees. I attached the [employer] fusion electromagnetic reference strap to the head and the device was calibrated per 's recommendations. After that, he was prepped and draped in the normal clean fashion. I began by applying topical anesthetic in the form of 2 cottonoids on each side of the nose which had been soaked with a total of 4 mL of 4% liquid  cocaine.   I removed the cottonoids from the right side of the nose and entered the right nasal cavity.  I then entered with the spinal needle and 0 degree endoscope and injected the posterior lateral wall of the nasal cavity as well as the body of the right middle turbinate and the anterior insertion of the right middle turbinate.   After I did this field block,  I then proceeded with the sinus surgery, I used the rotating backbiter and trimmed away at the uncinate process to expose the natural ostium of the right maxillary sinus. I entered the right maxillary sinus with a 60 degree curved shaver blade and removed extensive amounts of polyps and mucopurulence and polypoid tissue from the right maxillary sinus. After this was done, I then confirmed the position of the ethmoid bulla using the image guidance system. I switched back to the 12 degree shaver and a 0 degree endoscope and took down the face of the ethmoid bulla. Immediately, copious amounts of thick yellow mucopurulent oozed out and I suctioned this away.   I skeletonized the horizontal lamella and proceeded directly posteriorly through several layers of posterior ethmoid air cells which had no pneumatization whatsoever and were completely filled with mucopurulent since and polyps. After reaching the face of the sphenoid sinus on the right side, I dissected upward and, using the image guidance system, confirmed that I had found the posterior extent of the roof of the ethmoid. I then was able to dissect in a posterior to anterior direction, removing polyps and bony septations along the way. Eventually I crossed the ethmoid infundibulum into the anterior ethmoid system.   At this point, I used the rotating backbiter to trim away at the anterior insertion of the right middle turbinate to expose the agger nasi cell. I used the 12 degree shaver to remove several more anterior ethmoid air cells. Once again, there was no pneumatization whatsoever. They were all  completely filled with purulence and polyps. I then used the image-guided curved sinus seeker to palpate the natural ostium to the right frontal sinus. Using a 60-degree shaver blade once again, I trimmed away at several more anterior ethmoid air cells to break into the right frontal ostium. After this was done, I trimmed down more diseased tissue and polyps to open the right frontal sinus os.   At this point, I had completed the right-sided sinus surgery.   At this point, the large bony septal spur needed to be removed to be able to perform surgery on the LEFT side. Due to the large leftward pointing spur, I performed an endoscopic septoplasty. I made a vertically oriented incision just anterior to the spur. I then dissected down onto the left side of the cartilaginous septum. I then was able to start a mucoperichondrial pocket directly on the left side of the cartilaginous septum, elevating the soft tissue off of the spur and inserted 0-degree endoscope to form my pocket and under direct endoscopic visualization. After I completely elevated the mucoperichondrium off the left side of the septum, I then made a hemitransfixion incision through the cartilage. I broke over to the right side and raised a submucoperiosteal flap on the entire right side of the part of the nasal septum with the spur, under direct endoscopic visualization. I then used a blaksly to remove the spur from the septum, and I removed it in one large piece. I laid the flaps back together and this significantly improved the left nasal airway. I then closed my septoplasty incision with simple mattress stitch using 4-0 chromic gut sutures.    At this point, I could now operate on the left nasal cavity.   I used a spinal needle with local anesthetic to inject the posterior lateral wall as well as the full length of the left inferior turbinate. I noted a very large mass consistent with a polyp filling the LEFT middle meatus, and I removed this with a  vincent from its origin at the axilla of the middle turbinate where it was obstructing the entire hiatus semilunaris.  I sent it as specimen.   After this was done, I  was able to see the uncinate process. I used the rotating backbiter to trim away at the uncinate bone to expose the natural ostium of the left maxillary sinus. Once I identified it, I was able to use the sinus shaver to trim away at the remainder of the uncinate and open the left maxillary sinus. It was completely overgrown with polyps and mucopurulence. I used the image guidance system to confirm the location of the medial wall of the orbit, and used a 60-degree sinus shaver and directly into the left maxillary sinus to trim away copious amounts of polyps and mucopurulence. After this was completely cleaned out, I switched back to the 12 degree shaver and a 0 degree endoscope.   I took down the remnants of the face of the ethmoid bulla and skeletonized the horizontal lamella and proceeded directly posteriorly through several layers of posterior ethmoid cells which were completely filled with polyps. Eventually I reached the posterior most left ethmoid cell. I identified and confirmed that I had located the roof of the posterior ethmoid system with the image guidance system. I then dissected in a posterior to anterior direction with the 12 degree shaver, trimming away diseased mucosa and polyps as well as bony septations.   I crossed the ethmoid infundibulum into the anterior ethmoid system and, using the rotating backbiter, trimmed away at the superiormost remnant of the uncinate bone to identify the agger nasi cell. Using a shaver, I trimmed away at the remainder of the floor of the agger nasi, and it was also completely filled with polyps. Using the image-guided curved sinus seeker, I then palpated up into the natural ostium of the left frontal sinus. After I identified the location, I used the 12 degree shaver to trim away at several more anterior  ethmoid air cells until I had exposed the left frontal ostium, which was also still filled with polyps and mucopurulence. This was debrided out and I had a clear view of the frontal os.  At this point, I completed the sinuses on the left side.   I proceeded to the final components of the operation, which was submucous resection of inferior turbinates. I switched to a 2 inferior turbinate blade and started on the left side. I made a stab incision at the anterior insertion of the left inferior turbinate and raised a submucoperiosteal tunnel along the medial surface of the left inferior turbinate bone. I then slowly withdrew the shaver blade as I ran the shaver to perform my submucous resection.   I then performed the same procedure on the right side, once again using the 2 mm turbinate blade to make a stab incision at the anterior insertion of the right inferior turbinate blade. I raised a submucoperiosteal tunnel along the entire medial surface of the right inferior turbinate bone and slowly withdrew the shaver as I ran the shaver function to perform submucous resection.   At this point, the entire procedure was now complete. I reinspected both sides of the nose and there was good hemostasis. I applied [place] hemostatic powder to the roof of the ethmoid bilaterally. I then placed small pieces of Nasopore dressing in between the middle turbinates and the lateral walls to prevent lateralization and scarring, and then to act as septoplasty splints, I took a second piece of Nasopore cut lengthwise and placed it in between the inferior turbinates and the nasal septum bilaterally for tamponade. All instruments were accounted for and all counts were correct. The patient's bed was rotated 90 degrees back to the care of anesthesia. She was awakened, extubated and sent to the recovery room in good condition.

## 2018-10-09 NOTE — IP AVS SNAPSHOT
[facility]    [address] [place] [address]    Phone:  [phone]                                       After Visit Summary   10/9/2018    Juanita Lechuga    MRN: [id number]           After Visit Summary Signature Page     I have received my discharge instructions, and my questions have been answered. I have discussed any challenges I see with this plan with the nurse or doctor.    ..........................................................................................................................................  Patient/Patient Representative Signature      ..........................................................................................................................................  Patient Representative Print Name and Relationship to Patient    ..................................................               ................................................  Date                                   Time    ..........................................................................................................................................  Reviewed by Signature/Title    ...................................................              ..............................................  Date                                               Time          22EPIC Rev 08/18

## 2018-10-09 NOTE — ANESTHESIA CARE TRANSFER NOTE
Patient: Juanita Lechuga    Procedure(s):  Image guided endoscopic sinus surgery endoscopic septoplasty - Wound Class: II-Clean Contaminated   - Wound Class: II-Clean Contaminated    Diagnosis: chronic sinusitis, devited nasal septum  Diagnosis Additional Information: No value filed.    Anesthesia Type:   General, ETT     Note:  Airway :Room Air  Patient transferred to:PACU  Comments: Prior to extubation, oropharynx gently suctioned, awake extubation, good aeration, SpO2 100%, equal bilateral breath sounds.  Transported to PACU on room air.  Patient awake, alert, SpO2 96% in PACU.  No apparent anesthesia complications.          Vitals: (Last set prior to Anesthesia Care Transfer)    CRNA VITALS  10/9/2018 0852 - 10/9/2018 0927      10/9/2018             Pulse: 96    SpO2: 90 %                Electronically Signed By: MORE Hernandez CRNA  October 9, 2018  9:27 AM

## 2018-10-09 NOTE — IP AVS SNAPSHOT
MRN:[id number]                      After Visit Summary   10/9/2018    Juanita Lechuga    MRN: [id number]           Thank you!     Thank you for choosing [facility] for your care. Our goal is always to provide you with excellent care. Hearing back from our patients is one way we can continue to improve our services. Please take a few minutes to complete the written survey that you may receive in the mail after you visit with us. Thank you!        Patient Information     Date Of Birth          1965        About your hospital stay     You were admitted on:  October 9, 2018 You last received care in the:  [facility]    You were discharged on:  October 9, 2018       Who to Call     For medical emergencies, please call 911.  For non-urgent questions about your medical care, please call your primary care provider or clinic, [phone]  For questions related to your surgery, please call your surgery clinic        Attending Provider     Provider Specialty    Micky Almaraz MD Otolaryngology       Primary Care Provider Office Phone # Fax #    Farida Cardona [phone]      Your next 10 appointments already scheduled     Oct 11, 2018  4:30 PM CDT   Post Op with Micky Almaraz MD   [facility] ([facility]    [address] [place] [phone]            Oct 29, 2018  5:30 PM CDT   Post Op with Micky Almaraz MD   [facility] ([address] [address]   [phone]              Further instructions from your care team       [facility]  Same-Day Surgery   Adult Discharge Orders & Instructions   For 24 hours after surgery  1. Get plenty of rest.  A responsible adult must stay with you for at least 24 hours after you leave the hospital.   2. Do not drive or use heavy equipment.  If you have weakness or  tingling, don't drive or use heavy equipment until this feeling goes away.  3. Do not drink alcohol.  4. Avoid strenuous or risky activities.  Ask for help when climbing stairs.   5. You may feel lightheaded.  IF so, sit for a few minutes before standing.  Have someone help you get up.   6. If you have nausea (feel sick to your stomach): Drink only clear liquids such as apple juice, ginger ale, broth or 7-Up.  Rest may also help.  Be sure to drink enough fluids.  Move to a regular diet as you feel able.  7. You may have a slight fever. Call the doctor if your fever is over 100 F (37.7 C) (taken under the tongue) or lasts longer than 24 hours.  8. You may have a dry mouth, a sore throat, muscle aches or trouble sleeping.  These should go away after 24 hours.  9. Do not make important or legal decisions.   Call your doctor for any of the followin.  Signs of infection (fever, growing tenderness at the surgery site, a large amount of drainage or bleeding, severe pain, foul-smelling drainage, redness, swelling).    2. It has been over 8 to 10 hours since surgery and you are still not able to urinate (pass water).    3.  Headache for over 24 hours.       To contact Dr Yoon call:  [phone]    Tylenol was given at 7 :00 AM    Instructions for Septoplasty    Recovery - Everyone recovers differently from a general anesthetic.  Symptoms such as fatigue, nausea, lightheadedness, and sometimes a low grade fever (up to 100 degrees) are not unusual.  As your body removes the anesthetic drugs from circulation, these symptoms will resolve.  Your nose will be sore and throbbing after surgery, and you may even have symptoms similar to a sinus infection with headache, congestion, and pressure.  These will resolve with healing.  For several days you may experience bloody drainage from the nose, please use the drip pad as necessary for this.  If there is persistent bleeding, please call the office during business hours or the on  call ENT physician after hours.  It is common for the front teeth to ache after septoplasty and sinus surgery.  This resolves with healing.  There are no diet restrictions after septoplasty, and you can resume your home medications.  Please blow your nose very very gently for two weeks after surgery.  Limit your activity to no strenuous activities until I see you for the first follow up visit, and sleep with your head elevated.    Medications - You were sent home with narcotic pain medication.  If you can tolerate the discomfort during your recovery by using just plain Tylenol or ibuprofen (advil), please do so.  However, do not hesitate to use the stronger pain medication if needed.  If you were sent home with an antibiotic, it is primarily used to help the healing process.  If it causes loose bowel movements or other signs of intolerance, it is appropriate to discontinue it.      Complications - Problems related to septoplasty almost always are detected during the operation, and special instruction will be given in that situation.  However, unexpected things can happen.  If you experience persistent bleeding, fevers, changes in vision, and severe headaches, this may be the sign of an infection.  Any of these symptoms should be called into my office or to the on call ENT if after hours.   The most common non-emergency distant complication of septoplasty is the septum healing crooked.  Although rare, this can happen.  There may be small plastic pieces placed inside your nose during surgery (splints).  These help to promote the septum into healing in its straightened position, and will be removed at the follow up visit.    Follow up - As mentioned, splints may be removed at the follow up visit.  This is not as bad as it sounds.  And afterwards, the improvement you will expereince in breathing from the septoplasty is usually dramatic and immediate.  I will then see you 4 to 6 weeks after that visit to make sure that  everything has healed appropriately.    If there are any questions or issues with the above, or if there are other issues that concern you, always feel free to call the clinic and I am happy to speak with you as soon as I can.    Brandon Almaraz MD  Otolaryngology  [facility]  [phone] After hours, [facility] option      Instructions for Sinus Surgery    Recovery - Everyone recovers differently from a general anesthetic.  Symptoms such as fatigue, nausea, light-headedness, and sometimes a low grade fever (up to 100 degrees) are not unusual.  As your body removes the anesthetic drugs from circulation, these symptoms will resolve.  Your nose will be sore after surgery, and you may even have symptoms similar to a sinus infection with headache, congestion, and pressure.  These will resolve with healing.  For several days you may experience bloody drainage from the nose, please use the drip pad as necessary for this.  If there is persistent bleeding, please call the office during business hours or the on call ENT physician after hours.  There are no diet restrictions after sinus surgery, and you can resume your home medications.  Please blow your nose very gently for two weeks after surgery.  Limit your activity to no strenuous activities until I see you for the first follow-up visit.      Medications - You were sent home with narcotic pain medication.  If you can tolerate the discomfort during your recovery by using just plain Tylenol or ibuprofen (advil), please do so.  However, do not hesitate to use the stronger pain medication if needed.  If you were sent home with an antibiotic, it is primarily used to help the healing process.  If it causes loose bowel movements or other signs of intolerance, it is appropriate to discontinue it.  By far the most important measure you can take to speed recovery, and maximize the chances of long term success of sinus surgery is using the sinus rinses at  least three time per day for the first month after surgery.   Please start these:     Tomorrow    Complications - Problems related to sinus surgery almost always are detected during the operation, and special instruction will be given in that situation.  However, unexpected things can happen, and are all related to the structures around the sinus cavities.  Symptoms that should alert you to a possible problem include: severe eye pain or eye swelling, persistent heavy bleeding from the nose, and high fevers with headache and neck pain.  Any of these symptoms should be called into my office or to the on call ENT if after hours.  The most common non-emergency complication of sinus surgery is the formation of scar tissue which can re-block the sinuses.  This is addressed below.    Follow-up -  As you have noted, there are quite a few follow-up visits after sinus surgery.  This is done to aggressively manage the most common complication of this technique, which is scar tissue blocking the sinuses.  These visits will require the examination of your nose and possibly removal of crusts of dry mucous and blood, with possible removal of early scar tissue.  Please prepare for these visits by using your sinus rinses.    If there are any questions or issues with the above, or if there are other issues that concern you, always feel free to call the clinic and I am happy to speak with you as soon as I can.    Brandon Almaraz MD  Otolaryngology  [facility]  [phone] After hours, [facility] option    Additional Information     If you use hormonal birth control (such as the pill, patch, ring or implants): You'll need a second form of birth control for 7 days (condoms, a diaphragm or contraceptive foam). While in the hospital, you received a medicine called Bridion. Your normal birth control will not work as well for a week after taking this medicine.          Pending Results     No orders found from  10/7/2018 to 10/10/2018.            Admission Information     Date & Time Provider Department Dept. Phone    10/9/2018 Micky Almarza MD [facility] [phone]      Your Vitals Were     Blood Pressure Temperature Respirations Last Period Pulse Oximetry       145/69 98  F (36.7  C) (Temporal) 17 12/28/2015 (Approximate) 94%       MyChart Information     [employer] gives you secure access to your electronic health record. If you see a primary care provider, you can also send messages to your care team and make appointments. If you have questions, please call your primary care clinic.  If you do not have a primary care provider, please call [phone] and they will assist you.        Care EveryWhere ID     This is your Care EveryWhere ID. This could be used by other organizations to access your [facility] medical records  [id number]        Equal Access to Services     JESSICA CASTELLANO : Jun Hoover, margie gruber, anika razo, loree monet . So [facility] [phone].    ATENCIÓN: Si habla español, tiene a hudson disposición servicios gratuitos de asistencia lingüística. Llame al [phone].    We comply with applicable federal civil rights laws and [place] laws. We do not discriminate on the basis of race, color, national origin, age, disability, sex, sexual orientation, or gender identity.               Review of your medicines      UNREVIEWED medicines. Ask your doctor about these medicines        Dose / Directions    ALPRAZolam 0.25 MG tablet   Commonly known as:  XANAX   Used for:  Anxiety        1-2 q 6 hours as needed   Quantity:  10 tablet   Refills:  0       carvedilol 40 MG 24 hr capsule   Commonly known as:  COREG CR   Used for:  Benign essential hypertension        TAKE ONE CAPSULE BY MOUTH ONCE DAILY   Quantity:  90 capsule   Refills:  3       * celeXA 20 MG tablet   Generic drug:  citalopram        Dose:  20 mg   Take 20 mg by  mouth daily   Refills:  0       * citalopram 20 MG tablet   Commonly known as:  celeXA   Used for:  Situational mixed anxiety and depressive disorder, Anxiety        TAKE ONE TABLET BY MOUTH ONCE DAILY   Quantity:  90 tablet   Refills:  4       hydrALAZINE 25 MG tablet   Commonly known as:  APRESOLINE   Used for:  Benign essential hypertension        Take one twice a day for high blood pressure   Quantity:  180 tablet   Refills:  3       KLOR-CON 10 MEQ tablet   Generic drug:  potassium chloride        daily oral   Refills:  0       losartan-hydrochlorothiazide 100-25 MG per tablet   Commonly known as:  HYZAAR   Used for:  Benign essential hypertension        Dose:  1 tablet   Take 1 tablet by mouth daily   Quantity:  90 tablet   Refills:  4       Multi-vitamin Tabs tablet   Used for:  Knee pain   Generic drug:  multivitamin, therapeutic with minerals        1 TABLET DAILY   Refills:  0       potassium chloride SA 10 MEQ CR tablet   Commonly known as:  K-DUR/KLOR-CON M   Used for:  Hypokalemia        Dose:  10 mEq   Take 1 tablet (10 mEq) by mouth daily   Quantity:  90 tablet   Refills:  4       traMADol 50 MG tablet   Commonly known as:  ULTRAM   Used for:  Chronic pansinusitis, Deviated nasal septum        Dose:  50 mg   Take 1 tablet (50 mg) by mouth every 6 hours as needed for severe pain   Quantity:  50 tablet   Refills:  0       * Notice:  This list has 2 medication(s) that are the same as other medications prescribed for you. Read the directions carefully, and ask your doctor or other care provider to review them with you.      START taking        Dose / Directions    amoxicillin-clavulanate 500-125 MG per tablet   Commonly known as:  AUGMENTIN   Used for:  Chronic pansinusitis        Dose:  1 tablet   Take 1 tablet by mouth 2 times daily for 7 days   Quantity:  14 tablet   Refills:  0       HYDROcodone-acetaminophen 5-325 MG per tablet   Commonly known as:  NORCO   Used for:  Chronic pansinusitis, Acute  post-operative pain        Dose:  1 tablet   Take 1 tablet by mouth every 4 hours as needed for pain   Quantity:  40 tablet   Refills:  0       ondansetron 8 MG tablet   Commonly known as:  ZOFRAN   Used for:  Chronic pansinusitis, Acute post-operative pain        Dose:  8 mg   Take 1 tablet (8 mg) by mouth every 8 hours as needed for nausea   Quantity:  10 tablet   Refills:  1            Where to get your medicines      Some of these will need a paper prescription and others can be bought over the counter. Ask your nurse if you have questions.     Bring a paper prescription for each of these medications     amoxicillin-clavulanate 500-125 MG per tablet    HYDROcodone-acetaminophen 5-325 MG per tablet    ondansetron 8 MG tablet                Protect others around you: Learn how to safely use, store and throw away your medicines at www.disposemymeds.org.        ANTIBIOTIC INSTRUCTION     You've Been Prescribed an Antibiotic - Now What?  Your healthcare team thinks that you or your loved one might have an infection. Some infections can be treated with antibiotics, which are powerful, life-saving drugs. Like all medications, antibiotics have side effects and should only be used when necessary. There are some important things you should know about your antibiotic treatment.      Your healthcare team may run tests before you start taking an antibiotic.    Your team may take samples (e.g., from your blood, urine or other areas) to run tests to look for bacteria. These test can be important to determine if you need an antibiotic at all and, if you do, which antibiotic will work best.      Within a few days, your healthcare team might change or even stop your antibiotic.    Your team may start you on an antibiotic while they are working to find out what is making you sick.    Your team might change your antibiotic because test results show that a different antibiotic would be better to treat your infection.    In some  cases, once your team has more information, they learn that you do not need an antibiotic at all. They may find out that you don't have an infection, or that the antibiotic you're taking won't work against your infection. For example, an infection caused by a virus can't be treated with antibiotics. Staying on an antibiotic when you don't need it is more likely to be harmful than helpful.      You may experience side effects from your antibiotic.    Like all medications, antibiotics have side effects. Some of these can be serious.    Let you healthcare team know if you have any known allergies when you are admitted to the hospital.    One significant side effect of nearly all antibiotics is the risk of severe and sometimes deadly diarrhea caused by Clostridium difficile (C. Difficile). This occurs when a person takes antibiotics because some good germs are destroyed. Antibiotic use allows C. diificile to take over, putting patients at high risk for this serious infection.    As a patient or caregiver, it is important to understand your or your loved one's antibiotic treatment. It is especially important for caregivers to speak up when patients can't speak for themselves. Here are some important questions to ask your healthcare team.    What infection is this antibiotic treating and how do you know I have that infection?    What side effects might occur from this antibiotic?    How long will I need to take this antibiotic?    Is it safe to take this antibiotic with other medications or supplements (e.g., vitamins) that I am taking?     Are there any special directions I need to know about taking this antibiotic? For example, should I take it with food?    How will I be monitored to know whether my infection is responding to the antibiotic?    What tests may help to make sure the right antibiotic is prescribed for me?      Information provided by:  www.cdc.gov/getsmart  U.S. Department of Health and Human  Services  Centers for disease Control and Prevention  National Center for Emerging and Zoonotic Infectious Diseases  Division of Healthcare Quality Promotion        Information about OPIOIDS     PRESCRIPTION OPIOIDS: WHAT YOU NEED TO KNOW   We gave you an opioid (narcotic) pain medicine. It is important to manage your pain, but opioids are not always the best choice. You should first try all the other options your care team gave you. Take this medicine for as short a time (and as few doses) as possible.    Some activities can increase your pain, such as bandage changes or therapy sessions. It may help to take your pain medicine 30 to 60 minutes before these activities. Reduce your stress by getting enough sleep, working on hobbies you enjoy and practicing relaxation or meditation. Talk to your care team about ways to manage your pain beyond prescription opioids.    These medicines have risks:    DO NOT drive when on new or higher doses of pain medicine. These medicines can affect your alertness and reaction times, and you could be arrested for driving under the influence (DUI). If you need to use opioids long-term, talk to your care team about driving.    DO NOT operate heavy machinery    DO NOT do any other dangerous activities while taking these medicines.    DO NOT drink any alcohol while taking these medicines.     If the opioid prescribed includes acetaminophen, DO NOT take with any other medicines that contain acetaminophen. Read all labels carefully. Look for the word  acetaminophen  or  Tylenol.  Ask your pharmacist if you have questions or are unsure.    You can get addicted to pain medicines, especially if you have a history of addiction (chemical, alcohol or substance dependence). Talk to your care team about ways to reduce this risk.    All opioids tend to cause constipation. Drink plenty of water and eat foods that have a lot of fiber, such as fruits, vegetables, prune juice, apple juice and high-fiber  cereal. Take a laxative (Miralax, milk of magnesia, Colace, Senna) if you don t move your bowels at least every other day. Other side effects include upset stomach, sleepiness, dizziness, throwing up, tolerance (needing more of the medicine to have the same effect), physical dependence and slowed breathing.    Store your pills in a secure place, locked if possible. We will not replace any lost or stolen medicine. If you don t finish your medicine, please throw away (dispose) as directed by your pharmacist. The [place] Pollution Control Agency has more information about safe disposal: https://www.pca.[facility].us/living-green/managing-unwanted-medications             Medication List: This is a list of all your medications and when to take them. Check marks below indicate your daily home schedule. Keep this list as a reference.      Medications           Morning Afternoon Evening Bedtime As Needed    ALPRAZolam 0.25 MG tablet   Commonly known as:  XANAX   1-2 q 6 hours as needed                                amoxicillin-clavulanate 500-125 MG per tablet   Commonly known as:  AUGMENTIN   Take 1 tablet by mouth 2 times daily for 7 days                                carvedilol 40 MG 24 hr capsule   Commonly known as:  COREG CR   TAKE ONE CAPSULE BY MOUTH ONCE DAILY                                * celeXA 20 MG tablet   Take 20 mg by mouth daily   Generic drug:  citalopram                                * citalopram 20 MG tablet   Commonly known as:  celeXA   TAKE ONE TABLET BY MOUTH ONCE DAILY                                hydrALAZINE 25 MG tablet   Commonly known as:  APRESOLINE   Take one twice a day for high blood pressure                                HYDROcodone-acetaminophen 5-325 MG per tablet   Commonly known as:  NORCO   Take 1 tablet by mouth every 4 hours as needed for pain                                KLOR-CON 10 MEQ tablet   daily oral   Generic drug:  potassium chloride                                 losartan-hydrochlorothiazide 100-25 MG per tablet   Commonly known as:  HYZAAR   Take 1 tablet by mouth daily                                Multi-vitamin Tabs tablet   1 TABLET DAILY   Generic drug:  multivitamin, therapeutic with minerals                                ondansetron 8 MG tablet   Commonly known as:  ZOFRAN   Take 1 tablet (8 mg) by mouth every 8 hours as needed for nausea                                potassium chloride SA 10 MEQ CR tablet   Commonly known as:  K-DUR/KLOR-CON M   Take 1 tablet (10 mEq) by mouth daily                                traMADol 50 MG tablet   Commonly known as:  ULTRAM   Take 1 tablet (50 mg) by mouth every 6 hours as needed for severe pain                                * Notice:  This list has 2 medication(s) that are the same as other medications prescribed for you. Read the directions carefully, and ask your doctor or other care provider to review them with you.

## 2018-10-09 NOTE — ANESTHESIA PREPROCEDURE EVALUATION
Anesthesia Evaluation     .             ROS/MED HX    ENT/Pulmonary:  - neg pulmonary ROS   (+)mild COPD, , . .    Neurologic:  - neg neurologic ROS     Cardiovascular:  - neg cardiovascular ROS   (+) hypertension----. : . . . :. .       METS/Exercise Tolerance:     Hematologic:  - neg hematologic  ROS       Musculoskeletal:  - neg musculoskeletal ROS       GI/Hepatic:  - neg GI/hepatic ROS       Renal/Genitourinary:  - ROS Renal section negative       Endo:  - neg endo ROS   (+) Obesity, .      Psychiatric:  - neg psychiatric ROS   (+) psychiatric history anxiety      Infectious Disease:  - neg infectious disease ROS       Malignancy:      - no malignancy   Other:    - neg other ROS                 Physical Exam  Normal systems: cardiovascular, pulmonary and dental    Airway   Mallampati: II  TM distance: >3 FB  Neck ROM: full    Dental     Cardiovascular       Pulmonary                     Anesthesia Plan      History & Physical Review  History and physical reviewed and following examination; no interval change.    ASA Status:  2 .    NPO Status:  > 8 hours    Plan for General and ETT with Intravenous induction. Maintenance will be Balanced.    PONV prophylaxis:  Ondansetron (or other 5HT-3) and Dexamethasone or Solumedrol       Postoperative Care  Postoperative pain management:  IV analgesics, Oral pain medications and Multi-modal analgesia.      Consents  Anesthetic plan, risks, benefits and alternatives discussed with:  Patient..                          .

## 2018-10-09 NOTE — DISCHARGE INSTRUCTIONS
[facility]  Same-Day Surgery   Adult Discharge Orders & Instructions   For 24 hours after surgery  1. Get plenty of rest.  A responsible adult must stay with you for at least 24 hours after you leave the hospital.   2. Do not drive or use heavy equipment.  If you have weakness or tingling, don't drive or use heavy equipment until this feeling goes away.  3. Do not drink alcohol.  4. Avoid strenuous or risky activities.  Ask for help when climbing stairs.   5. You may feel lightheaded.  IF so, sit for a few minutes before standing.  Have someone help you get up.   6. If you have nausea (feel sick to your stomach): Drink only clear liquids such as apple juice, ginger ale, broth or 7-Up.  Rest may also help.  Be sure to drink enough fluids.  Move to a regular diet as you feel able.  7. You may have a slight fever. Call the doctor if your fever is over 100 F (37.7 C) (taken under the tongue) or lasts longer than 24 hours.  8. You may have a dry mouth, a sore throat, muscle aches or trouble sleeping.  These should go away after 24 hours.  9. Do not make important or legal decisions.   Call your doctor for any of the followin.  Signs of infection (fever, growing tenderness at the surgery site, a large amount of drainage or bleeding, severe pain, foul-smelling drainage, redness, swelling).    2. It has been over 8 to 10 hours since surgery and you are still not able to urinate (pass water).    3.  Headache for over 24 hours.       To contact Dr Yoon call:  [phone]    Tylenol was given at 7 :00 AM    Instructions for Septoplasty    Recovery - Everyone recovers differently from a general anesthetic.  Symptoms such as fatigue, nausea, lightheadedness, and sometimes a low grade fever (up to 100 degrees) are not unusual.  As your body removes the anesthetic drugs from circulation, these symptoms will resolve.  Your nose will be sore and throbbing after surgery, and you may even have symptoms  similar to a sinus infection with headache, congestion, and pressure.  These will resolve with healing.  For several days you may experience bloody drainage from the nose, please use the drip pad as necessary for this.  If there is persistent bleeding, please call the office during business hours or the on call ENT physician after hours.  It is common for the front teeth to ache after septoplasty and sinus surgery.  This resolves with healing.  There are no diet restrictions after septoplasty, and you can resume your home medications.  Please blow your nose very very gently for two weeks after surgery.  Limit your activity to no strenuous activities until I see you for the first follow up visit, and sleep with your head elevated.    Medications - You were sent home with narcotic pain medication.  If you can tolerate the discomfort during your recovery by using just plain Tylenol or ibuprofen (advil), please do so.  However, do not hesitate to use the stronger pain medication if needed.  If you were sent home with an antibiotic, it is primarily used to help the healing process.  If it causes loose bowel movements or other signs of intolerance, it is appropriate to discontinue it.      Complications - Problems related to septoplasty almost always are detected during the operation, and special instruction will be given in that situation.  However, unexpected things can happen.  If you experience persistent bleeding, fevers, changes in vision, and severe headaches, this may be the sign of an infection.  Any of these symptoms should be called into my office or to the on call ENT if after hours.   The most common non-emergency distant complication of septoplasty is the septum healing crooked.  Although rare, this can happen.  There may be small plastic pieces placed inside your nose during surgery (splints).  These help to promote the septum into healing in its straightened position, and will be removed at the follow up  visit.    Follow up - As mentioned, splints may be removed at the follow up visit.  This is not as bad as it sounds.  And afterwards, the improvement you will expereince in breathing from the septoplasty is usually dramatic and immediate.  I will then see you 4 to 6 weeks after that visit to make sure that everything has healed appropriately.    If there are any questions or issues with the above, or if there are other issues that concern you, always feel free to call the clinic and I am happy to speak with you as soon as I can.    Brandon Almaraz MD  Otolaryngology  [facility]  [phone] After hours, [facility] option      Instructions for Sinus Surgery    Recovery - Everyone recovers differently from a general anesthetic.  Symptoms such as fatigue, nausea, light-headedness, and sometimes a low grade fever (up to 100 degrees) are not unusual.  As your body removes the anesthetic drugs from circulation, these symptoms will resolve.  Your nose will be sore after surgery, and you may even have symptoms similar to a sinus infection with headache, congestion, and pressure.  These will resolve with healing.  For several days you may experience bloody drainage from the nose, please use the drip pad as necessary for this.  If there is persistent bleeding, please call the office during business hours or the on call ENT physician after hours.  There are no diet restrictions after sinus surgery, and you can resume your home medications.  Please blow your nose very gently for two weeks after surgery.  Limit your activity to no strenuous activities until I see you for the first follow-up visit.      Medications - You were sent home with narcotic pain medication.  If you can tolerate the discomfort during your recovery by using just plain Tylenol or ibuprofen (advil), please do so.  However, do not hesitate to use the stronger pain medication if needed.  If you were sent home with an antibiotic, it is  primarily used to help the healing process.  If it causes loose bowel movements or other signs of intolerance, it is appropriate to discontinue it.  By far the most important measure you can take to speed recovery, and maximize the chances of long term success of sinus surgery is using the sinus rinses at least three time per day for the first month after surgery.   Please start these:     Tomorrow    Complications - Problems related to sinus surgery almost always are detected during the operation, and special instruction will be given in that situation.  However, unexpected things can happen, and are all related to the structures around the sinus cavities.  Symptoms that should alert you to a possible problem include: severe eye pain or eye swelling, persistent heavy bleeding from the nose, and high fevers with headache and neck pain.  Any of these symptoms should be called into my office or to the on call ENT if after hours.  The most common non-emergency complication of sinus surgery is the formation of scar tissue which can re-block the sinuses.  This is addressed below.    Follow-up -  As you have noted, there are quite a few follow-up visits after sinus surgery.  This is done to aggressively manage the most common complication of this technique, which is scar tissue blocking the sinuses.  These visits will require the examination of your nose and possibly removal of crusts of dry mucous and blood, with possible removal of early scar tissue.  Please prepare for these visits by using your sinus rinses.    If there are any questions or issues with the above, or if there are other issues that concern you, always feel free to call the clinic and I am happy to speak with you as soon as I can.    Brandon Almaraz MD  Otolaryngology  [facility]  [phone] After hours, [facility] option

## 2018-10-11 ENCOUNTER — OFFICE VISIT (OUTPATIENT)
Dept: OTOLARYNGOLOGY | Facility: CLINIC | Age: 53
End: 2018-10-11
Payer: COMMERCIAL

## 2018-10-11 VITALS
HEART RATE: 79 BPM | HEIGHT: 61 IN | BODY MASS INDEX: 36.82 KG/M2 | OXYGEN SATURATION: 100 % | SYSTOLIC BLOOD PRESSURE: 158 MMHG | WEIGHT: 195 LBS | DIASTOLIC BLOOD PRESSURE: 85 MMHG

## 2018-10-11 DIAGNOSIS — J33.9 NASAL POLYPOSIS: ICD-10-CM

## 2018-10-11 DIAGNOSIS — J32.4 CHRONIC PANSINUSITIS: Primary | ICD-10-CM

## 2018-10-11 LAB — COPATH REPORT: NORMAL

## 2018-10-11 PROCEDURE — 99213 OFFICE O/P EST LOW 20 MIN: CPT | Performed by: OTOLARYNGOLOGY

## 2018-10-11 NOTE — LETTER
10/11/2018         RE: Juanita Lechuga  [address]  [place] [address]        Dear Colleague,    Thank you for referring your patient, Juanita Lechuga, to the [facility]. Please see a copy of my visit note below.    HPI - Juanita Lechuga is a 52 year old female who is here for their first postoperative visit, status post endoscopic sinus surgery on 10/9/2018, with the finding of extensive polyposis.  More than expected based on the CT scan.  They report the expected amount of congestion, facial pressure, and mild bloody drainage.  No changes in vision, no fevers or chills.  Nasal saline rinses and oral antibiotics have been tolerated.    Physical Exam  B/P: Data Unavailable, Temperature: Data Unavailable, Pulse: Data Unavailable, Respirations: Data Unavailable  General - The patient is well nourished and well developed, and appears to have good nutritional status.  Alert and oriented to person and place, answers questions and cooperates with examination appropriately.   Head and Face - Normocephalic and atraumatic, with no gross asymmetry noted of the contour of the facial features.  The facial nerve is intact, with strong symmetric movements.  Eyes - Extraocular movements intact, and the pupils were reactive to light.  Sclera were not icteric or injected, conjunctiva were pink and moist.  Mouth - Examination of the oral cavity shows pink, healthy, moist mucosa.  No lesions or ulceration noted.  The dentition are in good repair.  The tongue is mobile and midline.  Nose - Nasal exam performed with a headlight and nasal speculum.  I suctioned out a small amount of residual nasopore and dark mucous.  The middle meatus was visible and open bilaterally, no purulence or signs of early synechiae formation.    A/P - Juanita Lechuga is a 52 year old female is doing well from sinus surgery.  There are no signs of complications or infection.  Patient instructed to continue saline rinses.  I will see  them in 1 week for endoscopically assisted debridement of the sinuses.      Again, thank you for allowing me to participate in the care of your patient.        Sincerely,        Micky Almaraz MD

## 2018-10-11 NOTE — PROGRESS NOTES
HPI - Juanita Lechuga is a 52 year old female who is here for their first postoperative visit, status post endoscopic sinus surgery on 10/9/2018, with the finding of extensive polyposis.  More than expected based on the CT scan.  They report the expected amount of congestion, facial pressure, and mild bloody drainage.  No changes in vision, no fevers or chills.  Nasal saline rinses and oral antibiotics have been tolerated.    Physical Exam  B/P: Data Unavailable, Temperature: Data Unavailable, Pulse: Data Unavailable, Respirations: Data Unavailable  General - The patient is well nourished and well developed, and appears to have good nutritional status.  Alert and oriented to person and place, answers questions and cooperates with examination appropriately.   Head and Face - Normocephalic and atraumatic, with no gross asymmetry noted of the contour of the facial features.  The facial nerve is intact, with strong symmetric movements.  Eyes - Extraocular movements intact, and the pupils were reactive to light.  Sclera were not icteric or injected, conjunctiva were pink and moist.  Mouth - Examination of the oral cavity shows pink, healthy, moist mucosa.  No lesions or ulceration noted.  The dentition are in good repair.  The tongue is mobile and midline.  Nose - Nasal exam performed with a headlight and nasal speculum.  I suctioned out a small amount of residual nasopore and dark mucous.  The middle meatus was visible and open bilaterally, no purulence or signs of early synechiae formation.    A/P - Juanita Lechuga is a 52 year old female is doing well from sinus surgery.  There are no signs of complications or infection.  Patient instructed to continue saline rinses.  I will see them in 1 week for endoscopically assisted debridement of the sinuses.

## 2018-10-11 NOTE — PATIENT INSTRUCTIONS
Scheduling Information  To schedule your CT/MRI scan, please contact [facility] at [phone] OR [facility] at [phone]    To schedule your Surgery, please contact our Specialty Schedulers at [phone]      ENT Clinic Locations Clinic Hours Telephone Number     [facility]  [address] [place] [address]   Monday:           1:00pm -- 5:00pm    Friday:              8:00am - 12:00pm   To schedule/reschedule an appointment with   Dr. Almaraz,   please contact our   Specialty Scheduling Department at:     [phone]       [facility]  [address] [place] [address] Tuesday:          8:00am -- 2:00pm         Urgent Care Locations Clinic Hours Telephone Numbers     [facility]  [address] [place] [address]     Monday-Friday:     11:00am - 9:00pm    Saturday-Sunday:  9:00am - 5:00pm   [phone]     [facility]  [address] [place] [address]     Monday-Friday:      5:00pm - 9:00pm     Saturday-Sunday:  9:00am - 5:00pm   [phone]

## 2018-10-11 NOTE — MR AVS SNAPSHOT
After Visit Summary   10/11/2018    Juanita Lechuga    MRN: [id number]           Patient Information     Date Of Birth          1965        Visit Information        Provider Department      10/11/2018 4:30 PM Micky Almaraz MD [facility]        Today's Diagnoses     Chronic pansinusitis    -  1    Nasal polyposis          Care Instructions    Scheduling Information  To schedule your CT/MRI scan, please contact [facility] at [phone] OR [facility] at [phone]    To schedule your Surgery, please contact our Specialty Schedulers at [phone]      ENT Clinic Locations Clinic Hours Telephone Number     [facility]  [address]  [place] [address]   Monday:           1:00pm -- 5:00pm    Friday:              8:00am - 12:00pm   To schedule/reschedule an appointment with   Dr. Almaraz,   please contact our   Specialty Scheduling Department at:     [phone]       [facility]  [address] [place] [address] Tuesday:          8:00am -- 2:00pm         Urgent Care Locations Clinic Hours Telephone Numbers     [facility] [place]  [address] [place] [address]     Monday-Friday:     11:00am - 9:00pm    Saturday-Sunday:  9:00am - 5:00pm   [phone]     [facility]  [address] [address]     Monday-Friday:      5:00pm - 9:00pm     Saturday-Sunday:  9:00am - 5:00pm   [phone]                 Follow-ups after your visit        Your next 10 appointments already scheduled     Oct 29, 2018  5:30 PM CDT   Post Op with Micky Almaraz MD   [facility] [place] ([facility])    [address] [place], [place] [address] [address] [phone]              Who to contact     If you have questions or need follow up information about today's clinic visit or your schedule please contact [facility] directly at [phone].  Normal or non-critical lab and imaging  "results will be communicated to you by MyChart, letter or phone within 4 business days after the clinic has received the results. If you do not hear from us within 7 days, please contact the clinic through [employer] or phone. If you have a critical or abnormal lab result, we will notify you by phone as soon as possible.  Submit refill requests through [employer] or call your pharmacy and they will forward the refill request to us. Please allow 3 business days for your refill to be completed.          Additional Information About Your Visit        [employer] Information     [employer] gives you secure access to your electronic health record. If you see a primary care provider, you can also send messages to your care team and make appointments. If you have questions, please call your primary care clinic.  If you do not have a primary care provider, please call [phone] and they will assist you.        Care EveryWhere ID     This is your Care EveryWhere ID. This could be used by other organizations to access your [place] medical records  [id number]        Your Vitals Were     Pulse Height Last Period Pulse Oximetry BMI (Body Mass Index)       79 1.549 m (5' 1\") 12/28/2015 (Approximate) 100% 36.84 kg/m2        Blood Pressure from Last 3 Encounters:   10/11/18 158/85   10/09/18 147/81   10/01/18 130/60    Weight from Last 3 Encounters:   10/11/18 88.5 kg (195 lb)   10/01/18 88.7 kg (195 lb 9.6 oz)   08/31/18 87.5 kg (193 lb)              Today, you had the following     No orders found for display       Primary Care Provider Office Phone # Fax #    Farida Cardona [phone] [address] [address]        Equal Access to Services     [facility] : Jun Hoover, margie gruber, anika kaalmada dung, loree strong. So [facility] [phone].    ATENCIÓN: Si habla español, tiene a hudson disposición servicios gratuitos de asistencia " lingüística. Wiliam al [phone].    We comply with applicable federal civil rights laws and [place] laws. We do not discriminate on the basis of race, color, national origin, age, disability, sex, sexual orientation, or gender identity.            Thank you!     [facility] you [facility]  [facility]. Our goal is always to provide you with excellent care. Hearing back from our patients is one way we can continue to improve our services. Please take a few minutes to complete the written survey that you may receive in the mail after your visit with us. Thank you!             Your Updated Medication List - Protect others around you: Learn how to safely use, store and throw away your medicines at www.disposemymeds.org.          This list is accurate as of 10/11/18  5:07 PM.  Always use your most recent med list.                   Brand Name Dispense Instructions for use Diagnosis    ALPRAZolam 0.25 MG tablet    XANAX    10 tablet    1-2 q 6 hours as needed    Anxiety       amoxicillin-clavulanate 500-125 MG per tablet    AUGMENTIN    14 tablet    Take 1 tablet by mouth 2 times daily for 7 days    Chronic pansinusitis       carvedilol 40 MG 24 hr capsule    COREG CR    90 capsule    TAKE ONE CAPSULE BY MOUTH ONCE DAILY    Benign essential hypertension       * celeXA 20 MG tablet   Generic drug:  citalopram      Take 20 mg by mouth daily        * citalopram 20 MG tablet    celeXA    90 tablet    TAKE ONE TABLET BY MOUTH ONCE DAILY    Situational mixed anxiety and depressive disorder, Anxiety       hydrALAZINE 25 MG tablet    APRESOLINE    180 tablet    Take one twice a day for high blood pressure    Benign essential hypertension       HYDROcodone-acetaminophen 5-325 MG per tablet    NORCO    40 tablet    Take 1 tablet by mouth every 4 hours as needed for pain    Chronic pansinusitis, Acute post-operative pain       KLOR-CON 10 MEQ tablet   Generic drug:  potassium chloride      daily oral         losartan-hydrochlorothiazide 100-25 MG per tablet    HYZAAR    90 tablet    Take 1 tablet by mouth daily    Benign essential hypertension       Multi-vitamin Tabs tablet   Generic drug:  multivitamin, therapeutic with minerals      1 TABLET DAILY    Knee pain       ondansetron 8 MG tablet    ZOFRAN    10 tablet    Take 1 tablet (8 mg) by mouth every 8 hours as needed for nausea    Chronic pansinusitis, Acute post-operative pain       potassium chloride SA 10 MEQ CR tablet    K-DUR/KLOR-CON M    90 tablet    Take 1 tablet (10 mEq) by mouth daily    Hypokalemia       traMADol 50 MG tablet    ULTRAM    50 tablet    Take 1 tablet (50 mg) by mouth every 6 hours as needed for severe pain    Chronic pansinusitis, Deviated nasal septum       * Notice:  This list has 2 medication(s) that are the same as other medications prescribed for you. Read the directions carefully, and ask your doctor or other care provider to review them with you.

## 2018-10-22 DIAGNOSIS — E87.6 HYPOKALEMIA: ICD-10-CM

## 2018-10-22 RX ORDER — POTASSIUM CHLORIDE 750 MG/1
TABLET, EXTENDED RELEASE ORAL
Qty: 30 TABLET | Refills: 1
Start: 2018-10-22

## 2018-10-22 NOTE — TELEPHONE ENCOUNTER
Spoke to pharmacy and confirmed duplicate request.    potassium chloride SA (K-DUR/KLOR-CON M) 10 MEQ CR tablet 90 tablet 4 10/1/2018  No      Sig - Route: Take 1 tablet (10 mEq) by mouth daily - Oral     Class: E-Prescribe     Order: [id number]     E-Prescribing Status: Receipt confirmed by pharmacy (10/1/2018 10:07 AM CDT)       Misty HINOJOSA CMA ([facility])

## 2018-10-29 ENCOUNTER — OFFICE VISIT (OUTPATIENT)
Dept: OTOLARYNGOLOGY | Facility: CLINIC | Age: 53
End: 2018-10-29
Payer: COMMERCIAL

## 2018-10-29 VITALS
OXYGEN SATURATION: 96 % | BODY MASS INDEX: 37.76 KG/M2 | DIASTOLIC BLOOD PRESSURE: 80 MMHG | RESPIRATION RATE: 12 BRPM | WEIGHT: 200 LBS | HEART RATE: 96 BPM | SYSTOLIC BLOOD PRESSURE: 161 MMHG | HEIGHT: 61 IN

## 2018-10-29 DIAGNOSIS — J32.4 CHRONIC PANSINUSITIS: Primary | ICD-10-CM

## 2018-10-29 DIAGNOSIS — J33.9 NASAL POLYPOSIS: ICD-10-CM

## 2018-10-29 PROCEDURE — 31237 NSL/SINS NDSC SURG BX POLYPC: CPT | Mod: 50 | Performed by: OTOLARYNGOLOGY

## 2018-10-29 PROCEDURE — 99213 OFFICE O/P EST LOW 20 MIN: CPT | Mod: 24 | Performed by: OTOLARYNGOLOGY

## 2018-10-29 NOTE — NURSING NOTE
Patient to follow up with Primary Care provider regarding elevated blood pressure.  Patient is aware of her high BP, currently taking BP meds but needs to discuss a change with her primary

## 2018-10-29 NOTE — LETTER
10/29/2018         RE: Juanita Lechuga  [address]  [place] [address]        Dear Colleague,    Thank you for referring your patient, Juanita Lechuga, to the [facility]. Please see a copy of my visit note below.    Post Op Sinus 2    HPI - Juanita Lechuga is here for their second postoperative visit, status post endoscopic sinus surgery on 10/9/2018, with the finding of extensive polyposis.  More than expected based on the CT scan.  There was the expected amount of congestion which has now mostly resolved, and no bleeding has occurred.  The generalized facial pressure has been resolving, and there have been no fevers or chills since the first postoperative visit.  The sinus rinses are continuing three times per day, and are being tolerated well.  No visual changes.    Physical Exam -   B/P: Data Unavailable, Temperature: Data Unavailable, Pulse: Data Unavailable, Respirations: Data Unavailable   General - The patient is awake and alert, and answers questions appropriately during the history and physical.  The vocal quality is hypernasal, but there is no dyspnea or stridor noted.  Eyes - The EOMI, there is no conjuncitval or scleral injection.  Pupils are equally round and reactive to light.  Oral - The oral mucosa is pink and moist.  The tongue is mobile and midline on protrusion, no edema noted.  Nasal - The nasal examination was done with a rigid nasal endoscope today for the purposes of bilateral endoscopically assisted debridement of the sinuses.  I began by spraying both sides with lidocaine and neosynephrine.  Color photographs were taken for the medical record.  I began on the left side.  The middle meatus was noted to obstructed with a dark crust, this was gently dislodged from the lateral wall with a number 7 suction, I was then able to visualize the left maxillary sinusotomy, it is healing well and open.  No purulence noted.  I then moved further back, and debrided some dark  crusts and thick dark mucous away from the ethmoid roof.  The roof was then visualized and is healing well.  I turned my attention to the right side.  Once again some dark crust was dislodged from the middle meatus and removed from the nose.  I was then able to pass the scope into the right middle meatus.  The maxillary sinusotomy is healing well, no abnormal secretions noted.  Further back, I debrided some crusts from the lateral wall and roof of the ethmoid system.  I was then able to visualize a nicely remucosalizing surface.  Bialterally, no early synechiae or touch points were noted.    A/P - Juanita Lechuga is status post endoscopic nasal surgery and does not show any signs of complications.  I will see the patient back in one month for the third postoperative visit.  They have no restrictions at this point, and should call if there are any signs of sinusitis.            Again, thank you for allowing me to participate in the care of your patient.        Sincerely,        Micky Almaraz MD

## 2018-10-29 NOTE — MR AVS SNAPSHOT
After Visit Summary   10/29/2018    Juanita Lechuga    MRN: [id number]           Patient Information     Date Of Birth          1965        Visit Information        Provider Department      10/29/2018 5:30 PM Micky Almaraz MD [facility]        Today's Diagnoses     Chronic pansinusitis    -  1    Nasal polyposis           Follow-ups after your visit        Your next 10 appointments already scheduled     Nov 26, 2018  5:00 PM CST   Post Op with Micky Almaraz MD   [facility] ([facility]    [address] [place] [id number] [phone]              Who to contact     If you have questions or need follow up information about today's clinic visit or your schedule please contact [facility] directly at [phone].  Normal or non-critical lab and imaging results will be communicated to you by MyChart, letter or phone within 4 business days after the clinic has received the results. If you do not hear from us within 7 days, please contact the clinic through MyChart or phone. If you have a critical or abnormal lab result, we will notify you by phone as soon as possible.  Submit refill requests through [facility] or call your pharmacy and they will forward the refill request to us. Please allow 3 business days for your refill to be completed.          Additional Information About Your Visit        MyChart Information     [facility] gives you secure access to your electronic health record. If you see a primary care provider, you can also send messages to your care team and make appointments. If you have questions, please call your primary care clinic.  If you do not have a primary care provider, please call [phone] and they will assist you.        Care EveryWhere ID     This is your Care EveryWhere ID. This could be used by other organizations to access your [place] medical records  [id number]        Your Vitals Were   "   Pulse Respirations Height Last Period Pulse Oximetry BMI (Body Mass Index)    96 12 1.549 m (5' 1\") 12/28/2015 (Approximate) 96% 37.79 kg/m2       Blood Pressure from Last 3 Encounters:   10/29/18 161/80   10/11/18 158/85   10/09/18 147/81    Weight from Last 3 Encounters:   10/29/18 90.7 kg (200 lb)   10/11/18 88.5 kg (195 lb)   10/01/18 88.7 kg (195 lb 9.6 oz)              We Performed the Following     NASAL/SINUS SCOPE W BIOPSY POLYPECT OR DEBRIDEMENT        Primary Care Provider Office Phone # Fax #    Farida Cardona [phone] [address] [facility] [address]        Equal Access to Services     [facility]: Hadii aad ku hadasho Soomaali, waaxda luqadaha, qaybta kaalmada ademarquezyaminnie, loree monet . So [facility] [phone].    ATENCIÓN: Si habla español, tiene a hudson disposición servicios gratuitos de asistencia lingüística. Wiliam al [phone].    We comply with applicable federal civil rights laws and [place] laws. We do not discriminate on the basis of race, color, national origin, age, disability, sex, sexual orientation, or gender identity.            Thank you!     Thank you for choosing [facility]  for your care. Our goal is always to provide you with excellent care. Hearing back from our patients is one way we can continue to improve our services. Please take a few minutes to complete the written survey that you may receive in the mail after your visit with us. Thank you!             Your Updated Medication List - Protect others around you: Learn how to safely use, store and throw away your medicines at www.disposemymeds.org.          This list is accurate as of 10/29/18 11:59 PM.  Always use your most recent med list.                   Brand Name Dispense Instructions for use Diagnosis    ALPRAZolam 0.25 MG tablet    XANAX    10 tablet    1-2 q 6 hours as needed    Anxiety       carvedilol 40 MG 24 hr capsule    " COREG CR    90 capsule    TAKE ONE CAPSULE BY MOUTH ONCE DAILY    Benign essential hypertension       * celeXA 20 MG tablet   Generic drug:  citalopram      Take 20 mg by mouth daily        * citalopram 20 MG tablet    celeXA    90 tablet    TAKE ONE TABLET BY MOUTH ONCE DAILY    Situational mixed anxiety and depressive disorder, Anxiety       hydrALAZINE 25 MG tablet    APRESOLINE    180 tablet    Take one twice a day for high blood pressure    Benign essential hypertension       HYDROcodone-acetaminophen 5-325 MG per tablet    NORCO    40 tablet    Take 1 tablet by mouth every 4 hours as needed for pain    Chronic pansinusitis, Acute post-operative pain       KLOR-CON 10 MEQ tablet   Generic drug:  potassium chloride      daily oral        losartan-hydrochlorothiazide 100-25 MG per tablet    HYZAAR    90 tablet    Take 1 tablet by mouth daily    Benign essential hypertension       Multi-vitamin Tabs tablet   Generic drug:  multivitamin, therapeutic with minerals      1 TABLET DAILY    Knee pain       ondansetron 8 MG tablet    ZOFRAN    10 tablet    Take 1 tablet (8 mg) by mouth every 8 hours as needed for nausea    Chronic pansinusitis, Acute post-operative pain       potassium chloride SA 10 MEQ CR tablet    K-DUR/KLOR-CON M    90 tablet    Take 1 tablet (10 mEq) by mouth daily    Hypokalemia       traMADol 50 MG tablet    ULTRAM    50 tablet    Take 1 tablet (50 mg) by mouth every 6 hours as needed for severe pain    Chronic pansinusitis, Deviated nasal septum       * Notice:  This list has 2 medication(s) that are the same as other medications prescribed for you. Read the directions carefully, and ask your doctor or other care provider to review them with you.

## 2018-10-29 NOTE — PROGRESS NOTES
Post Op Sinus 2    HPI - Juanita Lechuga is here for their second postoperative visit, status post endoscopic sinus surgery on 10/9/2018, with the finding of extensive polyposis.  More than expected based on the CT scan.  There was the expected amount of congestion which has now mostly resolved, and no bleeding has occurred.  The generalized facial pressure has been resolving, and there have been no fevers or chills since the first postoperative visit.  The sinus rinses are continuing three times per day, and are being tolerated well.  No visual changes.    Physical Exam -   B/P: Data Unavailable, Temperature: Data Unavailable, Pulse: Data Unavailable, Respirations: Data Unavailable   General - The patient is awake and alert, and answers questions appropriately during the history and physical.  The vocal quality is hypernasal, but there is no dyspnea or stridor noted.  Eyes - The EOMI, there is no conjuncitval or scleral injection.  Pupils are equally round and reactive to light.  Oral - The oral mucosa is pink and moist.  The tongue is mobile and midline on protrusion, no edema noted.  Nasal - The nasal examination was done with a rigid nasal endoscope today for the purposes of bilateral endoscopically assisted debridement of the sinuses.  I began by spraying both sides with lidocaine and neosynephrine.  Color photographs were taken for the medical record.  I began on the left side.  The middle meatus was noted to obstructed with a dark crust, this was gently dislodged from the lateral wall with a number 7 suction, I was then able to visualize the left maxillary sinusotomy, it is healing well and open.  No purulence noted.  I then moved further back, and debrided some dark crusts and thick dark mucous away from the ethmoid roof.  The roof was then visualized and is healing well.  I turned my attention to the right side.  Once again some dark crust was dislodged from the middle meatus and removed from the nose.  I  was then able to pass the scope into the right middle meatus.  The maxillary sinusotomy is healing well, no abnormal secretions noted.  Further back, I debrided some crusts from the lateral wall and roof of the ethmoid system.  I was then able to visualize a nicely remucosalizing surface.  Bialterally, no early synechiae or touch points were noted.    A/P - Juanita Lechuga is status post endoscopic nasal surgery and does not show any signs of complications.  I will see the patient back in one month for the third postoperative visit.  They have no restrictions at this point, and should call if there are any signs of sinusitis.

## 2018-11-26 ENCOUNTER — MYC MEDICAL ADVICE (OUTPATIENT)
Dept: FAMILY MEDICINE | Facility: CLINIC | Age: 53
End: 2018-11-26

## 2018-11-26 ENCOUNTER — OFFICE VISIT (OUTPATIENT)
Dept: OTOLARYNGOLOGY | Facility: CLINIC | Age: 53
End: 2018-11-26
Payer: COMMERCIAL

## 2018-11-26 ENCOUNTER — MYC MEDICAL ADVICE (OUTPATIENT)
Dept: OTOLARYNGOLOGY | Facility: CLINIC | Age: 53
End: 2018-11-26

## 2018-11-26 VITALS
OXYGEN SATURATION: 98 % | SYSTOLIC BLOOD PRESSURE: 155 MMHG | BODY MASS INDEX: 37.95 KG/M2 | WEIGHT: 201 LBS | DIASTOLIC BLOOD PRESSURE: 74 MMHG | HEIGHT: 61 IN | RESPIRATION RATE: 12 BRPM | HEART RATE: 78 BPM

## 2018-11-26 DIAGNOSIS — J33.9 NASAL POLYPOSIS: ICD-10-CM

## 2018-11-26 DIAGNOSIS — I10 BENIGN ESSENTIAL HYPERTENSION: ICD-10-CM

## 2018-11-26 DIAGNOSIS — J32.4 CHRONIC PANSINUSITIS: Primary | ICD-10-CM

## 2018-11-26 PROCEDURE — 31231 NASAL ENDOSCOPY DX: CPT | Mod: 58 | Performed by: OTOLARYNGOLOGY

## 2018-11-26 PROCEDURE — 99024 POSTOP FOLLOW-UP VISIT: CPT | Performed by: OTOLARYNGOLOGY

## 2018-11-26 RX ORDER — FLUTICASONE PROPIONATE 50 MCG
2 SPRAY, SUSPENSION (ML) NASAL DAILY
Qty: 1 BOTTLE | Refills: 3 | Status: SHIPPED | OUTPATIENT
Start: 2018-11-26 | End: 2019-08-16

## 2018-11-26 NOTE — PATIENT INSTRUCTIONS
Scheduling Information  To schedule your CT/MRI scan, please contact [facility] at [phone] OR [facility] at [phone]    To schedule your Surgery, please contact our Specialty Schedulers at [phone]      ENT Clinic Locations Clinic Hours Telephone Number     [facility]  [address] [place] [address]   Monday:           1:00pm -- 5:00pm    Friday:              8:00am - 12:00pm   To schedule/reschedule an appointment with   Dr. Almaraz,   please contact our   Specialty Scheduling Department at:     [phone]       [facility]  [address] [place] [address] Tuesday:          8:00am -- 2:00pm         Urgent Care Locations Clinic Hours Telephone Numbers     [facility]  [address] [place] [address]     Monday-Friday:     11:00am - 9:00pm    Saturday-Sunday:  9:00am - 5:00pm   [phone]     [facility]  [address] [place] [address]     Monday-Friday:      5:00pm - 9:00pm     Saturday-Sunday:  9:00am - 5:00pm   [phone]

## 2018-11-26 NOTE — MR AVS SNAPSHOT
After Visit Summary   11/26/2018    Juanita Lechuga    MRN: [id number]           Patient Information     Date Of Birth          1965        Visit Information        Provider Department      11/26/2018 5:00 PM Micky Almaraz MD [facility]        Today's Diagnoses     Chronic pansinusitis    -  1    Nasal polyposis           Follow-ups after your visit        Who to contact     If you have questions or need follow up information about today's clinic visit or your schedule please contact [facility] directly at [phone].  Normal or non-critical lab and imaging results will be communicated to you by SoundBetterhart, letter or phone within 4 business days after the clinic has received the results. If you do not hear from us within 7 days, please contact the clinic through [employer] Mat[employer] or phone. If you have a critical or abnormal lab result, we will notify you by phone as soon as possible.  Submit refill requests through [employer] or call your pharmacy and they will forward the refill request to us. Please allow 3 business days for your refill to be completed.          Additional Information About Your Visit        MyChart Information     [employer] gives you secure access to your electronic health record. If you see a primary care provider, you can also send messages to your care team and make appointments. If you have questions, please call your primary care clinic.  If you do not have a primary care provider, please call [phone] and they will assist you.        Care EveryWhere ID     This is your Care EveryWhere ID. This could be used by other organizations to access your [place] medical records  [id number]        Your Vitals Were     Last Period                   12/28/2015 (Approximate)            Blood Pressure from Last 3 Encounters:   10/29/18 161/80   10/11/18 158/85   10/09/18 147/81    Weight from Last 3 Encounters:   10/29/18 90.7 kg (200 lb)   10/11/18 88.5 kg  (195 lb)   10/01/18 88.7 kg (195 lb 9.6 oz)              We Performed the Following     Nasal Endoscopy, Diag          Today's Medication Changes          These changes are accurate as of 11/26/18  5:17 PM.  If you have any questions, ask your nurse or doctor.               Start taking these medicines.        Dose/Directions    fluticasone 50 MCG/ACT nasal spray   Commonly known as:  FLONASE   Used for:  Chronic pansinusitis, Nasal polyposis        Dose:  2 spray   Spray 2 sprays into both nostrils daily   Quantity:  1 Bottle   Refills:  3            Where to get your medicines      These medications were sent to [facility]  [place], [place] - [address] [address] [facility]  [address] [address] [facility] [address]     Phone:  [phone]     fluticasone 50 MCG/ACT nasal spray                Primary Care Provider Office Phone # Fax #    Farida Cardona [phone] [address] [facility] [address]        Equal Access to Services     [facility]: Hadii aad ku hadasho Soomaali, waaxda luqadaha, qaybta kaalmada adeegyada, waxay idiin hayvijayn kendrick suarezaramarichuy monet . So [facility] [phone].    ATENCIÓN: Si habla español, tiene a hudson disposición servicios gratuitos de asistencia lingüística. [facility] [phone].    We comply with applicable federal civil rights laws and [place] laws. We do not discriminate on the basis of race, color, national origin, age, disability, sex, sexual orientation, or gender identity.            Thank you!     Thank you for choosing [facility]  for your care. Our goal is always to provide you with excellent care. Hearing back from our patients is one way we can continue to improve our services. Please take a few minutes to complete the written survey that you may receive in the mail after your visit with us. Thank you!             Your Updated Medication List - Protect others around you: Learn how to safely use, store and throw  away your medicines at www.disposemymeds.org.          This list is accurate as of 11/26/18  5:17 PM.  Always use your most recent med list.                   Brand Name Dispense Instructions for use Diagnosis    ALPRAZolam 0.25 MG tablet    XANAX    10 tablet    1-2 q 6 hours as needed    Anxiety       carvedilol 40 MG 24 hr capsule    COREG CR    90 capsule    TAKE ONE CAPSULE BY MOUTH ONCE DAILY    Benign essential hypertension       * celeXA 20 MG tablet   Generic drug:  citalopram      Take 20 mg by mouth daily        * citalopram 20 MG tablet    celeXA    90 tablet    TAKE ONE TABLET BY MOUTH ONCE DAILY    Situational mixed anxiety and depressive disorder, Anxiety       fluticasone 50 MCG/ACT nasal spray    FLONASE    1 Bottle    Spray 2 sprays into both nostrils daily    Chronic pansinusitis, Nasal polyposis       hydrALAZINE 25 MG tablet    APRESOLINE    180 tablet    Take one twice a day for high blood pressure    Benign essential hypertension       HYDROcodone-acetaminophen 5-325 MG tablet    NORCO    40 tablet    Take 1 tablet by mouth every 4 hours as needed for pain    Chronic pansinusitis, Acute post-operative pain       KLOR-CON 10 MEQ tablet   Generic drug:  potassium chloride      daily oral        losartan-hydrochlorothiazide 100-25 MG per tablet    HYZAAR    90 tablet    Take 1 tablet by mouth daily    Benign essential hypertension       Multi-vitamin Tabs tablet   Generic drug:  multivitamin, therapeutic with minerals      1 TABLET DAILY    Knee pain       ondansetron 8 MG tablet    ZOFRAN    10 tablet    Take 1 tablet (8 mg) by mouth every 8 hours as needed for nausea    Chronic pansinusitis, Acute post-operative pain       potassium chloride SA 10 MEQ CR tablet    K-DUR/KLOR-CON M    90 tablet    Take 1 tablet (10 mEq) by mouth daily    Hypokalemia       traMADol 50 MG tablet    ULTRAM    50 tablet    Take 1 tablet (50 mg) by mouth every 6 hours as needed for severe pain    Chronic  pansinusitis, Deviated nasal septum       * Notice:  This list has 2 medication(s) that are the same as other medications prescribed for you. Read the directions carefully, and ask your doctor or other care provider to review them with you.

## 2018-11-26 NOTE — LETTER
11/26/2018         RE: Juanita Lechuga  [address]  [place] [address]        Dear Colleague,    Thank you for referring your patient, Juanita Lechuga, to the [facility]. Please see a copy of my visit note below.    History of Present Illness - Juanita Lechuga is a 53 year old female status post endoscopic sinus surgery on 10/9/2018, with the finding of extensive polyposis.  More than expected based on the CT scan.  Last sen on 10/29/18 for her second post op.    Overall she feels well healed.  She is open and clear, no drainage at all.  However, she does report that there is still an on and off sense of congestion in the morning, as well as the pressure behind her eyes.    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  Alert and oriented to person and place, answers questions and cooperates with examination appropriately.   Head and Face - Normocephalic and atraumatic, with no gross asymmetry noted of the contour of the facial features.  The facial nerve is intact, with strong symmetric movements.  Eyes - Extraocular movements intact, and the pupils were reactive to light.  Sclera were not icteric or injected, conjunctiva were pink and moist.  Mouth - Examination of the oral cavity shows pink, healthy, moist mucosa.  No lesions or  ulceration noted.  The dentition are in good repair.  The tongue is mobile and midline.    To evaluate the nose in the postoperative state I performed rigid nasal endoscopy.  I first sprayed with lidocaine and neosynephrine.  I began with the LEFT side using a 2.7mm 30 degree rigid nasal endoscope, and color photographs were taken for the medical record.    The middle meatus was open, and I was able to pass the scope through.  The LEFT maxillary antrostomy is open, and looking down and into the LEFT maxillary sinus, the mucosa looks healthy, no abnormal secretions.  Going further back, the ethmoid roof is nicely re-mucosalized, and there is no abnormal secretions or polypoid degeneration.    The right side was then examined.  The middle meatus was open and I visualized the RIGHT antrostomy was open.  Looking down into the RIGHT maxillary sinus, the mucosa was globally boggy and edematous.  Going further back the ethmoid system looks good.  The mucosa is healthy, and there were polyps or polypoid degenerations.        A/P - Juanita Lechuga  (J32.4) Chronic pansinusitis  (primary encounter diagnosis)  (J33.9) Nasal polyposis    The sinusotomies are all still clear and open, and there is no sign of any infection.  However, clearly there is still a generalized edema.  I am going to restart the flonase.    Send me a mychart message if not effective after 2-3 weeks, and I will change over to a different nasal steroid, or combine with azelastine, and see if we can get her even more relief.        Again, thank you for allowing me to participate in the care of your patient.        Sincerely,        Micky Almaraz MD

## 2018-11-26 NOTE — PROGRESS NOTES
History of Present Illness - Juanita Lechuga is a 53 year old female status post endoscopic sinus surgery on 10/9/2018, with the finding of extensive polyposis.  More than expected based on the CT scan.  Last sen on 10/29/18 for her second post op.    Overall she feels well healed.  She is open and clear, no drainage at all.  However, she does report that there is still an on and off sense of congestion in the morning, as well as the pressure behind her eyes.    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  Alert and oriented to person and place, answers questions and cooperates with examination appropriately.   Head and Face - Normocephalic and atraumatic, with no gross asymmetry noted of the contour of the facial features.  The facial nerve is intact, with strong symmetric movements.  Eyes - Extraocular movements intact, and the pupils were reactive to light.  Sclera were not icteric or injected, conjunctiva were pink and moist.  Mouth - Examination of the oral cavity shows pink, healthy, moist mucosa.  No lesions or ulceration noted.  The dentition are in good repair.  The tongue is mobile and midline.    To evaluate the nose in the postoperative state I performed rigid nasal endoscopy.  I first sprayed with lidocaine and neosynephrine.  I began with the LEFT side  using a 2.7mm 30 degree rigid nasal endoscope, and color photographs were taken for the medical record.    The middle meatus was open, and I was able to pass the scope through.  The LEFT maxillary antrostomy is open, and looking down and into the LEFT maxillary sinus, the mucosa looks healthy, no abnormal secretions.  Going further back, the ethmoid roof is nicely re-mucosalized, and there is no abnormal secretions or polypoid degeneration.    The right side was then examined.  The middle meatus was open and I visualized the RIGHT antrostomy was open.  Looking down into the RIGHT maxillary sinus, the mucosa was globally boggy and edematous.  Going further back the ethmoid system looks good.  The mucosa is healthy, and there were polyps or polypoid degenerations.        A/P - Juanita Lechuga  (J32.4) Chronic pansinusitis  (primary encounter diagnosis)  (J33.9) Nasal polyposis    The sinusotomies are all still clear and open, and there is no sign of any infection.  However, clearly there is still a generalized edema.  I am going to restart the flonase.    Send me a mychart message if not effective after 2-3 weeks, and I will change over to a different nasal steroid, or combine with azelastine, and see if we can get her even more relief.

## 2018-11-27 RX ORDER — HYDRALAZINE HYDROCHLORIDE 25 MG/1
TABLET, FILM COATED ORAL
Qty: 360 TABLET | Refills: 3 | Status: SHIPPED | OUTPATIENT
Start: 2018-11-27 | End: 2019-04-29

## 2018-12-11 ENCOUNTER — MYC MEDICAL ADVICE (OUTPATIENT)
Dept: OTOLARYNGOLOGY | Facility: CLINIC | Age: 53
End: 2018-12-11

## 2018-12-11 DIAGNOSIS — J31.0 CHRONIC RHINITIS: Primary | ICD-10-CM

## 2018-12-11 DIAGNOSIS — J33.9 NASAL POLYPOSIS: ICD-10-CM

## 2018-12-13 RX ORDER — AZELASTINE HCL 205.5 UG/1
2 SPRAY NASAL 2 TIMES DAILY
Qty: 1 BOTTLE | Refills: 12 | Status: SHIPPED | OUTPATIENT
Start: 2018-12-13 | End: 2020-01-06

## 2019-01-10 ENCOUNTER — TELEPHONE (OUTPATIENT)
Dept: FAMILY MEDICINE | Facility: CLINIC | Age: 54
End: 2019-01-10

## 2019-01-10 ENCOUNTER — MYC MEDICAL ADVICE (OUTPATIENT)
Dept: FAMILY MEDICINE | Facility: CLINIC | Age: 54
End: 2019-01-10

## 2019-01-10 DIAGNOSIS — I10 BENIGN ESSENTIAL HYPERTENSION: ICD-10-CM

## 2019-01-10 RX ORDER — HYDROCHLOROTHIAZIDE 25 MG/1
25 TABLET ORAL DAILY
Qty: 90 TABLET | Refills: 0 | Status: SHIPPED | OUTPATIENT
Start: 2019-01-10 | End: 2019-04-29

## 2019-01-10 RX ORDER — LOSARTAN POTASSIUM AND HYDROCHLOROTHIAZIDE 25; 100 MG/1; MG/1
1 TABLET ORAL DAILY
Qty: 90 TABLET | Refills: 4 | Status: CANCELLED | OUTPATIENT
Start: 2019-01-10

## 2019-01-10 RX ORDER — LOSARTAN POTASSIUM 100 MG/1
100 TABLET ORAL DAILY
Qty: 90 TABLET | Refills: 0 | Status: SHIPPED | OUTPATIENT
Start: 2019-01-10 | End: 2019-04-29

## 2019-01-11 NOTE — TELEPHONE ENCOUNTER
Pharmacy comments: Need for clarification-Supply issues with this combo drug. Please send new RX for separation of drug.    CLAY Quezada

## 2019-01-11 NOTE — TELEPHONE ENCOUNTER
Signed Prescriptions:                        Disp   Refills    losartan (COZAAR) 100 MG tablet            90 tab*0        Sig: Take 1 tablet (100 mg) by mouth daily  Authorizing Provider: NORA ORO    hydrochlorothiazide (HYDRODIURIL) 25 MG ta*90 tab*0        Sig: Take 1 tablet (25 mg) by mouth daily  Authorizing Provider: NORA ORO

## 2019-04-03 ENCOUNTER — TELEPHONE (OUTPATIENT)
Dept: FAMILY MEDICINE | Facility: CLINIC | Age: 54
End: 2019-04-03

## 2019-04-03 NOTE — LETTER
April 3, 2019          Juanita Lechuga,  [address]  [place] [address]        Dear Juanita Lechuga      Monitoring and managing your preventative and chronic health conditions are very important to us. Our records indicate that you have not scheduled for Colonoscopy  which was recommended by Dr. Box.      If you have received your health care elsewhere, please call the clinic so the information can be documented in your chart.    Please call [phone] or message us through your [employer] account to schedule an appointment or provide information for your chart.     Feel free to contact us if you have any questions or concerns!    I look forward to seeing you and working with you on your health care needs.     Sincerely,       Your [place] Care Team/cc

## 2019-04-03 NOTE — TELEPHONE ENCOUNTER
Panel Management Review      Patient has the following on her problem list:     Hypertension   Last three blood pressure readings:  BP Readings from Last 3 Encounters:   11/26/18 155/74   10/29/18 161/80   10/11/18 158/85     Blood pressure: FAILED    HTN Guidelines:  Age 18-59 BP range:  Less than 140/90  Age 60-85 with Diabetes:  Less than 140/90  Age 60-85 without Diabetes:  less than 150/90      Composite cancer screening  Chart review shows that this patient is due/due soon for the following Colonoscopy  Summary:    Patient is due/failing the following:   COLONOSCOPY and PHQ9    Action needed:   Patient needs referral/order: Colonoscopy    Type of outreach:    Sent letter.    Questions for provider review:    None                                                                                                                                    CLAY Quezada       Chart routed to none.

## 2019-04-19 DIAGNOSIS — J01.01 ACUTE RECURRENT MAXILLARY SINUSITIS: ICD-10-CM

## 2019-04-19 NOTE — TELEPHONE ENCOUNTER
Motrin      Last Written Prescription Date:    Last Fill Quantity: ,   # refills:   Last Office Visit:   Future Office visit:    Next 5 appointments (look out 90 days)    Apr 29, 2019 10:40 AM CDT  PHYSICAL with Mable Cardoso MD  [facility] [address]  [place] [address]  [phone]           Routing refill request to provider for review/approval because:  Drug not active on patient's medication list

## 2019-04-19 NOTE — TELEPHONE ENCOUNTER
Voice mail left for patient to call RN hotline at [phone].  Is patient still taking Motrin? What is she taking it for?    Motrin       Last Written Prescription Date:  7/27/18  Last Fill Quantity: 90,   # refills: 1  Last Office Visit: 10/01/18  Future Office visit:    Next 5 appointments (look out 90 days)    Apr 29, 2019 10:40 AM CDT  PHYSICAL with Mable Cardoso MD  [facility] [address] [place] [id number] [phone]           Routing refill request to provider for review/approval because:  Drug not active on patient's medication list- was reported not taking on 10/1/18    Sindhu Gonzalez RN

## 2019-04-19 NOTE — TELEPHONE ENCOUNTER
Pt called RN hotline. States she started taking motrin 2 days ago for her sinuses. She had some medication left so used this, but needs a refill. She is not sure if it is a sinus infection. She is going to message Dr. Almaraz and see what he recommends, but would like the refill. Please advise.    Misty Villalobos RN  [facility]

## 2019-04-22 RX ORDER — IBUPROFEN 800 MG/1
TABLET, FILM COATED ORAL
Qty: 90 TABLET | Refills: 1 | OUTPATIENT
Start: 2019-04-22

## 2019-04-29 ENCOUNTER — ANCILLARY PROCEDURE (OUTPATIENT)
Dept: MAMMOGRAPHY | Facility: CLINIC | Age: 54
End: 2019-04-29
Payer: COMMERCIAL

## 2019-04-29 ENCOUNTER — OFFICE VISIT (OUTPATIENT)
Dept: FAMILY MEDICINE | Facility: CLINIC | Age: 54
End: 2019-04-29
Payer: COMMERCIAL

## 2019-04-29 VITALS
TEMPERATURE: 97.5 F | OXYGEN SATURATION: 99 % | HEART RATE: 80 BPM | SYSTOLIC BLOOD PRESSURE: 158 MMHG | WEIGHT: 200 LBS | BODY MASS INDEX: 37.76 KG/M2 | RESPIRATION RATE: 16 BRPM | HEIGHT: 61 IN | DIASTOLIC BLOOD PRESSURE: 78 MMHG

## 2019-04-29 DIAGNOSIS — E87.6 HYPOKALEMIA: ICD-10-CM

## 2019-04-29 DIAGNOSIS — Z12.31 VISIT FOR SCREENING MAMMOGRAM: ICD-10-CM

## 2019-04-29 DIAGNOSIS — Z12.11 SCREEN FOR COLON CANCER: ICD-10-CM

## 2019-04-29 DIAGNOSIS — J33.9 NASAL POLYPOSIS: ICD-10-CM

## 2019-04-29 DIAGNOSIS — I10 BENIGN ESSENTIAL HYPERTENSION: ICD-10-CM

## 2019-04-29 DIAGNOSIS — F43.23 ADJUSTMENT DISORDER WITH MIXED ANXIETY AND DEPRESSED MOOD: ICD-10-CM

## 2019-04-29 DIAGNOSIS — Z00.01 ENCOUNTER FOR ROUTINE ADULT PHYSICAL EXAM WITH ABNORMAL FINDINGS: ICD-10-CM

## 2019-04-29 DIAGNOSIS — F43.23 SITUATIONAL MIXED ANXIETY AND DEPRESSIVE DISORDER: ICD-10-CM

## 2019-04-29 DIAGNOSIS — F41.9 ANXIETY: ICD-10-CM

## 2019-04-29 PROBLEM — R44.8 FACIAL PRESSURE: Status: RESOLVED | Noted: 2018-08-02 | Resolved: 2019-04-29

## 2019-04-29 PROBLEM — H93.8X3 EAR FULLNESS, BILATERAL: Status: RESOLVED | Noted: 2018-08-02 | Resolved: 2019-04-29

## 2019-04-29 PROBLEM — E66.09 NON MORBID OBESITY DUE TO EXCESS CALORIES: Status: RESOLVED | Noted: 2017-02-24 | Resolved: 2019-04-29

## 2019-04-29 LAB
CHOLEST SERPL-MCNC: 215 MG/DL
GLUCOSE SERPL-MCNC: 99 MG/DL (ref 70–99)
HDLC SERPL-MCNC: 78 MG/DL
LDLC SERPL CALC-MCNC: 124 MG/DL
NONHDLC SERPL-MCNC: 137 MG/DL
TRIGL SERPL-MCNC: 64 MG/DL

## 2019-04-29 PROCEDURE — 82947 ASSAY GLUCOSE BLOOD QUANT: CPT | Performed by: FAMILY MEDICINE

## 2019-04-29 PROCEDURE — 36415 COLL VENOUS BLD VENIPUNCTURE: CPT | Performed by: FAMILY MEDICINE

## 2019-04-29 PROCEDURE — 80061 LIPID PANEL: CPT | Performed by: FAMILY MEDICINE

## 2019-04-29 PROCEDURE — 77067 SCR MAMMO BI INCL CAD: CPT | Mod: TC

## 2019-04-29 PROCEDURE — 99213 OFFICE O/P EST LOW 20 MIN: CPT | Mod: 25 | Performed by: FAMILY MEDICINE

## 2019-04-29 PROCEDURE — 99396 PREV VISIT EST AGE 40-64: CPT | Performed by: FAMILY MEDICINE

## 2019-04-29 RX ORDER — HYDROCHLOROTHIAZIDE 25 MG/1
25 TABLET ORAL DAILY
Qty: 90 TABLET | Refills: 0 | Status: SHIPPED | OUTPATIENT
Start: 2019-04-29 | End: 2019-07-17 | Stop reason: ALTCHOICE

## 2019-04-29 RX ORDER — HYDRALAZINE HYDROCHLORIDE 25 MG/1
TABLET, FILM COATED ORAL
Qty: 360 TABLET | Refills: 3 | Status: SHIPPED | OUTPATIENT
Start: 2019-04-29 | End: 2020-07-01

## 2019-04-29 RX ORDER — CARVEDILOL PHOSPHATE 40 MG/1
CAPSULE, EXTENDED RELEASE ORAL
Qty: 90 CAPSULE | Refills: 3 | Status: SHIPPED | OUTPATIENT
Start: 2019-04-29 | End: 2020-07-01

## 2019-04-29 RX ORDER — CITALOPRAM HYDROBROMIDE 20 MG/1
TABLET ORAL
Qty: 90 TABLET | Refills: 4 | Status: SHIPPED | OUTPATIENT
Start: 2019-04-29 | End: 2020-07-06

## 2019-04-29 RX ORDER — LOSARTAN POTASSIUM 100 MG/1
100 TABLET ORAL DAILY
Qty: 90 TABLET | Refills: 3 | Status: SHIPPED | OUTPATIENT
Start: 2019-04-29 | End: 2019-07-17 | Stop reason: ALTCHOICE

## 2019-04-29 RX ORDER — POTASSIUM CHLORIDE 750 MG/1
10 TABLET, EXTENDED RELEASE ORAL DAILY
Qty: 90 TABLET | Refills: 3 | Status: SHIPPED | OUTPATIENT
Start: 2019-04-29 | End: 2020-07-06

## 2019-04-29 RX ORDER — AMLODIPINE BESYLATE 2.5 MG/1
2.5 TABLET ORAL DAILY
Qty: 30 TABLET | Refills: 0 | Status: SHIPPED | OUTPATIENT
Start: 2019-04-29 | End: 2019-05-26

## 2019-04-29 ASSESSMENT — PATIENT HEALTH QUESTIONNAIRE - PHQ9: SUM OF ALL RESPONSES TO PHQ QUESTIONS 1-9: 0

## 2019-04-29 ASSESSMENT — ENCOUNTER SYMPTOMS
ABDOMINAL PAIN: 0
DIZZINESS: 0
CONSTIPATION: 0
CHILLS: 0
MYALGIAS: 0
SORE THROAT: 0
EYE PAIN: 0
HEARTBURN: 0
DYSURIA: 0
NERVOUS/ANXIOUS: 0
BREAST MASS: 0
PALPITATIONS: 0
HEMATOCHEZIA: 0
FREQUENCY: 0
HEMATURIA: 0
DIARRHEA: 0
WEAKNESS: 0
PARESTHESIAS: 0
JOINT SWELLING: 0
COUGH: 0
NAUSEA: 0
FEVER: 0
SHORTNESS OF BREATH: 0

## 2019-04-29 ASSESSMENT — MIFFLIN-ST. JEOR: SCORE: 1439.57

## 2019-04-29 NOTE — PROGRESS NOTES
SUBJECTIVE:   CC: Juanita Lechuga is an 53 year old woman who presents for preventive health visit.     Healthy Habits:     Getting at least 3 servings of Calcium per day:  Yes    Bi-annual eye exam:  Yes    Dental care twice a year:  Yes    Sleep apnea or symptoms of sleep apnea:  None    Diet:  Low salt, Low fat/cholesterol and Carbohydrate counting    Frequency of exercise:  2-3 days/week    Duration of exercise:  15-30 minutes    Taking medications regularly:  Yes    Medication side effects:  Not applicable    PHQ-2 Total Score: 0    Additional concerns today:  No        Today's PHQ-2 Score:   PHQ-2 (  [employer]) 2019   Q1: Little interest or pleasure in doing things 0   Q2: Feeling down, depressed or hopeless 0   PHQ-2 Score 0   Q1: Little interest or pleasure in doing things Not at all   Q2: Feeling down, depressed or hopeless Not at all   PHQ-2 Score 0       Abuse: Current or Past(Physical, Sexual or Emotional)- No  Do you feel safe in your environment? Yes    Social History     Tobacco Use     Smoking status: Former Smoker     Packs/day: 0.50     Years: 32.00     Pack years: 16.00     Types: Cigarettes     Last attempt to quit: 10/24/2011     Years since quittin.5     Smokeless tobacco: Never Used   Substance Use Topics     Alcohol use: Yes     Comment: 2 drinks a month         Alcohol Use 2019   Prescreen: >3 drinks/day or >7 drinks/week? No   Prescreen: >3 drinks/day or >7 drinks/week? -       Reviewed orders with patient.  Reviewed health maintenance and updated orders accordingly - Yes  Labs reviewed in EPIC  BP Readings from Last 3 Encounters:   19 158/78   18 155/74   10/29/18 161/80    Wt Readings from Last 3 Encounters:   19 90.7 kg (200 lb)   18 91.2 kg (201 lb)   10/29/18 90.7 kg (200 lb)                  Patient Active Problem List   Diagnosis     Old disruption of anterior cruciate ligament     CARDIOVASCULAR SCREENING; LDL GOAL LESS THAN 130     Advanced  directives, counseling/discussion     Situational mixed anxiety and depressive disorder     Adjustment disorder with mixed anxiety and depressed mood     Anxiety     Hypertrophy of breast     Benign essential hypertension     Morbid obesity (H)     Chronic pansinusitis     Nasal polyposis     Past Surgical History:   Procedure Laterality Date     BUNIONECTOMY RT/LT  2008    (L) first toe     BUNIONECTOMY RT/LT  12    right first toe     OPTICAL TRACKING SYSTEM ENDOSCOPIC SINUS SURGERY Bilateral 10/9/2018    Procedure: OPTICAL TRACKING SYSTEM ENDOSCOPIC SINUS SURGERY;  Image guided endoscopic sinus surgery endoscopic septoplasty;  Surgeon: Micky Almaraz MD;  Location: [facility] OR     SEPTOPLASTY N/A 10/9/2018    Procedure: SEPTOPLASTY;;  Surgeon: Micky Almaraz MD;  Location: [facility] OR       Social History     Tobacco Use     Smoking status: Former Smoker     Packs/day: 0.50     Years: 32.00     Pack years: 16.00     Types: Cigarettes     Last attempt to quit: 10/24/2011     Years since quittin.5     Smokeless tobacco: Never Used   Substance Use Topics     Alcohol use: Yes     Comment: 2 drinks a month     Family History   Problem Relation Age of Onset     C.A.D. Father      Coronary Artery Disease Father      Cancer Maternal Grandmother         ?         Current Outpatient Medications   Medication Sig Dispense Refill     ALPRAZolam (XANAX) 0.25 MG tablet 1-2 q 6 hours as needed 10 tablet 0     amLODIPine (NORVASC) 2.5 MG tablet Take 1 tablet (2.5 mg) by mouth daily 30 tablet 0     azelastine (ASTEPRO) 0.15 % nasal spray Spray 2 sprays into both nostrils 2 times daily 1 Bottle 12     carvedilol ER (COREG CR) 40 MG 24 hr capsule TAKE ONE CAPSULE BY MOUTH ONCE DAILY 90 capsule 3     citalopram (CELEXA) 20 MG tablet TAKE ONE TABLET BY MOUTH ONCE DAILY 90 tablet 4     fluticasone (FLONASE) 50 MCG/ACT nasal spray Spray 2 sprays into both nostrils daily 1 Bottle 3     hydrALAZINE (APRESOLINE) 25 MG tablet  Take one four times a day for high blood pressure 360 tablet 3     hydrochlorothiazide (HYDRODIURIL) 25 MG tablet Take 1 tablet (25 mg) by mouth daily 90 tablet 0     losartan (COZAAR) 100 MG tablet Take 1 tablet (100 mg) by mouth daily 90 tablet 3     MULTI-VITAMIN OR TABS 1 TABLET DAILY       potassium chloride ER (K-DUR/KLOR-CON M) 10 MEQ CR tablet Take 1 tablet (10 mEq) by mouth daily 90 tablet 3     Allergies   Allergen Reactions     Lisinopril Cough     Recent Labs   Lab Test 09/04/18  1143 08/29/18  1241 03/23/18  0859 10/05/17  1719 02/24/17  1055 01/18/16  1057   LDL  --   --  124*  --  115* 113*   HDL  --   --  62  --  80 72   TRIG  --   --  61  --  71 62   ALT  --   --  30  --   --   --    CR  --   --  0.45*  --  0.48* 0.59   GFRESTIMATED  --   --  >90  --  >90  Non  GFR Calc   >90  Non  GFR Calc     GFRESTBLACK  --   --  >90  --  >90   GFR Calc   >90   GFR Calc     POTASSIUM 3.9 3.3* 4.0  --  3.9 4.3   TSH  --   --   --  2.90 1.84 2.71        Pt gets a annual mammogram    Pertinent mammograms are reviewed under the imaging tab.  History of abnormal Pap smear: NO - age 30- 65 PAP every 3 years recommended  PAP / HPV Latest Ref Rng & Units 2/24/2017 12/1/2014 10/3/2011   PAP - NIL NIL NIL   HPV 16 DNA NEG Negative - -   HPV 18 DNA NEG Negative - -   OTHER HR HPV NEG Negative - -     Reviewed and updated as needed this visit by clinical staff  Tobacco  Allergies  Meds  Problems  Med Hx  Surg Hx  Fam Hx  Soc Hx        Hypertension Follow-up      Outpatient blood pressures are not being checked.    Low Salt Diet: no added salt    Anxiety Follow-Up    Status since last visit: Improved     Other associated symptoms:None    Complicating factors:   Significant life event: No   Current substance abuse: None  Depression symptoms: No  CANDELARIO-7 SCORE 4/5/2012 1/18/2016 7/24/2018   Total Score 13 - -   Total Score - 0 17       CANDELARIO-7    Amount of  exercise or physical activity: 2-3 days/week for an average of 30-45 minutes    Problems taking medications regularly: No    Medication side effects: none    Diet: low salt and low fat/cholesterol        Reviewed and updated as needed this visit by Provider  Tobacco  Allergies  Meds  Problems  Med Hx  Surg Hx  Fam Hx        Past Medical History:   Diagnosis Date     ACL tear 12/09    left, improved with physical therapy     COPD (chronic obstructive pulmonary disease) (H) 3/29/2012    resolved after she quit smoking     HTN (hypertension)       Past Surgical History:   Procedure Laterality Date     BUNIONECTOMY RT/LT  8/7/2008    (L) first toe     BUNIONECTOMY RT/LT  9/20/12    right first toe     OPTICAL TRACKING SYSTEM ENDOSCOPIC SINUS SURGERY Bilateral 10/9/2018    Procedure: OPTICAL TRACKING SYSTEM ENDOSCOPIC SINUS SURGERY;  Image guided endoscopic sinus surgery endoscopic septoplasty;  Surgeon: Micky Almaraz MD;  Location: [facility]     SEPTOPLASTY N/A 10/9/2018    Procedure: SEPTOPLASTY;;  Surgeon: Micky Almaraz MD;  Location: [facility] OR       Review of Systems   Constitutional: Negative for chills and fever.   HENT: Negative for congestion, ear pain and sore throat.    Eyes: Negative for pain and visual disturbance.   Respiratory: Negative for cough and shortness of breath.    Cardiovascular: Negative for chest pain, palpitations and peripheral edema.   Gastrointestinal: Negative for abdominal pain, constipation, diarrhea, heartburn, hematochezia and nausea.   Breasts:  Negative for tenderness, breast mass and discharge.   Genitourinary: Negative for dysuria, frequency, hematuria, pelvic pain, urgency, vaginal bleeding and vaginal discharge.   Musculoskeletal: Negative for joint swelling and myalgias.   Skin: Negative for rash.   Neurological: Negative for dizziness, weakness and paresthesias.   Psychiatric/Behavioral: Negative for mood changes. The patient is not nervous/anxious.   "    CONSTITUTIONAL: NEGATIVE for fever, chills, change in weight  INTEGUMENTARY/SKIN: NEGATIVE for worrisome rashes, moles or lesions  EYES: NEGATIVE for vision changes or irritation  ENT: NEGATIVE for ear, mouth and throat problems  RESP: NEGATIVE for significant cough or SOB  BREAST: NEGATIVE for masses, tenderness or discharge  CV: NEGATIVE for chest pain, palpitations or peripheral edema  GI: NEGATIVE for nausea, abdominal pain, heartburn, or change in bowel habits  : NEGATIVE for unusual urinary or vaginal symptoms. No vaginal bleeding.  MUSCULOSKELETAL: NEGATIVE for significant arthralgias or myalgia  NEURO: NEGATIVE for weakness, dizziness or paresthesias  PSYCHIATRIC: NEGATIVE for changes in mood or affect      OBJECTIVE:   /78   Pulse 80   Temp 97.5  F (36.4  C) (Oral)   Resp 16   Ht 1.549 m (5' 1\")   Wt 90.7 kg (200 lb)   LMP 12/28/2015 (Approximate)   SpO2 99%   Breastfeeding? No   BMI 37.79 kg/m    Physical Exam  GENERAL APPEARANCE: healthy, alert and no distress  EYES: Eyes grossly normal to inspection, PERRL and conjunctivae and sclerae normal  HENT: ear canals and TM's normal, nose and mouth without ulcers or lesions, oropharynx clear and oral mucous membranes moist  NECK: no adenopathy, no asymmetry, masses, or scars and thyroid normal to palpation  RESP: lungs clear to auscultation - no rales, rhonchi or wheezes  BREAST: normal without masses, tenderness or nipple discharge and no palpable axillary masses or adenopathy  CV: regular rate and rhythm, normal S1 S2, no S3 or S4, no murmur, click or rub, no peripheral edema and peripheral pulses strong  ABDOMEN: soft, nontender, no hepatosplenomegaly, no masses and bowel sounds normal  MS: no musculoskeletal defects are noted and gait is age appropriate without ataxia  SKIN: no suspicious lesions or rashes  NEURO: Normal strength and tone, sensory exam grossly normal, mentation intact and speech normal  PSYCH: mentation appears normal " and affect normal/bright    Diagnostic Test Results:  Pending     ASSESSMENT/PLAN:   1. Encounter for routine adult physical exam with abnormal findings    - Glucose  - Lipid panel reflex to direct LDL Fasting    2. Anxiety  Doing well  - citalopram (CELEXA) 20 MG tablet; TAKE ONE TABLET BY MOUTH ONCE DAILY  Dispense: 90 tablet; Refill: 4    3. Adjustment disorder with mixed anxiety and depressed mood  As above  Doing well    4. Benign essential hypertension  Advised add Norvasc  Follow up ancillary BP check 2 weeks  - carvedilol ER (COREG CR) 40 MG 24 hr capsule; TAKE ONE CAPSULE BY MOUTH ONCE DAILY  Dispense: 90 capsule; Refill: 3  - hydrALAZINE (APRESOLINE) 25 MG tablet; Take one four times a day for high blood pressure  Dispense: 360 tablet; Refill: 3  - hydrochlorothiazide (HYDRODIURIL) 25 MG tablet; Take 1 tablet (25 mg) by mouth daily  Dispense: 90 tablet; Refill: 0  - losartan (COZAAR) 100 MG tablet; Take 1 tablet (100 mg) by mouth daily  Dispense: 90 tablet; Refill: 3  - amLODIPine (NORVASC) 2.5 MG tablet; Take 1 tablet (2.5 mg) by mouth daily  Dispense: 30 tablet; Refill: 0    5. Situational mixed anxiety and depressive disorder  Stable   - citalopram (CELEXA) 20 MG tablet; TAKE ONE TABLET BY MOUTH ONCE DAILY  Dispense: 90 tablet; Refill: 4    6. Hypokalemia  Pending labs  - potassium chloride ER (K-DUR/KLOR-CON M) 10 MEQ CR tablet; Take 1 tablet (10 mEq) by mouth daily  Dispense: 90 tablet; Refill: 3    7. Screen for colon cancer  Advised colonoscopy-Pt declined  She is willing to do Cologuard    8. Nasal polyposis  Stable       COUNSELING:  Reviewed preventive health counseling, as reflected in patient instructions       Regular exercise       Healthy diet/nutrition       Vision screening       Hearing screening       Osteoporosis Prevention/Bone Health       Colon cancer screening       The 10-year ASCVD risk score (Cristina HENDRICKS Jr., et al., 2013) is: 3.6%    Values used to calculate the score:      Age: 53  "years      Sex: Female      Is Non- : No      Diabetic: No      Tobacco smoker: No      Systolic Blood Pressure: 158 mmHg      Is BP treated: Yes      HDL Cholesterol: 62 mg/dL      Total Cholesterol: 198 mg/dL       Advance Care Planning    BP Readings from Last 1 Encounters:   04/29/19 158/78     Estimated body mass index is 37.79 kg/m  as calculated from the following:    Height as of this encounter: 1.549 m (5' 1\").    Weight as of this encounter: 90.7 kg (200 lb).      Weight management plan: Discussed healthy diet and exercise guidelines     reports that she quit smoking about 7 years ago. Her smoking use included cigarettes. She has a 16.00 pack-year smoking history. She has never used smokeless tobacco.      Counseling Resources:  ATP IV Guidelines  Pooled Cohorts Equation Calculator  Breast Cancer Risk Calculator  FRAX Risk Assessment  ICSI Preventive Guidelines  Dietary Guidelines for Americans, 2010  USDA's MyPlate  ASA Prophylaxis  Lung CA Screening    Mable Cardoso MD  [facility]  "

## 2019-05-13 ENCOUNTER — ALLIED HEALTH/NURSE VISIT (OUTPATIENT)
Dept: NURSING | Facility: CLINIC | Age: 54
End: 2019-05-13
Payer: COMMERCIAL

## 2019-05-13 VITALS
DIASTOLIC BLOOD PRESSURE: 78 MMHG | SYSTOLIC BLOOD PRESSURE: 144 MMHG | TEMPERATURE: 97.9 F | HEART RATE: 69 BPM | RESPIRATION RATE: 16 BRPM | OXYGEN SATURATION: 96 %

## 2019-05-13 DIAGNOSIS — Z01.30 BP CHECK: Primary | ICD-10-CM

## 2019-05-26 DIAGNOSIS — I10 BENIGN ESSENTIAL HYPERTENSION: ICD-10-CM

## 2019-05-28 ENCOUNTER — TRANSFERRED RECORDS (OUTPATIENT)
Dept: HEALTH INFORMATION MANAGEMENT | Facility: CLINIC | Age: 54
End: 2019-05-28

## 2019-05-28 LAB — COLOGUARD-ABSTRACT: NEGATIVE

## 2019-05-29 RX ORDER — AMLODIPINE BESYLATE 2.5 MG/1
TABLET ORAL
Qty: 30 TABLET | Refills: 0 | Status: SHIPPED | OUTPATIENT
Start: 2019-05-29 | End: 2019-07-17

## 2019-05-29 NOTE — TELEPHONE ENCOUNTER
"Routing refill request to provider for review/approval because:  Labs out of range:  BP  Labs not current:  Cr    Requested Prescriptions   Pending Prescriptions Disp Refills     amLODIPine (NORVASC) 2.5 MG tablet [Pharmacy Med Name: AMLODIPINE 2.5MG TAB] 30 tablet 0     Sig: TAKE 1 TABLET BY MOUTH ONCE DAILY       Calcium Channel Blockers Protocol  Failed - 5/28/2019  8:33 AM        Failed - Blood pressure under 140/90 in past 12 months     BP Readings from Last 3 Encounters:   05/13/19 144/78   04/29/19 158/78   11/26/18 155/74                 Failed - Normal serum creatinine on file in past 12 months     Recent Labs   Lab Test 03/23/18  0859   CR 0.45*             Passed - Recent (12 mo) or future (30 days) visit within the authorizing provider's specialty     Patient had office visit in the last 12 months or has a visit in the next 30 days with authorizing provider or within the authorizing provider's specialty.  See \"Patient Info\" tab in inbasket, or \"Choose Columns\" in Meds & Orders section of the refill encounter.              Passed - Medication is active on med list        Passed - Patient is age 18 or older        Passed - No active pregnancy on record        Passed - No positive pregnancy test in past 12 months        Marcia Chisholm RN  "

## 2019-06-10 ENCOUNTER — TELEPHONE (OUTPATIENT)
Dept: FAMILY MEDICINE | Facility: CLINIC | Age: 54
End: 2019-06-10

## 2019-06-10 NOTE — TELEPHONE ENCOUNTER
Results have been received from [employer] for     The Cologuard Results Negative     Results date 5/28/2019    Results have been placed in provider basket- provider to review and sign off on results.  Please send back to team with OK to send result letter and any additional follow-up needed.     Teri Crystal,       sent copy of results to scanning.     Letter is pending in Communications tab.

## 2019-06-10 NOTE — LETTER
Aura 10, 2019      Juanita Lechuga  [address]  [place] [address]        Dear ,    The result of your recent Cologuard testing was negative. A negative result means that Cologuard did not detect significant levels of DNA and/or hemoglobin biomarkers in the stool which are associated with colon cancer or precancer.  Thank you for completing your screening, your next screening should be completed in 3 years.  If you have any questions or concerns, please contact your care team at [phone].       Sincerely,        Mable Cardoso MD

## 2019-06-23 DIAGNOSIS — I10 BENIGN ESSENTIAL HYPERTENSION: ICD-10-CM

## 2019-06-24 NOTE — TELEPHONE ENCOUNTER
"Routing refill request to provider for review/approval because:  Labs out of range:  BP  Labs not current:  Cr    Requested Prescriptions   Pending Prescriptions Disp Refills     amLODIPine (NORVASC) 2.5 MG tablet [Pharmacy Med Name: AMLODIPINE 2.5MG TAB] 30 tablet 0     Sig: TAKE 1 TABLET BY MOUTH ONCE DAILY       Calcium Channel Blockers Protocol  Failed - 6/24/2019  6:54 AM        Failed - Blood pressure under 140/90 in past 12 months     BP Readings from Last 3 Encounters:   05/13/19 144/78   04/29/19 158/78   11/26/18 155/74                 Failed - Normal serum creatinine on file in past 12 months     Recent Labs   Lab Test 03/23/18  0859   CR 0.45*             Passed - Recent (12 mo) or future (30 days) visit within the authorizing provider's specialty     Patient had office visit in the last 12 months or has a visit in the next 30 days with authorizing provider or within the authorizing provider's specialty.  See \"Patient Info\" tab in inbasket, or \"Choose Columns\" in Meds & Orders section of the refill encounter.              Passed - Medication is active on med list        Passed - Patient is age 18 or older        Passed - No active pregnancy on record        Passed - No positive pregnancy test in past 12 months        Marcia Chisholm RN  "

## 2019-06-25 RX ORDER — AMLODIPINE BESYLATE 2.5 MG/1
TABLET ORAL
Qty: 30 TABLET | Refills: 0 | Status: SHIPPED | OUTPATIENT
Start: 2019-06-25 | End: 2019-07-09

## 2019-06-25 NOTE — TELEPHONE ENCOUNTER
Patient is calling back stating she just has a blood pressure check and she does not know why she needs to do it again. Please call back

## 2019-06-25 NOTE — TELEPHONE ENCOUNTER
Left a message for Juanita to call the clinic to schedule a appointment. Please help the patient schedule for Provider BP Follow up appointment regarding medications. Teri Crystal,

## 2019-06-25 NOTE — TELEPHONE ENCOUNTER
Called and spoke with patient and advised her that her last 3 BP readings have been elevated and medication changes may be necessary.   Patient agreed and scheduled appointment.   No further questions.     Marcia Chisholm RN

## 2019-07-09 ENCOUNTER — OFFICE VISIT (OUTPATIENT)
Dept: FAMILY MEDICINE | Facility: CLINIC | Age: 54
End: 2019-07-09
Payer: COMMERCIAL

## 2019-07-09 VITALS
OXYGEN SATURATION: 97 % | DIASTOLIC BLOOD PRESSURE: 86 MMHG | TEMPERATURE: 97.3 F | SYSTOLIC BLOOD PRESSURE: 138 MMHG | WEIGHT: 204.2 LBS | HEART RATE: 86 BPM | RESPIRATION RATE: 14 BRPM | BODY MASS INDEX: 38.55 KG/M2 | HEIGHT: 61 IN

## 2019-07-09 DIAGNOSIS — F41.9 ANXIETY: ICD-10-CM

## 2019-07-09 DIAGNOSIS — I10 BENIGN ESSENTIAL HYPERTENSION: Primary | ICD-10-CM

## 2019-07-09 DIAGNOSIS — E66.01 MORBID OBESITY (H): ICD-10-CM

## 2019-07-09 PROCEDURE — 99213 OFFICE O/P EST LOW 20 MIN: CPT | Performed by: FAMILY MEDICINE

## 2019-07-09 RX ORDER — AMLODIPINE BESYLATE 2.5 MG/1
2.5 TABLET ORAL DAILY
Qty: 90 TABLET | Refills: 3 | Status: SHIPPED | OUTPATIENT
Start: 2019-07-09 | End: 2020-07-07

## 2019-07-09 ASSESSMENT — ANXIETY QUESTIONNAIRES
1. FEELING NERVOUS, ANXIOUS, OR ON EDGE: NOT AT ALL
3. WORRYING TOO MUCH ABOUT DIFFERENT THINGS: NOT AT ALL
2. NOT BEING ABLE TO STOP OR CONTROL WORRYING: NOT AT ALL
6. BECOMING EASILY ANNOYED OR IRRITABLE: NOT AT ALL
5. BEING SO RESTLESS THAT IT IS HARD TO SIT STILL: SEVERAL DAYS
IF YOU CHECKED OFF ANY PROBLEMS ON THIS QUESTIONNAIRE, HOW DIFFICULT HAVE THESE PROBLEMS MADE IT FOR YOU TO DO YOUR WORK, TAKE CARE OF THINGS AT HOME, OR GET ALONG WITH OTHER PEOPLE: NOT DIFFICULT AT ALL
GAD7 TOTAL SCORE: 2
7. FEELING AFRAID AS IF SOMETHING AWFUL MIGHT HAPPEN: NOT AT ALL

## 2019-07-09 ASSESSMENT — PATIENT HEALTH QUESTIONNAIRE - PHQ9
SUM OF ALL RESPONSES TO PHQ QUESTIONS 1-9: 4
5. POOR APPETITE OR OVEREATING: SEVERAL DAYS

## 2019-07-09 ASSESSMENT — MIFFLIN-ST. JEOR: SCORE: 1458.63

## 2019-07-09 NOTE — PROGRESS NOTES
Subjective     Juanita Lechuga is a 53 year old female who presents to clinic today for the following health issues:    History of Present Illness        Mental Health Follow-up:  Patient presents to follow-up on Anxiety.    Patient's anxiety since last visit has been:  Better  The patient is not having other symptoms associated with anxiety.    Patient is not feeling anxious or having panic attacks.  Patient has no concerns about alcohol or drug use.     Social History  Tobacco Use    Smoking status: Former Smoker      Packs/day: 0.50      Years: 32.00      Pack years: 16      Types: Cigarettes      Quit date: 10/24/2011      Years since quittin.7    Smokeless tobacco: Never Used  Alcohol use: Yes    Comment: 2 drinks a month  Drug use: No      Today's PHQ-9         PHQ-9 Total Score:         PHQ-9 Q9 Thoughts of better off dead/self-harm past 2 weeks :       Thoughts of suicide or self harm:      Self-harm Plan:        Self-harm Action:          Safety concerns for self or others:           Hypertension: She presents for follow up of hypertension.  She does check blood pressure  regularly outside of the clinic. Outpatient blood pressures have not been over 140/90. (meds been helping ) She follows a low salt diet.     She eats 2-3 servings of fruits and vegetables daily.She consumes 1 sweetened beverage(s) daily.  She is taking medications regularly.     Weight Loss     Patient wants to discuss weight     And talk about lab results      Anxiety Follow-Up    How are you doing with your anxiety since your last visit? Improved     Are you having other symptoms that might be associated with anxiety? No    Have you had a significant life event? No     Are you feeling depressed? No    Do you have any concerns with your use of alcohol or other drugs? No    Social History     Tobacco Use     Smoking status: Former Smoker     Packs/day: 0.50     Years: 32.00     Pack years: 16.00     Types: Cigarettes     Last attempt to  quit: 10/24/2011     Years since quittin.7     Smokeless tobacco: Never Used   Substance Use Topics     Alcohol use: Yes     Comment: 2 drinks a month     Drug use: No     CANDELARIO-7 SCORE 2016   Total Score - - -   Total Score 0 17 2     PHQ 2018   PHQ-9 Total Score 2 0 4   Q9: Thoughts of better off dead/self-harm past 2 weeks Not at all Not at all Not at all         Patient Active Problem List   Diagnosis     Old disruption of anterior cruciate ligament     CARDIOVASCULAR SCREENING; LDL GOAL LESS THAN 130     Advanced directives, counseling/discussion     Situational mixed anxiety and depressive disorder     Adjustment disorder with mixed anxiety and depressed mood     Anxiety     Hypertrophy of breast     Benign essential hypertension     Morbid obesity (H)     Chronic pansinusitis     Nasal polyposis     Past Surgical History:   Procedure Laterality Date     BUNIONECTOMY RT/LT  2008    (L) first toe     BUNIONECTOMY RT/LT  12    right first toe     OPTICAL TRACKING SYSTEM ENDOSCOPIC SINUS SURGERY Bilateral 10/9/2018    Procedure: OPTICAL TRACKING SYSTEM ENDOSCOPIC SINUS SURGERY;  Image guided endoscopic sinus surgery endoscopic septoplasty;  Surgeon: Micky Almaraz MD;  Location: [facility] OR     SEPTOPLASTY N/A 10/9/2018    Procedure: SEPTOPLASTY;;  Surgeon: Micky Almaraz MD;  Location: [facility] OR       Social History     Tobacco Use     Smoking status: Former Smoker     Packs/day: 0.50     Years: 32.00     Pack years: 16.00     Types: Cigarettes     Last attempt to quit: 10/24/2011     Years since quittin.7     Smokeless tobacco: Never Used   Substance Use Topics     Alcohol use: Yes     Comment: 2 drinks a month     Family History   Problem Relation Age of Onset     C.A.D. Father      Coronary Artery Disease Father      Cancer Maternal Grandmother         ?         Current Outpatient Medications   Medication Sig Dispense Refill     ALPRAZolam  (XANAX) 0.25 MG tablet 1-2 q 6 hours as needed 10 tablet 0     amLODIPine (NORVASC) 2.5 MG tablet Take 1 tablet (2.5 mg) by mouth daily 90 tablet 3     amLODIPine (NORVASC) 2.5 MG tablet TAKE 1 TABLET BY MOUTH ONCE DAILY 30 tablet 0     azelastine (ASTEPRO) 0.15 % nasal spray Spray 2 sprays into both nostrils 2 times daily 1 Bottle 12     carvedilol ER (COREG CR) 40 MG 24 hr capsule TAKE ONE CAPSULE BY MOUTH ONCE DAILY 90 capsule 3     citalopram (CELEXA) 20 MG tablet TAKE ONE TABLET BY MOUTH ONCE DAILY 90 tablet 4     fluticasone (FLONASE) 50 MCG/ACT nasal spray Spray 2 sprays into both nostrils daily 1 Bottle 3     hydrALAZINE (APRESOLINE) 25 MG tablet Take one four times a day for high blood pressure 360 tablet 3     hydrochlorothiazide (HYDRODIURIL) 25 MG tablet Take 1 tablet (25 mg) by mouth daily 90 tablet 0     losartan (COZAAR) 100 MG tablet Take 1 tablet (100 mg) by mouth daily 90 tablet 3     MULTI-VITAMIN OR TABS 1 TABLET DAILY       potassium chloride ER (K-DUR/KLOR-CON M) 10 MEQ CR tablet Take 1 tablet (10 mEq) by mouth daily 90 tablet 3     Allergies   Allergen Reactions     Lisinopril Cough     Recent Labs   Lab Test 04/29/19  1110 09/04/18  1143 08/29/18  1241 03/23/18  0859 10/05/17  1719 02/24/17  1055   *  --   --  124*  --  115*   HDL 78  --   --  62  --  80   TRIG 64  --   --  61  --  71   ALT  --   --   --  30  --   --    CR  --   --   --  0.45*  --  0.48*   GFRESTIMATED  --   --   --  >90  --  >90  Non  GFR Calc     GFRESTBLACK  --   --   --  >90  --  >90  African American GFR Calc     POTASSIUM  --  3.9 3.3* 4.0  --  3.9   TSH  --   --   --   --  2.90 1.84      BP Readings from Last 3 Encounters:   07/09/19 138/86   05/13/19 144/78   04/29/19 158/78    Wt Readings from Last 3 Encounters:   07/09/19 92.6 kg (204 lb 3.2 oz)   04/29/19 90.7 kg (200 lb)   11/26/18 91.2 kg (201 lb)            Hypertension Follow-up      Do you check your blood pressure regularly outside of  "the clinic? Yes     Are you following a low salt diet? Yes    Are your blood pressures ever more than 140 on the top number (systolic) OR more   than 90 on the bottom number (diastolic), for example 140/90? No    Amount of exercise or physical activity: trying    Problems taking medications regularly: No    Medication side effects: none    Diet: low salt                Reviewed and updated as needed this visit by Provider  Tobacco  Allergies  Meds  Problems  Med Hx  Surg Hx  Fam Hx         Review of Systems   ROS COMP: CONSTITUTIONAL: NEGATIVE for fever, chills, change in weight  ENT/MOUTH: NEGATIVE for ear, mouth and throat problems  RESP: NEGATIVE for significant cough or SOB  CV: NEGATIVE for chest pain, palpitations or peripheral edema  GI: NEGATIVE for nausea, abdominal pain, heartburn, or change in bowel habits  PSYCHIATRIC: doing better      Objective    /86   Pulse 86   Temp 97.3  F (36.3  C) (Oral)   Resp 14   Ht 1.549 m (5' 1\")   Wt 92.6 kg (204 lb 3.2 oz)   LMP 12/28/2015 (Approximate)   SpO2 97%   BMI 38.58 kg/m    Body mass index is 38.58 kg/m .  Physical Exam   GENERAL: healthy, alert and no distress  NECK: no adenopathy, no asymmetry, masses, or scars and thyroid normal to palpation  RESP: lungs clear to auscultation - no rales, rhonchi or wheezes  CV: regular rate and rhythm, normal S1 S2, no S3 or S4, no murmur, click or rub, no peripheral edema and peripheral pulses strong  ABDOMEN: soft, nontender, no hepatosplenomegaly, no masses and bowel sounds normal  MS: no gross musculoskeletal defects noted, no edema  PSYCH: mentation appears normal, affect normal/bright    Diagnostic Test Results:  Labs reviewed in Epic        Assessment & Plan     1. Benign essential hypertension  controlled  - amLODIPine (NORVASC) 2.5 MG tablet; Take 1 tablet (2.5 mg) by mouth daily  Dispense: 90 tablet; Refill: 3    2. Morbid obesity (H)  Low gerardo diet  And Exercise discussed   Consider weight " "management  3  Anxiety is better  Continue same medicines       BMI:   Estimated body mass index is 38.58 kg/m  as calculated from the following:    Height as of this encounter: 1.549 m (5' 1\").    Weight as of this encounter: 92.6 kg (204 lb 3.2 oz).   Weight management plan: Discussed healthy diet and exercise guidelines        Work on weight loss  Regular exercise    Return in about 6 months (around 1/9/2020) for Med check.    Mable Cardoso MD  [facility]      "

## 2019-07-09 NOTE — PATIENT INSTRUCTIONS
Patient Education     Low-Cholesterol Diet  Your body needs cholesterol to build new cells and create certain hormones. There are 2 kinds of cholesterol in your blood:       HDL ( good ) cholesterol. This prevents fat deposits (plaque) from building up in your arteries. In this way it protects against heart disease and stroke.    LDL ( bad ) cholesterol. This stays in your body and sticks to artery walls. Over time it may block blood flow to the heart and brain. This can cause a heart attack or stroke.  The cholesterol in your blood comes from 2 sources: cholesterol in food that you eat and cholesterol that your liver makes. You should limit the amount of cholesterol in your diet. But the cholesterol that your body makes has the greatest disease risk. And your body makes more cholesterol when your diet is high in bad fats (saturated and trans fats). There are 2 kinds of fats you can eat:    Good fats, or unsaturated fats (mono-unsaturated and poly-unsaturated). They raise the level of good cholesterol and lower the level of bad cholesterol. Good fats are found in vegetable oils such as olive, sunflower, corn, and soybean oils, and in nuts and seeds.    Bad fats, or saturated fats (including foods high in cholesterol) and trans fats. These raise your risk of disease. They lower the good cholesterol and raise the level of bad cholesterol. Bad fats are found in animal products, including meat, whole-milk dairy products, and butter. Some plants are also high in bad fats (coconut and palm plants). Trans fats are found in hard (stick) margarines. They are also in many fast foods and commercially baked goods. Soft margarine sold in tubs has fewer trans fats and is safer to use.  High blood cholesterol is usually due to a diet high in saturated fat, along with not being physically active. In some cases, genetics plays a role in causing high cholesterol. The tips below will help you create healthy eating habits that will  help lower your blood cholesterol level.  Create a diet high in good fats, low in bad fats (and low in cholesterol)  The following steps will help you create a diet high in good fats and low in bad fats:    Talk with your doctor before starting a low cholesterol diet or weight loss program.    Learn to read nutrition labels and select appropriate portion sizes.    When cooking, use plant-based unsaturated vegetable oils (sunflower, corn, soybean, canola, peanut, and olive oils).    Avoid saturated fats found in animal products such as meat, dairy (whole-milk, cheese and ice cream), poultry skin, and egg yolks. Plants high in saturated oils include coconut oil, palm oil, and palm kernel oil.    If you eat meat, choose smaller portions and lean cuts, such as round, sandy, sirloin, or loin. Eat more meatless meals.    Replace meat with fish at least 2 times a week. Fish is an important source of the unsaturated fat called omega-3 fatty acids. This fat has potential to lower the risk of heart disease.    Replace whole-milk dairy products with low-fat or nonfat products. Try soy products. Soy helps to reduce total cholesterol.    Supplement your diet with protective fibers. Eat nuts, seeds, and whole grains rather than white rice and bread. These foods lower both cholesterol and triglyceride levels. (Triglycerides are another fat found in the blood.) Walnuts are one of the best sources of omega-3 fatty acids.    Eat plenty of fresh fruits and vegetables daily.    Avoid fast foods and commercial baked goods. Assume they contain saturated fat unless labeled otherwise.  Date Last Reviewed: 8/1/2016 2000-2018 [employer]. [address] [place] [address], [place] [address]. All rights reserved. This information is not intended as a substitute for professional medical care. Always follow your healthcare professional's instructions.

## 2019-07-10 ASSESSMENT — ANXIETY QUESTIONNAIRES: GAD7 TOTAL SCORE: 2

## 2019-07-17 ENCOUNTER — TELEPHONE (OUTPATIENT)
Dept: FAMILY MEDICINE | Facility: CLINIC | Age: 54
End: 2019-07-17

## 2019-07-17 DIAGNOSIS — I10 BENIGN ESSENTIAL HYPERTENSION: Primary | ICD-10-CM

## 2019-07-17 RX ORDER — LOSARTAN POTASSIUM AND HYDROCHLOROTHIAZIDE 25; 100 MG/1; MG/1
1 TABLET ORAL DAILY
Qty: 90 TABLET | Refills: 1 | Status: SHIPPED | OUTPATIENT
Start: 2019-07-17 | End: 2020-01-10

## 2019-07-17 NOTE — TELEPHONE ENCOUNTER
Fax request from [facility] in [place].patient is requesting the combo drug of Losartan/HCTZ 100/25 in place of losartan (COZAAR) 100 MG tablet and hydrochlorothiazide (HYDRODIURIL) 25 MG tablet.  It is now in stock and patient prefers. Patient previously on combo drug last prescribed 03/23/18. Barbi Angeles MA

## 2019-07-17 NOTE — TELEPHONE ENCOUNTER
Huddled with Dr. Cardoso. OK to send combination tablet.   Rx sent to pharmacy.   Patient notified & verbalized understanding.     Marcia Chisholm RN

## 2019-08-15 DIAGNOSIS — J32.4 CHRONIC PANSINUSITIS: ICD-10-CM

## 2019-08-15 DIAGNOSIS — J33.9 NASAL POLYPOSIS: ICD-10-CM

## 2019-08-16 ENCOUNTER — MYC REFILL (OUTPATIENT)
Dept: OTHER | Age: 54
End: 2019-08-16

## 2019-08-16 DIAGNOSIS — J33.9 NASAL POLYPOSIS: ICD-10-CM

## 2019-08-16 DIAGNOSIS — J32.4 CHRONIC PANSINUSITIS: ICD-10-CM

## 2019-08-19 RX ORDER — FLUTICASONE PROPIONATE 50 MCG
2 SPRAY, SUSPENSION (ML) NASAL DAILY
Qty: 15.8 ML | Refills: 11 | Status: SHIPPED | OUTPATIENT
Start: 2019-08-19 | End: 2020-02-18

## 2019-08-19 NOTE — TELEPHONE ENCOUNTER
Last Written Prescription Date:  11/26/18  Last Fill Quantity: 1 bottle,   # refills: 3  Last Office Visit: 11/26/18  Future Office visit:       Routing refill request to provider for review/approval because:  Drug not on the [facility], [facility] or [facility] refill protocol or controlled substance    Kunal Agarwal RN....8/19/2019 7:50 AM

## 2019-08-27 RX ORDER — FLUTICASONE PROPIONATE 50 MCG
2 SPRAY, SUSPENSION (ML) NASAL DAILY
Qty: 3 ML | Refills: 11 | Status: SHIPPED | OUTPATIENT
Start: 2019-08-27 | End: 2020-09-04

## 2019-12-31 DIAGNOSIS — J31.0 CHRONIC RHINITIS: ICD-10-CM

## 2019-12-31 DIAGNOSIS — J33.9 NASAL POLYPOSIS: ICD-10-CM

## 2020-01-03 NOTE — TELEPHONE ENCOUNTER
"Routing refill request to provider for review/approval because:  Failed protocol - see below    Requested Prescriptions   Pending Prescriptions Disp Refills     azelastine (ASTEPRO) 0.15 % nasal spray 1 Bottle 12     Sig: Spray 2 sprays into both nostrils 2 times daily       Antihistamines Protocol Failed - 12/31/2019  2:21 PM        Failed - Recent (12 mo) or future (30 days) visit within the authorizing provider's specialty     Patient has had an office visit with the authorizing provider or a provider within the authorizing providers department within the previous 12 mos or has a future within next 30 days. See \"Patient Info\" tab in inbasket, or \"Choose Columns\" in Meds & Orders section of the refill encounter.              Failed - Medication is active on med list        Passed - Patient is 3-64 years of age     Apply weight-based dosing for peds patients age 3 - 12 years of age.    Forward request to provider for patients under the age of 3 or over the age of 64.          Marcia Banerjee RN  "

## 2020-01-06 RX ORDER — AZELASTINE HCL 205.5 UG/1
2 SPRAY NASAL 2 TIMES DAILY
Qty: 1 BOTTLE | Refills: 12 | Status: SHIPPED | OUTPATIENT
Start: 2020-01-06 | End: 2021-02-03

## 2020-01-07 DIAGNOSIS — I10 BENIGN ESSENTIAL HYPERTENSION: ICD-10-CM

## 2020-01-07 NOTE — LETTER
January 10, 2020          Juanita Lechuga,  [address]  [place] [address]          Dear Juanita Lechuga      Your provider has sent a 30 day jack refill of hydrochlorothiazide (HYDRODIURIL) 25 MG tablet and losartan (COZAAR) 100 MG tablet. You are due for an appointment for further refills. Appointment options could include: an in person office visit, telephone visit or Evisit through [facility]. Please contact the clinic to schedule an appointment for further refills.       If you have received your health care elsewhere, please call the clinic so the information can be documented in your chart.    Please call [phone] or message us through your alike account to schedule an appointment or provide information for your chart.     Feel free to contact us if you have any questions or concerns!    I look forward to seeing you and working with you on your health care needs.     Sincerely,       [facility]

## 2020-01-07 NOTE — TELEPHONE ENCOUNTER
hydrochlorothiazide (HYDRODIURIL) 25 MG tablet (Discontinued)      Last Written Prescription Date:  4/29/19  Last Fill Quantity: 90,   # refills: 0  Last Office Visit: 7/9/19  Future Office visit: n/a       Routing refill request to provider for review/approval because:  Medication is reported/historical    Miranda Cyr MA on 1/7/2020 at 1:08 PM

## 2020-01-10 RX ORDER — LOSARTAN POTASSIUM 100 MG/1
100 TABLET ORAL DAILY
Qty: 30 TABLET | Refills: 0 | Status: SHIPPED | OUTPATIENT
Start: 2020-01-10 | End: 2020-04-15

## 2020-01-10 RX ORDER — HYDROCHLOROTHIAZIDE 25 MG/1
TABLET ORAL
Qty: 30 TABLET | Refills: 0 | Status: SHIPPED | OUTPATIENT
Start: 2020-01-10 | End: 2020-02-18

## 2020-02-18 ENCOUNTER — OFFICE VISIT (OUTPATIENT)
Dept: FAMILY MEDICINE | Facility: CLINIC | Age: 55
End: 2020-02-18
Payer: COMMERCIAL

## 2020-02-18 VITALS
BODY MASS INDEX: 38.44 KG/M2 | OXYGEN SATURATION: 97 % | HEART RATE: 81 BPM | DIASTOLIC BLOOD PRESSURE: 70 MMHG | TEMPERATURE: 96.4 F | SYSTOLIC BLOOD PRESSURE: 132 MMHG | WEIGHT: 203.6 LBS | HEIGHT: 61 IN | RESPIRATION RATE: 16 BRPM

## 2020-02-18 DIAGNOSIS — E66.01 MORBID OBESITY (H): ICD-10-CM

## 2020-02-18 DIAGNOSIS — I10 BENIGN ESSENTIAL HYPERTENSION: Primary | ICD-10-CM

## 2020-02-18 DIAGNOSIS — F43.23 SITUATIONAL MIXED ANXIETY AND DEPRESSIVE DISORDER: ICD-10-CM

## 2020-02-18 DIAGNOSIS — F41.9 ANXIETY: ICD-10-CM

## 2020-02-18 LAB
ANION GAP SERPL CALCULATED.3IONS-SCNC: 4 MMOL/L (ref 3–14)
BUN SERPL-MCNC: 26 MG/DL (ref 7–30)
CALCIUM SERPL-MCNC: 9.1 MG/DL (ref 8.5–10.1)
CHLORIDE SERPL-SCNC: 100 MMOL/L (ref 94–109)
CO2 SERPL-SCNC: 31 MMOL/L (ref 20–32)
CREAT SERPL-MCNC: 0.47 MG/DL (ref 0.52–1.04)
GFR SERPL CREATININE-BSD FRML MDRD: >90 ML/MIN/{1.73_M2}
GLUCOSE SERPL-MCNC: 86 MG/DL (ref 70–99)
POTASSIUM SERPL-SCNC: 3.9 MMOL/L (ref 3.4–5.3)
SODIUM SERPL-SCNC: 135 MMOL/L (ref 133–144)

## 2020-02-18 PROCEDURE — 36415 COLL VENOUS BLD VENIPUNCTURE: CPT | Performed by: FAMILY MEDICINE

## 2020-02-18 PROCEDURE — 99214 OFFICE O/P EST MOD 30 MIN: CPT | Performed by: FAMILY MEDICINE

## 2020-02-18 PROCEDURE — 80048 BASIC METABOLIC PNL TOTAL CA: CPT | Performed by: FAMILY MEDICINE

## 2020-02-18 RX ORDER — HYDROCHLOROTHIAZIDE 25 MG/1
25 TABLET ORAL DAILY
Qty: 90 TABLET | Refills: 3 | Status: SHIPPED | OUTPATIENT
Start: 2020-02-18 | End: 2021-02-01

## 2020-02-18 ASSESSMENT — ANXIETY QUESTIONNAIRES
1. FEELING NERVOUS, ANXIOUS, OR ON EDGE: NOT AT ALL
7. FEELING AFRAID AS IF SOMETHING AWFUL MIGHT HAPPEN: NOT AT ALL
GAD7 TOTAL SCORE: 0
6. BECOMING EASILY ANNOYED OR IRRITABLE: NOT AT ALL
2. NOT BEING ABLE TO STOP OR CONTROL WORRYING: NOT AT ALL
3. WORRYING TOO MUCH ABOUT DIFFERENT THINGS: NOT AT ALL
5. BEING SO RESTLESS THAT IT IS HARD TO SIT STILL: NOT AT ALL
IF YOU CHECKED OFF ANY PROBLEMS ON THIS QUESTIONNAIRE, HOW DIFFICULT HAVE THESE PROBLEMS MADE IT FOR YOU TO DO YOUR WORK, TAKE CARE OF THINGS AT HOME, OR GET ALONG WITH OTHER PEOPLE: NOT DIFFICULT AT ALL

## 2020-02-18 ASSESSMENT — PATIENT HEALTH QUESTIONNAIRE - PHQ9
5. POOR APPETITE OR OVEREATING: NOT AT ALL
SUM OF ALL RESPONSES TO PHQ QUESTIONS 1-9: 2

## 2020-02-18 ASSESSMENT — MIFFLIN-ST. JEOR: SCORE: 1450.9

## 2020-02-18 NOTE — PROGRESS NOTES
Subjective     Juanita Lechuga is a 54 year old female who presents to clinic today for the following health issues:    HPI   Hypertension Follow-up      Do you check your blood pressure regularly outside of the clinic? Yes     Are you following a low salt diet? Yes    Are your blood pressures ever more than 140 on the top number (systolic) OR more   than 90 on the bottom number (diastolic), for example 140/90? No      How many servings of fruits and vegetables do you eat daily?  0-1    On average, how many sweetened beverages do you drink each day (Examples: soda, juice, sweet tea, etc.  Do NOT count diet or artificially sweetened beverages)?   1    How many days per week do you exercise enough to make your heart beat faster? 3 or less    How many minutes a day do you exercise enough to make your heart beat faster? 30 - 60    How many days per week do you miss taking your medication? 0      Patient Active Problem List   Diagnosis     Old disruption of anterior cruciate ligament     CARDIOVASCULAR SCREENING; LDL GOAL LESS THAN 130     Advanced directives, counseling/discussion     Situational mixed anxiety and depressive disorder     Adjustment disorder with mixed anxiety and depressed mood     Anxiety     Hypertrophy of breast     Benign essential hypertension     Morbid obesity (H)     Chronic pansinusitis     Nasal polyposis     Past Surgical History:   Procedure Laterality Date     BUNIONECTOMY RT/LT  8/7/2008    (L) first toe     BUNIONECTOMY RT/LT  9/20/12    right first toe     OPTICAL TRACKING SYSTEM ENDOSCOPIC SINUS SURGERY Bilateral 10/9/2018    Procedure: OPTICAL TRACKING SYSTEM ENDOSCOPIC SINUS SURGERY;  Image guided endoscopic sinus surgery endoscopic septoplasty;  Surgeon: Micky Almaraz MD;  Location: [facility] OR     SEPTOPLASTY N/A 10/9/2018    Procedure: SEPTOPLASTY;;  Surgeon: Micky Almaraz MD;  Location:  OR       Social History     Tobacco Use     Smoking status: Former Smoker      Packs/day: 0.50     Years: 27.00     Pack years: 13.50     Types: Cigarettes     Last attempt to quit: 10/24/2011     Years since quittin.3     Smokeless tobacco: Former User   Substance Use Topics     Alcohol use: Yes     Comment: 2 drinks a month     Family History   Problem Relation Age of Onset     C.A.D. Father      Coronary Artery Disease Father      Hypertension Father      Hyperlipidemia Father      Cancer Maternal Grandmother         ?         Current Outpatient Medications   Medication Sig Dispense Refill     ALPRAZolam (XANAX) 0.25 MG tablet 1-2 q 6 hours as needed 10 tablet 0     amLODIPine (NORVASC) 2.5 MG tablet Take 1 tablet (2.5 mg) by mouth daily 90 tablet 3     azelastine (ASTEPRO) 0.15 % nasal spray Spray 2 sprays into both nostrils 2 times daily 1 Bottle 12     carvedilol ER (COREG CR) 40 MG 24 hr capsule TAKE ONE CAPSULE BY MOUTH ONCE DAILY 90 capsule 3     citalopram (CELEXA) 20 MG tablet TAKE ONE TABLET BY MOUTH ONCE DAILY 90 tablet 4     fluticasone (FLONASE) 50 MCG/ACT nasal spray Spray 2 sprays into both nostrils daily 3 mL 11     hydrALAZINE (APRESOLINE) 25 MG tablet Take one four times a day for high blood pressure 360 tablet 3     hydrochlorothiazide 25 MG PO tablet Take 1 tablet (25 mg) by mouth daily 90 tablet 3     losartan (COZAAR) 100 MG tablet Take 1 tablet (100 mg) by mouth daily 30 tablet 0     MULTI-VITAMIN OR TABS 1 TABLET DAILY       potassium chloride ER (K-DUR/KLOR-CON M) 10 MEQ CR tablet Take 1 tablet (10 mEq) by mouth daily 90 tablet 3     Allergies   Allergen Reactions     Lisinopril Cough     Recent Labs   Lab Test 19  1110 18  1143 18  1241 18  0859 10/05/17  1719 17  1055   *  --   --  124*  --  115*   HDL 78  --   --  62  --  80   TRIG 64  --   --  61  --  71   ALT  --   --   --  30  --   --    CR  --   --   --  0.45*  --  0.48*   GFRESTIMATED  --   --   --  >90  --  >90  Non  GFR Calc     GFRESTBLACK  --   --    --  >90  --  >90  African American GFR Calc     POTASSIUM  --  3.9 3.3* 4.0  --  3.9   TSH  --   --   --   --  2.90 1.84      BP Readings from Last 3 Encounters:   20 132/70   19 138/86   19 144/78    Wt Readings from Last 3 Encounters:   20 92.4 kg (203 lb 9.6 oz)   19 92.6 kg (204 lb 3.2 oz)   19 90.7 kg (200 lb)                    Depression and Anxiety Follow-Up    How are you doing with your depression since your last visit? Stable     How are you doing with your anxiety since your last visit?  Stable     Are you having other symptoms that might be associated with depression or anxiety? No    Have you had a significant life event? No     Do you have any concerns with your use of alcohol or other drugs? No    Social History     Tobacco Use     Smoking status: Former Smoker     Packs/day: 0.50     Years: 27.00     Pack years: 13.50     Types: Cigarettes     Last attempt to quit: 10/24/2011     Years since quittin.3     Smokeless tobacco: Former User   Substance Use Topics     Alcohol use: Yes     Comment: 2 drinks a month     Drug use: No     PHQ 2019   PHQ-9 Total Score 0 4 2   Q9: Thoughts of better off dead/self-harm past 2 weeks Not at all Not at all Not at all     CANDELARIO-7 SCORE 2018   Total Score - - -   Total Score 17 2 0     Last PHQ-9 2020   1.  Little interest or pleasure in doing things 0   2.  Feeling down, depressed, or hopeless 0   3.  Trouble falling or staying asleep, or sleeping too much 1   4.  Feeling tired or having little energy 1   5.  Poor appetite or overeating 0   6.  Feeling bad about yourself 0   7.  Trouble concentrating 0   8.  Moving slowly or restless 0   Q9: Thoughts of better off dead/self-harm past 2 weeks 0   PHQ-9 Total Score 2   Difficulty at work, home, or with people Not difficult at all     CANDELARIO-7  2020   1. Feeling nervous, anxious, or on edge 0   2. Not being able to stop or  "control worrying 0   3. Worrying too much about different things 0   4. Trouble relaxing 0   5. Being so restless that it is hard to sit still 0   6. Becoming easily annoyed or irritable 0   7. Feeling afraid, as if something awful might happen 0   CANDELARIO-7 Total Score 0   If you checked any problems, how difficult have they made it for you to do your work, take care of things at home, or get along with other people? Not difficult at all     In the past two weeks have you had thoughts of suicide or self-harm?  No.    Do you have concerns about your personal safety or the safety of others?   No    Suicide Assessment Five-step Evaluation and Treatment (SAFE-T)    Reviewed and updated as needed this visit by Provider  Tobacco  Allergies  Meds  Problems  Med Hx  Surg Hx  Fam Hx         Review of Systems   ROS COMP: CONSTITUTIONAL: NEGATIVE for fever, chills, change in weight  ENT/MOUTH: NEGATIVE for ear, mouth and throat problems  RESP: NEGATIVE for significant cough or SOB  CV: NEGATIVE for chest pain, palpitations or peripheral edema  GI: NEGATIVE for nausea, abdominal pain, heartburn, or change in bowel habits  PSYCHIATRIC: NEGATIVE for changes in mood or affect      Objective    /70   Pulse 81   Temp 96.4  F (35.8  C) (Oral)   Resp 16   Ht 1.549 m (5' 1\")   Wt 92.4 kg (203 lb 9.6 oz)   LMP 12/28/2015 (Approximate)   SpO2 97%   Breastfeeding No   BMI 38.47 kg/m    Body mass index is 38.47 kg/m .  Physical Exam   GENERAL: healthy, alert and no distress  NECK: no adenopathy, no asymmetry, masses, or scars and thyroid normal to palpation  RESP: lungs clear to auscultation - no rales, rhonchi or wheezes  CV: regular rate and rhythm, normal S1 S2, no S3 or S4, no murmur, click or rub, no peripheral edema and peripheral pulses strong  ABDOMEN: soft, nontender, no hepatosplenomegaly, no masses and bowel sounds normal  MS: no gross musculoskeletal defects noted, no edema  SKIN: no suspicious lesions or " "rashes  PSYCH: mentation appears normal, affect normal/bright    Diagnostic Test Results:  Labs reviewed in Epic  Pending         Assessment & Plan     1. Benign essential hypertension  controlled  - hydrochlorothiazide 25 MG PO tablet; Take 1 tablet (25 mg) by mouth daily  Dispense: 90 tablet; Refill: 3  - Basic metabolic panel    2. Anxiety  Stable     3. Morbid obesity (H)  Low gerardo diet/Exercise-advised try to Lose 10 pounds next 3 months    4. Situational mixed anxiety and depressive disorder  Stable        BMI:   Estimated body mass index is 38.47 kg/m  as calculated from the following:    Height as of this encounter: 1.549 m (5' 1\").    Weight as of this encounter: 92.4 kg (203 lb 9.6 oz).   Weight management plan: Discussed healthy diet and exercise guidelines            Return in about 3 months (around 5/18/2020) for Physical Exam.    Mable Cardoso MD  [facility]        "

## 2020-02-19 ASSESSMENT — ANXIETY QUESTIONNAIRES: GAD7 TOTAL SCORE: 0

## 2020-03-30 ENCOUNTER — E-VISIT (OUTPATIENT)
Dept: FAMILY MEDICINE | Facility: CLINIC | Age: 55
End: 2020-03-30
Payer: COMMERCIAL

## 2020-03-30 DIAGNOSIS — R05.9 COUGH: Primary | ICD-10-CM

## 2020-03-30 PROCEDURE — 99421 OL DIG E/M SVC 5-10 MIN: CPT | Performed by: FAMILY MEDICINE

## 2020-03-30 NOTE — PATIENT INSTRUCTIONS
Thank you for choosing us for your care. I have placed an order for a prescription so that you can start treatment. View your full visit summary for details by clicking on the link below. Your pharmacist will able to address any questions you may have about the medication.     If you're not feeling better within 5-7 days, please schedule an appointment.  You can schedule an appointment right here in [place], or call [phone]  If the visit is for the same symptoms as your e-visit, we'll refund the cost of your e-visit if seen within seven days.

## 2020-03-31 ENCOUNTER — E-VISIT (OUTPATIENT)
Dept: FAMILY MEDICINE | Facility: CLINIC | Age: 55
End: 2020-03-31
Payer: COMMERCIAL

## 2020-03-31 DIAGNOSIS — R05.9 COUGH: Primary | ICD-10-CM

## 2020-03-31 PROCEDURE — 99207 ZZC NON-BILLABLE SERV PER CHARTING: CPT | Performed by: FAMILY MEDICINE

## 2020-03-31 RX ORDER — ALBUTEROL SULFATE 90 UG/1
2 AEROSOL, METERED RESPIRATORY (INHALATION) EVERY 6 HOURS
Qty: 8 G | Refills: 0 | Status: SHIPPED | OUTPATIENT
Start: 2020-03-31 | End: 2020-04-15

## 2020-03-31 NOTE — PATIENT INSTRUCTIONS
Thank you for choosing us for your care. I have placed an order for a prescription so that you can start treatment. View your full visit summary for details by clicking on the link below. Your pharmacist will able to address any questions you may have about the medication.     If you're not feeling better within 5-7 days, please schedule an appointment.  You can schedule an appointment right here in [place], or call [phone]  If the visit is for the same symptoms as your e-visit, we'll refund the cost of your e-visit if seen within seven days.

## 2020-04-14 ENCOUNTER — MYC MEDICAL ADVICE (OUTPATIENT)
Dept: FAMILY MEDICINE | Facility: CLINIC | Age: 55
End: 2020-04-14

## 2020-04-14 NOTE — TELEPHONE ENCOUNTER
Patient is scheduled for a phone visit 4/15/2020. Teri Crystal,    (2) assistive person (3) assistive equipment and person

## 2020-04-15 ENCOUNTER — VIRTUAL VISIT (OUTPATIENT)
Dept: FAMILY MEDICINE | Facility: CLINIC | Age: 55
End: 2020-04-15
Payer: COMMERCIAL

## 2020-04-15 DIAGNOSIS — J44.9 CHRONIC OBSTRUCTIVE PULMONARY DISEASE, UNSPECIFIED COPD TYPE (H): Primary | ICD-10-CM

## 2020-04-15 DIAGNOSIS — R05.9 COUGH: ICD-10-CM

## 2020-04-15 DIAGNOSIS — I10 BENIGN ESSENTIAL HYPERTENSION: ICD-10-CM

## 2020-04-15 PROCEDURE — 99214 OFFICE O/P EST MOD 30 MIN: CPT | Mod: 95 | Performed by: FAMILY MEDICINE

## 2020-04-15 RX ORDER — LOSARTAN POTASSIUM 100 MG/1
100 TABLET ORAL DAILY
Qty: 30 TABLET | Refills: 0 | Status: SHIPPED | OUTPATIENT
Start: 2020-04-15 | End: 2020-04-15

## 2020-04-15 RX ORDER — LOSARTAN POTASSIUM 100 MG/1
100 TABLET ORAL DAILY
Qty: 90 TABLET | Refills: 3 | Status: SHIPPED | OUTPATIENT
Start: 2020-04-15 | End: 2021-05-03

## 2020-04-15 RX ORDER — ALBUTEROL SULFATE 90 UG/1
2 AEROSOL, METERED RESPIRATORY (INHALATION) EVERY 6 HOURS
Qty: 8 G | Refills: 0 | Status: SHIPPED | OUTPATIENT
Start: 2020-04-15 | End: 2020-04-15

## 2020-04-15 RX ORDER — ALBUTEROL SULFATE 90 UG/1
2 AEROSOL, METERED RESPIRATORY (INHALATION) EVERY 6 HOURS
Qty: 8 G | Refills: 1 | Status: SHIPPED | OUTPATIENT
Start: 2020-04-15 | End: 2020-05-21

## 2020-04-15 RX ORDER — TIOTROPIUM BROMIDE 18 UG/1
18 CAPSULE ORAL; RESPIRATORY (INHALATION) DAILY
Qty: 30 CAPSULE | Refills: 1 | Status: SHIPPED | OUTPATIENT
Start: 2020-04-15 | End: 2020-05-27

## 2020-04-15 NOTE — PROGRESS NOTES
"Juanita Lechuga is a 54 year old female who is being evaluated via a billable telephone visit.      The patient has been notified of following:     \"This telephone visit will be conducted via a call between you and your physician/provider. We have found that certain health care needs can be provided without the need for a physical exam.  This service lets us provide the care you need with a short phone conversation.  If a prescription is necessary we can send it directly to your pharmacy.  If lab work is needed we can place an order for that and you can then stop by our lab to have the test done at a later time.    Telephone visits are billed at different rates depending on your insurance coverage. During this emergency period, for some insurers they may be billed the same as an in-person visit.  Please reach out to your insurance provider with any questions.    If during the course of the call the physician/provider feels a telephone visit is not appropriate, you will not be charged for this service.\"    Patient has given verbal consent for Telephone visit?  Yes    How would you like to obtain your AVS? [place]  10:43 AM  10:56 AM      Subjective     Juanita Lechuga is a 54 year old female who presents to clinic today for the following health issues:    RESPIRATORY SYMPTOMS      Duration: off for 1 year    Has Not had any Xray    Has Not become worse     Mild wheezing    Description  cough and wheezing mild    Severity: mild to moderate    Accompanying signs and symptoms: None    History (predisposing factors):  none    Precipitating or alleviating factors: exercise makes it work.     Therapies tried and outcome:  None    Pt was Diagnosed as having COPD by Dr Cardona in 2013    She used to be on spriva which helpes    Smoker From [age]-quit 8 years ago-10 cigarettes daily    Cough nonprodutive     No sob     has chronic nasal congestion    Cough Intermittent    Has some postnasal drip  No new cough          Patient " Active Problem List   Diagnosis     Old disruption of anterior cruciate ligament     CARDIOVASCULAR SCREENING; LDL GOAL LESS THAN 130     Advanced directives, counseling/discussion     Situational mixed anxiety and depressive disorder     Adjustment disorder with mixed anxiety and depressed mood     Anxiety     Hypertrophy of breast     Benign essential hypertension     Morbid obesity (H)     Chronic pansinusitis     Nasal polyposis     Past Surgical History:   Procedure Laterality Date     BUNIONECTOMY RT/LT  2008    (L) first toe     BUNIONECTOMY RT/LT  12    right first toe     OPTICAL TRACKING SYSTEM ENDOSCOPIC SINUS SURGERY Bilateral 10/9/2018    Procedure: OPTICAL TRACKING SYSTEM ENDOSCOPIC SINUS SURGERY;  Image guided endoscopic sinus surgery endoscopic septoplasty;  Surgeon: Micky Almaraz MD;  Location: [facility] OR     SEPTOPLASTY N/A 10/9/2018    Procedure: SEPTOPLASTY;;  Surgeon: Micky Almaraz MD;  Location: [facility] OR       Social History     Tobacco Use     Smoking status: Former Smoker     Packs/day: 0.50     Years: 27.00     Pack years: 13.50     Types: Cigarettes     Last attempt to quit: 10/24/2011     Years since quittin.4     Smokeless tobacco: Former User   Substance Use Topics     Alcohol use: Yes     Comment: 2 drinks a month     Family History   Problem Relation Age of Onset     C.A.D. Father      Coronary Artery Disease Father      Hypertension Father      Hyperlipidemia Father      Cancer Maternal Grandmother         ?         Current Outpatient Medications   Medication Sig Dispense Refill     albuterol (PROAIR HFA/PROVENTIL HFA/VENTOLIN HFA) 108 (90 Base) MCG/ACT inhaler Inhale 2 puffs into the lungs every 6 hours 8 g 1     ALPRAZolam (XANAX) 0.25 MG tablet 1-2 q 6 hours as needed 10 tablet 0     amLODIPine (NORVASC) 2.5 MG tablet Take 1 tablet (2.5 mg) by mouth daily 90 tablet 3     azelastine (ASTEPRO) 0.15 % nasal spray Spray 2 sprays into both nostrils 2 times daily 1  Bottle 12     carvedilol ER (COREG CR) 40 MG 24 hr capsule TAKE ONE CAPSULE BY MOUTH ONCE DAILY 90 capsule 3     citalopram (CELEXA) 20 MG tablet TAKE ONE TABLET BY MOUTH ONCE DAILY 90 tablet 4     fluticasone (FLONASE) 50 MCG/ACT nasal spray Spray 2 sprays into both nostrils daily 3 mL 11     hydrALAZINE (APRESOLINE) 25 MG tablet Take one four times a day for high blood pressure 360 tablet 3     hydrochlorothiazide 25 MG PO tablet Take 1 tablet (25 mg) by mouth daily 90 tablet 3     losartan (COZAAR) 100 MG tablet Take 1 tablet (100 mg) by mouth daily 90 tablet 3     MULTI-VITAMIN OR TABS 1 TABLET DAILY       potassium chloride ER (K-DUR/KLOR-CON M) 10 MEQ CR tablet Take 1 tablet (10 mEq) by mouth daily 90 tablet 3     Probiotic Product (PROBIOTIC-10) CHEW        tiotropium (SPIRIVA) 18 MCG inhaled capsule Inhale 1 capsule (18 mcg) into the lungs daily 30 capsule 1     Allergies   Allergen Reactions     Lisinopril Cough     Recent Labs   Lab Test 02/18/20  1529 04/29/19  1110 09/04/18  1143  03/23/18  0859 10/05/17  1719 02/24/17  1055   LDL  --  124*  --   --  124*  --  115*   HDL  --  78  --   --  62  --  80   TRIG  --  64  --   --  61  --  71   ALT  --   --   --   --  30  --   --    CR 0.47*  --   --   --  0.45*  --  0.48*   GFRESTIMATED >90  --   --   --  >90  --  >90  Non  GFR Calc     GFRESTBLACK >90  --   --   --  >90  --  >90  African American GFR Calc     POTASSIUM 3.9  --  3.9   < > 4.0  --  3.9   TSH  --   --   --   --   --  2.90 1.84    < > = values in this interval not displayed.      BP Readings from Last 3 Encounters:   02/18/20 132/70   07/09/19 138/86   05/13/19 144/78    Wt Readings from Last 3 Encounters:   02/18/20 92.4 kg (203 lb 9.6 oz)   07/09/19 92.6 kg (204 lb 3.2 oz)   04/29/19 90.7 kg (200 lb)                    Reviewed and updated as needed this visit by Provider  Tobacco  Allergies  Meds  Problems  Med Hx  Surg Hx  Fam Hx         Review of Systems   ROS COMP:  CONSTITUTIONAL: NEGATIVE for fever, chills, change in weight  ENT/MOUTH: NEGATIVE for ear, mouth and throat problems  RESP: as above  CV: NEGATIVE for chest pain, palpitations or peripheral edema  GI: NEGATIVE for nausea, abdominal pain, heartburn, or change in bowel habits  MUSCULOSKELETAL: NEGATIVE for significant arthralgias or myalgia  Rest of the ROS is Negative except see above and Problem list [stable]         Objective   Reported vitals:  [facility] 12/28/2015 (Approximate)    healthy, alert and no distress  PSYCH: Alert and oriented times 3; coherent speech, normal   rate and volume, able to articulate logical thoughts, able   to abstract reason, no tangential thoughts, no hallucinations   or delusions  Her affect is normal  RESP: No cough, no audible wheezing, able to talk in full sentences  Remainder of exam unable to be completed due to telephone visits    Diagnostic Test Results:  none         Assessment/Plan:  1. Benign essential hypertension  Stable   - losartan (COZAAR) 100 MG tablet; Take 1 tablet (100 mg) by mouth daily  Dispense: 90 tablet; Refill: 3    2. Cough    - albuterol (PROAIR HFA/PROVENTIL HFA/VENTOLIN HFA) 108 (90 Base) MCG/ACT inhaler; Inhale 2 puffs into the lungs every 6 hours  Dispense: 8 g; Refill: 1    3. Chronic obstructive pulmonary disease, unspecified COPD type (H)  Pt has COPD per previous records-she has stopped smoking 8 years ago  - XR Chest 2 Views; Future  - tiotropium (SPIRIVA) 18 MCG inhaled capsule; Inhale 1 capsule (18 mcg) into the lungs daily  Dispense: 30 capsule; Refill: 1  Advised start  Take albuterol as needed  Advised CXR-in clinic in the next 2-3 weeks  See me 2 months-sooner if worse  Return in about 2 months (around 6/15/2020) for recheck.      Phone call duration:  12  minutes    Mable Cardoso MD

## 2020-05-14 ENCOUNTER — ANCILLARY PROCEDURE (OUTPATIENT)
Dept: GENERAL RADIOLOGY | Facility: CLINIC | Age: 55
End: 2020-05-14
Attending: FAMILY MEDICINE
Payer: COMMERCIAL

## 2020-05-14 DIAGNOSIS — J44.9 CHRONIC OBSTRUCTIVE PULMONARY DISEASE, UNSPECIFIED COPD TYPE (H): ICD-10-CM

## 2020-05-14 PROCEDURE — 71046 X-RAY EXAM CHEST 2 VIEWS: CPT

## 2020-05-19 DIAGNOSIS — R05.9 COUGH: ICD-10-CM

## 2020-05-21 RX ORDER — ALBUTEROL SULFATE 90 UG/1
AEROSOL, METERED RESPIRATORY (INHALATION)
Qty: 18 G | Refills: 0 | Status: SHIPPED | OUTPATIENT
Start: 2020-05-21 | End: 2021-03-01

## 2020-05-26 ENCOUNTER — TELEPHONE (OUTPATIENT)
Dept: FAMILY MEDICINE | Facility: CLINIC | Age: 55
End: 2020-05-26

## 2020-05-26 RX ORDER — HYDROCHLOROTHIAZIDE 25 MG/1
25 TABLET ORAL DAILY
Qty: 90 TABLET | Refills: 3 | Status: CANCELLED | OUTPATIENT
Start: 2020-05-26

## 2020-05-26 RX ORDER — AMLODIPINE BESYLATE 2.5 MG/1
2.5 TABLET ORAL DAILY
Qty: 90 TABLET | Refills: 3 | Status: CANCELLED | OUTPATIENT
Start: 2020-05-26

## 2020-05-26 RX ORDER — HYDRALAZINE HYDROCHLORIDE 25 MG/1
TABLET, FILM COATED ORAL
Qty: 360 TABLET | Refills: 3 | Status: CANCELLED | OUTPATIENT
Start: 2020-05-26

## 2020-05-26 RX ORDER — CARVEDILOL PHOSPHATE 40 MG/1
CAPSULE, EXTENDED RELEASE ORAL
Qty: 90 CAPSULE | Refills: 3 | Status: CANCELLED | OUTPATIENT
Start: 2020-05-26

## 2020-05-26 RX ORDER — LOSARTAN POTASSIUM 100 MG/1
100 TABLET ORAL DAILY
Qty: 90 TABLET | Refills: 3 | Status: CANCELLED | OUTPATIENT
Start: 2020-05-26

## 2020-05-26 RX ORDER — CITALOPRAM HYDROBROMIDE 20 MG/1
TABLET ORAL
Qty: 90 TABLET | Refills: 4 | Status: CANCELLED | OUTPATIENT
Start: 2020-05-26

## 2020-05-26 NOTE — TELEPHONE ENCOUNTER
Called and spoke with patient. She has been using Spiriva 18 mcg daily but doesn't feel like it's working very well when she is at work moving around. States she feels like she has a hard time catching her breath and has to use her albuterol inhaler more frequently. Has been using it around every 3-4 hours while at work. She does have to wear a mask while at work so that makes it worse. Feels OK when she is at home or sitting at work, but exertion makes it worse.   Patient wondering if she should try a different inhaler to see if that helps better. Reports her work did offer for her to stay home for a couple of weeks to see if that helps her symptoms or while she starts a new medication.     Please advise.     Marcia Banerjee RN

## 2020-05-26 NOTE — PROGRESS NOTES
"Juanita Lechuga is a 54 year old female who is being evaluated via a billable telephone visit.      The patient has been notified of following:     \"This telephone visit will be conducted via a call between you and your physician/provider. We have found that certain health care needs can be provided without the need for a physical exam.  This service lets us provide the care you need with a short phone conversation.  If a prescription is necessary we can send it directly to your pharmacy.  If lab work is needed we can place an order for that and you can then stop by our lab to have the test done at a later time.    Telephone visits are billed at different rates depending on your insurance coverage. During this emergency period, for some insurers they may be billed the same as an in-person visit.  Please reach out to your insurance provider with any questions.    If during the course of the call the physician/provider feels a telephone visit is not appropriate, you will not be charged for this service.\"    Patient has given verbal consent for Telephone visit?  Yes    What phone number would you like to be contacted at? [phone]    How would you like to obtain your AVS? [facility]   8:28 AM  Start visit    Subjective     Juanita Lechuga is a 54 year old female who presents via phone visit today for the following health issues:    HPI  Hyperlipidemia Follow-Up      Are you regularly taking any medication or supplement to lower your cholesterol?   No    Are you having muscle aches or other side effects that you think could be caused by your cholesterol lowering medication?  No    COPD Follow-Up    Overall, how are your COPD symptoms since your last clinic visit?  Slightly worse    How much fatigue or shortness of breath do you have when you are walking?  Feels her copd is a little worse  Due to Humidity    Hard to wear mask at work    How much shortness of breath do you have when you are resting?  Has " allergies    Wearing mask at work does not help    spiriva has not helped much    How often do you cough? Rarely    Have you noticed any change in your sputum/phlegm?  No    Have you experienced a recent fever? No    Please describe how far you can walk without stopping to rest:  2-5 blocks    How many flights of stairs are you able to walk up without stopping?  1    Have you had any Emergency Room Visits, Urgent Care Visits, or Hospital Admissions because of your COPD since your last office visit?  No    History   Smoking Status     Former Smoker     Packs/day: 0.50     Years: 27.00     Types: Cigarettes     Quit date: 10/24/2011   Smokeless Tobacco     Former User     Lab Results   Component Value Date    FEV1 96 2013    CBV1HBA 86 2013           Social History     Tobacco Use     Smoking status: Former Smoker     Packs/day: 0.50     Years: 27.00     Pack years: 13.50     Types: Cigarettes     Last attempt to quit: 10/24/2011     Years since quittin.5     Smokeless tobacco: Former User   Substance Use Topics     Alcohol use: Yes     Comment: 2 drinks a month     Drug use: No     CANDELARIO-7 SCORE 2019   Total Score - - -   Total Score 2 0 4     PHQ 2019   PHQ-9 Total Score 4 2 1   Q9: Thoughts of better off dead/self-harm past 2 weeks Not at all Not at all Not at all         How many servings of fruits and vegetables do you eat daily?  2-3    On average, how many sweetened beverages do you drink each day (Examples: soda, juice, sweet tea, etc.  Do NOT count diet or artificially sweetened beverages)?   1    How many days per week do you exercise enough to make your heart beat faster? 3 or less    How many minutes a day do you exercise enough to make your heart beat faster? 20 - 29    How many days per week do you miss taking your medication? 0     1. Patient would like a note from her doctor excusing from work.    Patient Active Problem List   Diagnosis      Old disruption of anterior cruciate ligament     CARDIOVASCULAR SCREENING; LDL GOAL LESS THAN 130     Advanced directives, counseling/discussion     Situational mixed anxiety and depressive disorder     Adjustment disorder with mixed anxiety and depressed mood     Anxiety     Hypertrophy of breast     Benign essential hypertension     Morbid obesity (H)     Chronic pansinusitis     Nasal polyposis     Past Surgical History:   Procedure Laterality Date     BUNIONECTOMY RT/LT  2008    (L) first toe     BUNIONECTOMY RT/LT  12    right first toe     OPTICAL TRACKING SYSTEM ENDOSCOPIC SINUS SURGERY Bilateral 10/9/2018    Procedure: OPTICAL TRACKING SYSTEM ENDOSCOPIC SINUS SURGERY;  Image guided endoscopic sinus surgery endoscopic septoplasty;  Surgeon: Micky Almaraz MD;  Location: [facility] OR     SEPTOPLASTY N/A 10/9/2018    Procedure: SEPTOPLASTY;;  Surgeon: Micky Almaraz MD;  Location: [facility] OR       Social History     Tobacco Use     Smoking status: Former Smoker     Packs/day: 0.50     Years: 27.00     Pack years: 13.50     Types: Cigarettes     Last attempt to quit: 10/24/2011     Years since quittin.5     Smokeless tobacco: Former User   Substance Use Topics     Alcohol use: Yes     Comment: 2 drinks a month     Family History   Problem Relation Age of Onset     C.A.D. Father      Coronary Artery Disease Father      Hypertension Father      Hyperlipidemia Father      Cancer Maternal Grandmother         ?         Current Outpatient Medications   Medication Sig Dispense Refill     albuterol (PROAIR HFA/PROVENTIL HFA/VENTOLIN HFA) 108 (90 Base) MCG/ACT inhaler INHALE 2 PUFFS BY MOUTH EVERY 6 HOURS 18 g 0     ALPRAZolam (XANAX) 0.25 MG tablet 1-2 q 6 hours as needed 10 tablet 0     amLODIPine (NORVASC) 2.5 MG tablet Take 1 tablet (2.5 mg) by mouth daily 90 tablet 3     azelastine (ASTEPRO) 0.15 % nasal spray Spray 2 sprays into both nostrils 2 times daily 1 Bottle 12     carvedilol ER (COREG CR) 40  MG 24 hr capsule TAKE ONE CAPSULE BY MOUTH ONCE DAILY 90 capsule 3     citalopram (CELEXA) 20 MG tablet TAKE ONE TABLET BY MOUTH ONCE DAILY 90 tablet 4     fluticasone (FLONASE) 50 MCG/ACT nasal spray Spray 2 sprays into both nostrils daily 3 mL 11     hydrALAZINE (APRESOLINE) 25 MG tablet Take one four times a day for high blood pressure 360 tablet 3     hydrochlorothiazide 25 MG PO tablet Take 1 tablet (25 mg) by mouth daily 90 tablet 3     losartan (COZAAR) 100 MG tablet Take 1 tablet (100 mg) by mouth daily 90 tablet 3     mometasone-formoterol (DULERA) 200-5 MCG/ACT inhaler Inhale 2 puffs into the lungs 2 times daily 1 Inhaler 3     MULTI-VITAMIN OR TABS 1 TABLET DAILY       potassium chloride ER (K-DUR/KLOR-CON M) 10 MEQ CR tablet Take 1 tablet (10 mEq) by mouth daily 90 tablet 3     Probiotic Product (PROBIOTIC-10) CHEW        tiotropium (SPIRIVA) 18 MCG inhaled capsule Inhale 1 capsule (18 mcg) into the lungs daily 90 capsule 1     Allergies   Allergen Reactions     Lisinopril Cough     Recent Labs   Lab Test 02/18/20  1529 04/29/19  1110 09/04/18  1143  03/23/18  0859 10/05/17  1719 02/24/17  1055   LDL  --  124*  --   --  124*  --  115*   HDL  --  78  --   --  62  --  80   TRIG  --  64  --   --  61  --  71   ALT  --   --   --   --  30  --   --    CR 0.47*  --   --   --  0.45*  --  0.48*   GFRESTIMATED >90  --   --   --  >90  --  >90  Non  GFR Calc     GFRESTBLACK >90  --   --   --  >90  --  >90  African American GFR Calc     POTASSIUM 3.9  --  3.9   < > 4.0  --  3.9   TSH  --   --   --   --   --  2.90 1.84    < > = values in this interval not displayed.      BP Readings from Last 3 Encounters:   02/18/20 132/70   07/09/19 138/86   05/13/19 144/78    Wt Readings from Last 3 Encounters:   02/18/20 92.4 kg (203 lb 9.6 oz)   07/09/19 92.6 kg (204 lb 3.2 oz)   04/29/19 90.7 kg (200 lb)                    Reviewed and updated as needed this visit by Provider  Tobacco  Allergies  Meds  Problems   Med Hx  Surg Hx  Fam Hx         Review of Systems   CONSTITUTIONAL: NEGATIVE for fever, chills, change in weight  ENT/MOUTH: as above  RESP:as above  CV: NEGATIVE for chest pain, palpitations or peripheral edema    Rest of the ROS is Negative except see above and Problem list [stable]      Objective   Reported vitals:  [facility] 12/28/2015 (Approximate)    alert  PSYCH: Alert and oriented times 3; coherent speech, normal   rate and volume, able to articulate logical thoughts, able   to abstract reason, no tangential thoughts, no hallucinations   or delusions  Her affect is normal  RESP: No cough, no audible wheezing, able to talk in full sentences  Remainder of exam unable to be completed due to telephone visits    Diagnostic Test Results:  Labs reviewed in Epic        Assessment/Plan:  1. Chronic obstructive pulmonary disease, unspecified COPD type (H)  Add dulera  Continue spiriva  Follow up 1 week if not better/sooner if worse    - mometasone-formoterol (DULERA) 200-5 MCG/ACT inhaler; Inhale 2 puffs into the lungs 2 times daily  Dispense: 1 Inhaler; Refill: 3  - tiotropium (SPIRIVA) 18 MCG inhaled capsule; Inhale 1 capsule (18 mcg) into the lungs daily  Dispense: 90 capsule; Refill: 1    2. Situational mixed anxiety and depressive disorder  Continue same medicines      3. Nasal polyposis  Continue Flonase  Follow up 1 month  8:45 AM  End visit    Return in about 1 month (around 6/27/2020) for recheck.    Note for work given per pt request  Phone call duration:  As above/ minutes    Mable Cardoso MD

## 2020-05-26 NOTE — TELEPHONE ENCOUNTER
Reason for Call:  Other prescription    Detailed comments: Patient wants to know if she can get an alternative medication for tiotropium (SPIRIVA) 18 MCG due to medication not effective.    Phone Number Patient can be reached at: Home number on file [phone] (home)    Best Time: any    Can we leave a detailed message on this number? YES    Call taken on 5/26/2020 at 7:57 AM by Daniel Yepez

## 2020-05-27 ENCOUNTER — VIRTUAL VISIT (OUTPATIENT)
Dept: FAMILY MEDICINE | Facility: CLINIC | Age: 55
End: 2020-05-27
Payer: COMMERCIAL

## 2020-05-27 DIAGNOSIS — F43.23 SITUATIONAL MIXED ANXIETY AND DEPRESSIVE DISORDER: ICD-10-CM

## 2020-05-27 DIAGNOSIS — J33.9 NASAL POLYPOSIS: ICD-10-CM

## 2020-05-27 DIAGNOSIS — J44.9 CHRONIC OBSTRUCTIVE PULMONARY DISEASE, UNSPECIFIED COPD TYPE (H): Primary | ICD-10-CM

## 2020-05-27 PROCEDURE — 96127 BRIEF EMOTIONAL/BEHAV ASSMT: CPT | Performed by: FAMILY MEDICINE

## 2020-05-27 PROCEDURE — 99214 OFFICE O/P EST MOD 30 MIN: CPT | Mod: 95 | Performed by: FAMILY MEDICINE

## 2020-05-27 RX ORDER — TIOTROPIUM BROMIDE 18 UG/1
18 CAPSULE ORAL; RESPIRATORY (INHALATION) DAILY
Qty: 90 CAPSULE | Refills: 1 | Status: SHIPPED | OUTPATIENT
Start: 2020-05-27 | End: 2021-03-02

## 2020-05-27 ASSESSMENT — ANXIETY QUESTIONNAIRES
IF YOU CHECKED OFF ANY PROBLEMS ON THIS QUESTIONNAIRE, HOW DIFFICULT HAVE THESE PROBLEMS MADE IT FOR YOU TO DO YOUR WORK, TAKE CARE OF THINGS AT HOME, OR GET ALONG WITH OTHER PEOPLE: NOT DIFFICULT AT ALL
GAD7 TOTAL SCORE: 4
1. FEELING NERVOUS, ANXIOUS, OR ON EDGE: SEVERAL DAYS
3. WORRYING TOO MUCH ABOUT DIFFERENT THINGS: NOT AT ALL
6. BECOMING EASILY ANNOYED OR IRRITABLE: NOT AT ALL
7. FEELING AFRAID AS IF SOMETHING AWFUL MIGHT HAPPEN: SEVERAL DAYS
2. NOT BEING ABLE TO STOP OR CONTROL WORRYING: SEVERAL DAYS
5. BEING SO RESTLESS THAT IT IS HARD TO SIT STILL: NOT AT ALL

## 2020-05-27 ASSESSMENT — PATIENT HEALTH QUESTIONNAIRE - PHQ9
SUM OF ALL RESPONSES TO PHQ QUESTIONS 1-9: 1
5. POOR APPETITE OR OVEREATING: SEVERAL DAYS

## 2020-05-27 NOTE — LETTER
[facility]  [address] [place] [id number]  [phone]          May 27, 2020    RE:  Juanita Lechuga                                                                                                                                                       [address]  [facility] [address]            To whom it may concern:    Juanita Lechuga is under my professional care  And unable to go to work 1 week  Sincerely,        Mable Cardoso MD

## 2020-05-27 NOTE — LETTER
[facility]  [address] [place] [id number]  [phone]          May 27, 2020      RE:  Juanita Lechuga                                                                                                                                                    [address]  [facility] [address]                To whom it may concern:      Juanita Lechuga is under my professional care  And unable to go to work for 1 week        Sincerely,      Mable Cardoso MD

## 2020-05-28 ASSESSMENT — ANXIETY QUESTIONNAIRES: GAD7 TOTAL SCORE: 4

## 2020-05-29 ENCOUNTER — TELEPHONE (OUTPATIENT)
Dept: FAMILY MEDICINE | Facility: CLINIC | Age: 55
End: 2020-05-29

## 2020-05-29 NOTE — TELEPHONE ENCOUNTER
Patient had an appointment on 4/15/2020 5/27/2020 for COPD. Patient had an E-visit for a cough on 3/30/2020 & 3/31/2020.    Provider took the patient off of work for 1 week on 5/27/2020.    Teri Crystal,     Forms at the providers desk.

## 2020-05-29 NOTE — TELEPHONE ENCOUNTER
Form came in via fax to be completed by the provider: [facility] Disability and Leave [employer] [place]     Who is the form from? Insurance comp  This was faxed to [facility]  Where was the form placed? Given to physician  What number is listed as a contact on the form? Phone: [phone]    Please fax to [phone]    Additional comments: Short Term Disability     Type of letter, form or note: [place]

## 2020-06-11 PROBLEM — J43.8 OTHER EMPHYSEMA (H): Status: ACTIVE | Noted: 2020-06-11

## 2020-06-12 NOTE — TELEPHONE ENCOUNTER
Attending Physician's Statement (Gabrielle) completed and signed by Dr. Cardoso and faxed back to [phone].  Maria Dolores Atkinson,

## 2020-06-30 DIAGNOSIS — I10 BENIGN ESSENTIAL HYPERTENSION: ICD-10-CM

## 2020-07-01 RX ORDER — HYDRALAZINE HYDROCHLORIDE 25 MG/1
TABLET, FILM COATED ORAL
Qty: 360 TABLET | Refills: 1 | Status: SHIPPED | OUTPATIENT
Start: 2020-07-01 | End: 2021-01-04

## 2020-07-01 RX ORDER — CARVEDILOL PHOSPHATE 40 MG/1
CAPSULE, EXTENDED RELEASE ORAL
Qty: 90 CAPSULE | Refills: 1 | Status: SHIPPED | OUTPATIENT
Start: 2020-07-01 | End: 2020-12-18

## 2020-07-14 ENCOUNTER — ANCILLARY PROCEDURE (OUTPATIENT)
Dept: MAMMOGRAPHY | Facility: CLINIC | Age: 55
End: 2020-07-14
Attending: FAMILY MEDICINE
Payer: COMMERCIAL

## 2020-07-14 DIAGNOSIS — Z12.31 VISIT FOR SCREENING MAMMOGRAM: ICD-10-CM

## 2020-07-14 PROCEDURE — 77067 SCR MAMMO BI INCL CAD: CPT | Mod: TC

## 2020-08-14 DIAGNOSIS — I10 BENIGN ESSENTIAL HYPERTENSION: ICD-10-CM

## 2020-08-14 RX ORDER — AMLODIPINE BESYLATE 2.5 MG/1
2.5 TABLET ORAL DAILY
Qty: 90 TABLET | Refills: 2 | Status: SHIPPED | OUTPATIENT
Start: 2020-08-14 | End: 2021-03-02

## 2020-09-04 DIAGNOSIS — J32.4 CHRONIC PANSINUSITIS: ICD-10-CM

## 2020-09-04 DIAGNOSIS — J33.9 NASAL POLYPOSIS: ICD-10-CM

## 2020-09-04 RX ORDER — FLUTICASONE PROPIONATE 50 MCG
2 SPRAY, SUSPENSION (ML) NASAL DAILY
Qty: 3 ML | Refills: 11 | Status: SHIPPED | OUTPATIENT
Start: 2020-09-04 | End: 2021-09-11

## 2020-09-04 NOTE — TELEPHONE ENCOUNTER
"Routing refill request to provider for review/approval because:  Last OV 11/26/2018 with Dr. Almaraz    Requested Prescriptions   Pending Prescriptions Disp Refills     fluticasone (FLONASE) 50 MCG/ACT nasal spray 3 mL 11     Sig: Spray 2 sprays into both nostrils daily       Nasal Allergy Protocol Failed - 9/4/2020 10:14 AM        Failed - Recent (12 mo) or future (30 days) visit within the authorizing provider's specialty     Patient has had an office visit with the authorizing provider or a provider within the authorizing providers department within the previous 12 mos or has a future within next 30 days. See \"Patient Info\" tab in inbasket, or \"Choose Columns\" in Meds & Orders section of the refill encounter.              Passed - Patient is age 12 or older        Passed - Medication is active on med list           Marcia Banerjee RN  "

## 2020-09-16 DIAGNOSIS — J44.9 CHRONIC OBSTRUCTIVE PULMONARY DISEASE, UNSPECIFIED COPD TYPE (H): ICD-10-CM

## 2020-09-17 RX ORDER — MOMETASONE FUROATE AND FORMOTEROL FUMARATE DIHYDRATE 200; 5 UG/1; UG/1
AEROSOL RESPIRATORY (INHALATION)
Qty: 13 G | Refills: 0 | Status: SHIPPED | OUTPATIENT
Start: 2020-09-17 | End: 2020-10-21

## 2020-09-17 NOTE — TELEPHONE ENCOUNTER
Routing refill request to provider for review/approval because:        Sharon Katz BSN, RN

## 2020-09-30 ENCOUNTER — OFFICE VISIT (OUTPATIENT)
Dept: FAMILY MEDICINE | Facility: CLINIC | Age: 55
End: 2020-09-30
Payer: COMMERCIAL

## 2020-09-30 VITALS
HEART RATE: 99 BPM | WEIGHT: 220 LBS | SYSTOLIC BLOOD PRESSURE: 132 MMHG | HEIGHT: 61 IN | TEMPERATURE: 97.9 F | DIASTOLIC BLOOD PRESSURE: 60 MMHG | RESPIRATION RATE: 20 BRPM | BODY MASS INDEX: 41.54 KG/M2 | OXYGEN SATURATION: 97 %

## 2020-09-30 DIAGNOSIS — L03.114 CELLULITIS OF LEFT UPPER EXTREMITY: Primary | ICD-10-CM

## 2020-09-30 PROCEDURE — 99213 OFFICE O/P EST LOW 20 MIN: CPT | Performed by: NURSE PRACTITIONER

## 2020-09-30 RX ORDER — CEPHALEXIN 500 MG/1
500 CAPSULE ORAL 4 TIMES DAILY
Qty: 28 CAPSULE | Refills: 0 | Status: SHIPPED | OUTPATIENT
Start: 2020-09-30 | End: 2020-10-07

## 2020-09-30 ASSESSMENT — MIFFLIN-ST. JEOR: SCORE: 1525.29

## 2020-09-30 NOTE — PATIENT INSTRUCTIONS
Patient Education     Cellulitis  Cellulitis is an infection of the deep layers of skin. A break in the skin, such as a cut or scratch, can let bacteria under the skin. If the bacteria get to deep layers of the skin, it can be serious. If not treated, cellulitis can get into the bloodstream and lymph nodes. The infection can then spread throughout the body. This causes serious illness.  Cellulitis causes the affected skin to become red, swollen, warm, and sore. The reddened areas have a visible border. An open sore may leak fluid (pus). You may have a fever, chills, and pain.  Cellulitis is treated with antibiotics taken for 7 to 10 days. An open sore may be cleaned and covered with cool wet gauze. Symptoms should get better 1 to 2 days after treatment is started. Make sure to take all the antibiotics for the full number of days until they are gone. Keep taking the medicine even if your symptoms go away.  Home care  Follow these tips:    Limit the use of the part of your body with cellulitis.     If the infection is on your leg, keep your leg raised while sitting. This will help to reduce swelling.    Take all of the antibiotic medicine exactly as directed until it is gone. Do not miss any doses, especially during the first 7 days. Don t stop taking the medicine when your symptoms get better.    Keep the affected area clean and dry.    Wash your hands with soap and warm water before and after touching your skin. Anyone else who touches your skin should also wash his or her hands. Don't share towels.  Follow-up care  Follow up with your healthcare provider, or as advised. If your infection does not go away on the first antibiotic, your healthcare provider will prescribe a different one.  When to seek medical advice  Call your healthcare provider right away if any of these occur:    Red areas that spread    Swelling or pain that gets worse    Fluid leaking from the skin (pus)    Fever higher of 100.4  F (38.0  C) or  higher after 2 days on antibiotics  Date Last Reviewed: 9/1/2016 2000-2019 [employer]. [address] [place] [address], [place] [address]. All rights reserved. This information is not intended as a substitute for professional medical care. Always follow your healthcare professional's instructions.

## 2020-09-30 NOTE — PROGRESS NOTES
"Subjective     Juanita Lechuga is a 54 year old female who presents to clinic today for the following health issues:    HPI       Concern - Possible Bug bite on left elbow  Onset: noticed about 5 days  Description:redness on left elbow, does not remember a bug bite  Intensity: moderate  Progression of Symptoms:  same  Accompanying Signs & Symptoms: warm to the touch,very itchy at onset, redness, swelling  Previous history of similar problem: none  Precipitating factors:        Worsened by: n/a  Alleviating factors:        Improved by: n/a  Therapies tried and outcome: ibuprofen 800 mg, ice and topical pain cream      Review of Systems   Constitutional, HEENT, cardiovascular, pulmonary, gi and gu systems are negative, except as otherwise noted.      Objective    /60   Pulse 99   Temp 97.9  F (36.6  C) (Oral)   Resp 20   Ht 1.549 m (5' 1\")   Wt 99.8 kg (220 lb)   LMP 12/28/2015 (Approximate)   SpO2 97%   BMI 41.57 kg/m    Body mass index is 41.57 kg/m .  Physical Exam   GENERAL: healthy, alert and no distress  EYES: Eyes grossly normal to inspection, PERRL and conjunctivae and sclerae normal  RESP: lungs clear to auscultation - no rales, rhonchi or wheezes  CV: regular rate and rhythm, normal S1 S2, no S3 or S4, no murmur, click or rub, no peripheral edema and peripheral pulses strong  MS: no gross musculoskeletal defects noted, no edema  SKIN: no suspicious lesions or rashes, erythema, warm - left elbow and plaque - 1-2 cm on left elbow x2  NEURO: Normal strength and tone, mentation intact and speech normal  PSYCH: mentation appears normal, affect normal/bright        Assessment & Plan     Cellulitis of left upper extremity  - cephALEXin (KEFLEX) 500 MG capsule; Take 1 capsule (500 mg) by mouth 4 times daily for 7 days     BMI:   Estimated body mass index is 41.57 kg/m  as calculated from the following:    Height as of this encounter: 1.549 m (5' 1\").    Weight as of this encounter: 99.8 kg (220 lb). "   Weight management plan: Discussed healthy diet and exercise guidelines    Return in about 1 week (around 10/7/2020), or if symptoms worsen or fail to improve.    MORE Davies [facility]

## 2020-10-07 DIAGNOSIS — F43.23 SITUATIONAL MIXED ANXIETY AND DEPRESSIVE DISORDER: ICD-10-CM

## 2020-10-07 DIAGNOSIS — E87.6 HYPOKALEMIA: ICD-10-CM

## 2020-10-07 DIAGNOSIS — F41.9 ANXIETY: ICD-10-CM

## 2020-10-08 RX ORDER — CITALOPRAM HYDROBROMIDE 20 MG/1
TABLET ORAL
Qty: 90 TABLET | Refills: 0 | Status: SHIPPED | OUTPATIENT
Start: 2020-10-08 | End: 2021-01-14

## 2020-10-08 RX ORDER — POTASSIUM CHLORIDE 750 MG/1
TABLET, EXTENDED RELEASE ORAL
Qty: 90 TABLET | Refills: 0 | Status: SHIPPED | OUTPATIENT
Start: 2020-10-08 | End: 2021-01-04

## 2020-10-20 DIAGNOSIS — J44.9 CHRONIC OBSTRUCTIVE PULMONARY DISEASE, UNSPECIFIED COPD TYPE (H): ICD-10-CM

## 2020-10-21 RX ORDER — MOMETASONE FUROATE AND FORMOTEROL FUMARATE DIHYDRATE 200; 5 UG/1; UG/1
AEROSOL RESPIRATORY (INHALATION)
Qty: 13 G | Refills: 0 | Status: SHIPPED | OUTPATIENT
Start: 2020-10-21 | End: 2020-11-20

## 2020-10-21 NOTE — TELEPHONE ENCOUNTER
"Requested Prescriptions   Pending Prescriptions Disp Refills    DULERA 200-5 MCG/ACT inhaler [Pharmacy Med Name: Dulera 200-5 MCG/ACT Inhalation Aerosol] 13 g 0     Sig: Inhale 2 puffs by mouth twice daily       Long-Acting Beta Agonist Inhalers Protocol  Failed - 10/21/2020  8:45 AM        Failed - Order for Serevent, Striverdi, or Foradil and pt has steroid inhaler        Passed - Patient is age 12 or older        Passed - Recent (12 mo) or future (30 days) visit within the authorizing provider's specialty     Patient has had an office visit with the authorizing provider or a provider within the authorizing providers department within the previous 12 mos or has a future within next 30 days. See \"Patient Info\" tab in inbasket, or \"Choose Columns\" in Meds & Orders section of the refill encounter.              Passed - Medication is active on med list       Inhaled Steroids Protocol Passed - 10/21/2020  8:45 AM        Passed - Patient is age 12 or older        Passed - Recent (12 mo) or future (30 days) visit within the authorizing provider's specialty     Patient has had an office visit with the authorizing provider or a provider within the authorizing providers department within the previous 12 mos or has a future within next 30 days. See \"Patient Info\" tab in inbasket, or \"Choose Columns\" in Meds & Orders section of the refill encounter.              Passed - Medication is active on med list                   "

## 2020-11-20 DIAGNOSIS — J44.9 CHRONIC OBSTRUCTIVE PULMONARY DISEASE, UNSPECIFIED COPD TYPE (H): ICD-10-CM

## 2020-11-20 RX ORDER — MOMETASONE FUROATE AND FORMOTEROL FUMARATE DIHYDRATE 200; 5 UG/1; UG/1
AEROSOL RESPIRATORY (INHALATION)
Qty: 13 G | Refills: 0 | Status: SHIPPED | OUTPATIENT
Start: 2020-11-20 | End: 2020-12-18

## 2020-12-13 ENCOUNTER — HEALTH MAINTENANCE LETTER (OUTPATIENT)
Age: 55
End: 2020-12-13

## 2020-12-17 DIAGNOSIS — J44.9 CHRONIC OBSTRUCTIVE PULMONARY DISEASE, UNSPECIFIED COPD TYPE (H): ICD-10-CM

## 2020-12-17 DIAGNOSIS — I10 BENIGN ESSENTIAL HYPERTENSION: ICD-10-CM

## 2020-12-18 RX ORDER — MOMETASONE FUROATE AND FORMOTEROL FUMARATE DIHYDRATE 200; 5 UG/1; UG/1
AEROSOL RESPIRATORY (INHALATION)
Qty: 13 G | Refills: 0 | Status: SHIPPED | OUTPATIENT
Start: 2020-12-18 | End: 2021-01-21

## 2020-12-18 RX ORDER — CARVEDILOL PHOSPHATE 40 MG/1
CAPSULE, EXTENDED RELEASE ORAL
Qty: 90 CAPSULE | Refills: 0 | Status: SHIPPED | OUTPATIENT
Start: 2020-12-18 | End: 2021-03-02

## 2021-01-01 DIAGNOSIS — E87.6 HYPOKALEMIA: ICD-10-CM

## 2021-01-01 DIAGNOSIS — I10 BENIGN ESSENTIAL HYPERTENSION: ICD-10-CM

## 2021-01-04 RX ORDER — POTASSIUM CHLORIDE 750 MG/1
TABLET, EXTENDED RELEASE ORAL
Qty: 90 TABLET | Refills: 0 | Status: SHIPPED | OUTPATIENT
Start: 2021-01-04 | End: 2021-03-02

## 2021-01-04 RX ORDER — HYDRALAZINE HYDROCHLORIDE 25 MG/1
TABLET, FILM COATED ORAL
Qty: 360 TABLET | Refills: 0 | Status: SHIPPED | OUTPATIENT
Start: 2021-01-04 | End: 2021-03-02

## 2021-01-20 DIAGNOSIS — J44.9 CHRONIC OBSTRUCTIVE PULMONARY DISEASE, UNSPECIFIED COPD TYPE (H): ICD-10-CM

## 2021-01-21 RX ORDER — MOMETASONE FUROATE AND FORMOTEROL FUMARATE DIHYDRATE 200; 5 UG/1; UG/1
AEROSOL RESPIRATORY (INHALATION)
Qty: 13 G | Refills: 0 | Status: SHIPPED | OUTPATIENT
Start: 2021-01-21 | End: 2021-02-23

## 2021-01-30 ENCOUNTER — TELEPHONE (OUTPATIENT)
Dept: FAMILY MEDICINE | Facility: CLINIC | Age: 56
End: 2021-01-30

## 2021-01-30 DIAGNOSIS — I10 BENIGN ESSENTIAL HYPERTENSION: ICD-10-CM

## 2021-02-01 RX ORDER — HYDROCHLOROTHIAZIDE 25 MG/1
25 TABLET ORAL DAILY
Qty: 90 TABLET | Refills: 0 | Status: SHIPPED | OUTPATIENT
Start: 2021-02-01 | End: 2021-02-08

## 2021-02-01 NOTE — CONFIDENTIAL NOTE
Medication is being filled for 1 time refill only due to:  Patient needs to be seen because +++NEED ANNUAL EXAM+++.

## 2021-02-03 DIAGNOSIS — J33.9 NASAL POLYPOSIS: ICD-10-CM

## 2021-02-03 DIAGNOSIS — J31.0 CHRONIC RHINITIS: ICD-10-CM

## 2021-02-03 RX ORDER — AZELASTINE HCL 205.5 UG/1
2 SPRAY NASAL 2 TIMES DAILY
Qty: 30 ML | Refills: 11 | Status: SHIPPED | OUTPATIENT
Start: 2021-02-03 | End: 2022-02-25

## 2021-02-04 ENCOUNTER — MYC MEDICAL ADVICE (OUTPATIENT)
Dept: FAMILY MEDICINE | Facility: CLINIC | Age: 56
End: 2021-02-04

## 2021-02-08 RX ORDER — HYDROCHLOROTHIAZIDE 25 MG/1
25 TABLET ORAL DAILY
Qty: 90 TABLET | Refills: 0 | Status: SHIPPED | OUTPATIENT
Start: 2021-02-08 | End: 2021-03-02

## 2021-02-08 NOTE — TELEPHONE ENCOUNTER
Patient called regarding refill of hydrochlorothiazide (HYDRODIURIL) 25 MG tablet    Note:  E-Prescribing Status: Transmission to pharmacy failed (2/1/2021  1:52 PM CST)    Please resend prescription to pharmacy for patient.  She said she only has 3 pills left.    Maria Dolores Atkinson,

## 2021-02-08 NOTE — TELEPHONE ENCOUNTER
Script resent to pharmacy with confirmation.    Misty Villalobos RN  [facility]

## 2021-02-20 DIAGNOSIS — F41.9 ANXIETY: ICD-10-CM

## 2021-02-20 DIAGNOSIS — F43.23 SITUATIONAL MIXED ANXIETY AND DEPRESSIVE DISORDER: ICD-10-CM

## 2021-02-21 DIAGNOSIS — J44.9 CHRONIC OBSTRUCTIVE PULMONARY DISEASE, UNSPECIFIED COPD TYPE (H): ICD-10-CM

## 2021-02-23 RX ORDER — CITALOPRAM HYDROBROMIDE 20 MG/1
TABLET ORAL
Qty: 30 TABLET | Refills: 0 | Status: SHIPPED | OUTPATIENT
Start: 2021-02-23 | End: 2021-03-02

## 2021-02-23 RX ORDER — MOMETASONE FUROATE AND FORMOTEROL FUMARATE DIHYDRATE 200; 5 UG/1; UG/1
AEROSOL RESPIRATORY (INHALATION)
Qty: 13 G | Refills: 0 | Status: SHIPPED | OUTPATIENT
Start: 2021-02-23 | End: 2021-03-02

## 2021-02-27 ASSESSMENT — ENCOUNTER SYMPTOMS
PARESTHESIAS: 0
MYALGIAS: 0
PALPITATIONS: 0
HEARTBURN: 0
WEAKNESS: 0
EYE PAIN: 0
HEMATURIA: 0
CONSTIPATION: 0
DYSURIA: 0
FEVER: 0
DIARRHEA: 0
HEADACHES: 0
COUGH: 0
SORE THROAT: 0
ARTHRALGIAS: 1
CHILLS: 0
FREQUENCY: 0
BREAST MASS: 0
DIZZINESS: 0
HEMATOCHEZIA: 0
NERVOUS/ANXIOUS: 0
SHORTNESS OF BREATH: 0
ABDOMINAL PAIN: 0
JOINT SWELLING: 0
NAUSEA: 0

## 2021-03-01 ASSESSMENT — ENCOUNTER SYMPTOMS
CHILLS: 0
CONSTIPATION: 0
BREAST MASS: 0
HEMATURIA: 0
NERVOUS/ANXIOUS: 0
ARTHRALGIAS: 1
WEAKNESS: 0
MYALGIAS: 0
NAUSEA: 0
PARESTHESIAS: 0
SHORTNESS OF BREATH: 0
DYSURIA: 0
EYE PAIN: 0
JOINT SWELLING: 0
FREQUENCY: 0
HEMATOCHEZIA: 0
PALPITATIONS: 0
HEARTBURN: 0
DIZZINESS: 0
FEVER: 0
DIARRHEA: 0
HEADACHES: 0
ABDOMINAL PAIN: 0
SORE THROAT: 0
COUGH: 0

## 2021-03-01 NOTE — PROGRESS NOTES
SUBJECTIVE:   CC: Juanita Lechuga is an 55 year old woman who presents for preventive health visit.       Patient has been advised of split billing requirements and indicates understanding: Yes  Healthy Habits:     Getting at least 3 servings of Calcium per day:  Yes    Bi-annual eye exam:  NO    Dental care twice a year:  Yes    Sleep apnea or symptoms of sleep apnea:  None    Diet:  Low salt, Low fat/cholesterol and Other    Frequency of exercise:  2-3 days/week    Duration of exercise:  15-30 minutes    Taking medications regularly:  0    PHQ-2 Total Score: 0    Additional concerns today:  No  History of Present Illness       Mental Health Follow-up:  Patient presents to follow-up on Depression & Anxiety.Patient's depression since last visit has been:  Good  The patient is not having other symptoms associated with depression.  Patient's anxiety since last visit has been:  Good  The patient is not having other symptoms associated with anxiety.  Any significant life events: No  Patient is not feeling anxious or having panic attacks.  Patient has no concerns about alcohol or drug use.     Social History  Tobacco Use    Smoking status: Former Smoker      Packs/day: 0.50      Years: 27.00      Pack years: 13.5      Types: Cigarettes      Quit date: 10/24/2011      Years since quittin.3    Smokeless tobacco: Former User  Alcohol use: Yes    Comment: 2 drinks a month  Drug use: No      Today's PHQ-9         PHQ-9 Total Score:         PHQ-9 Q9 Thoughts of better off dead/self-harm past 2 weeks :       Thoughts of suicide or self harm:      Self-harm Plan:        Self-harm Action:          Safety concerns for self or others:           Hypertension: She presents for follow up of hypertension.  She regularly outside of the clinic. Outpatient blood pressures have not been over 140/90.: ONCE A WEEK. She follows a low salt diet.     She eats 4 or more servings of fruits and vegetables daily.She consumes 0 sweetened  beverage(s) daily.She exercises with enough effort to increase her heart rate 20 to 29 minutes per day.  She exercises with enough effort to increase her heart rate 3 or less days per week.   She is taking medications regularly.        Anxiety Follow-Up    How are you doing with your anxiety since your last visit? Improved     Are you having other symptoms that might be associated with anxiety? No    Have you had a significant life event? No     Are you feeling depressed? No    Do you have any concerns with your use of alcohol or other drugs? No    Social History     Tobacco Use     Smoking status: Former Smoker     Packs/day: 0.50     Years: 27.00     Pack years: 13.50     Types: Cigarettes     Quit date: 10/24/2011     Years since quittin.3     Smokeless tobacco: Former User   Substance Use Topics     Alcohol use: Yes     Comment: 2 drinks a month     Drug use: No     CANDELARIO-7 SCORE 2020 2020 3/2/2021   Total Score - - -   Total Score 0 4 0     PHQ 2020 2020 3/2/2021   PHQ-9 Total Score 2 1 0   Q9: Thoughts of better off dead/self-harm past 2 weeks Not at all Not at all Not at all     Last PHQ-9 3/2/2021   1.  Little interest or pleasure in doing things 0   2.  Feeling down, depressed, or hopeless 0   3.  Trouble falling or staying asleep, or sleeping too much 0   4.  Feeling tired or having little energy 0   5.  Poor appetite or overeating 0   6.  Feeling bad about yourself 0   7.  Trouble concentrating 0   8.  Moving slowly or restless 0   Q9: Thoughts of better off dead/self-harm past 2 weeks 0   PHQ-9 Total Score 0   Difficulty at work, home, or with people Not difficult at all     CANDELARIO-7  3/2/2021   1. Feeling nervous, anxious, or on edge 0   2. Not being able to stop or control worrying 0   3. Worrying too much about different things 0   4. Trouble relaxing 0   5. Being so restless that it is hard to sit still 0   6. Becoming easily annoyed or irritable 0   7. Feeling afraid, as if  something awful might happen 0   CANDELARIO-7 Total Score 0   If you checked any problems, how difficult have they made it for you to do your work, take care of things at home, or get along with other people? Not difficult at all         COPD Follow-Up    Overall, how are your COPD symptoms since your last clinic visit?  Better    How much fatigue or shortness of breath do you have when you are walking?  None    How much shortness of breath do you have when you are resting?  None    How often do you cough? Never    Have you noticed any change in your sputum/phlegm?  No    Have you experienced a recent fever? No    Please describe how far you can walk without stopping to rest:  Doing very well    How many flights of stairs are you able to walk up without stopping?  None    Have you had any Emergency Room Visits, Urgent Care Visits, or Hospital Admissions because of your COPD since your last office visit?  No    History   Smoking Status     Former Smoker     Packs/day: 0.50     Years: 27.00     Types: Cigarettes     Quit date: 10/24/2011   Smokeless Tobacco     Former User     Lab Results   Component Value Date    FEV1 96 03/08/2013    FAN2AQE 86 03/08/2013         Today's PHQ-2 Score:   PHQ-2 ( 1999 Pf[employer]) 2/27/2021   Q1: Little interest or pleasure in doing things 0   Q2: Feeling down, depressed or hopeless 0   PHQ-2 Score 0   Q1: Little interest or pleasure in doing things Not at all   Q2: Feeling down, depressed or hopeless Not at all   PHQ-2 Score 0       Abuse: Current or Past (Physical, Sexual or Emotional) - No  Do you feel safe in your environment? Yes    Have you ever done Advance Care Planning? (For example, a Health Directive, POLST, or a discussion with a medical provider or your loved ones about your wishes): No, advance care planning information given to patient to review.  Patient declined advance care planning discussion at this time.    Social History     Tobacco Use     Smoking status: Former Smoker      Packs/day: 0.50     Years: 27.00     Pack years: 13.50     Types: Cigarettes     Quit date: 10/24/2011     Years since quittin.3     Smokeless tobacco: Former User   Substance Use Topics     Alcohol use: Yes     Comment: 2 drinks a month     If you drink alcohol do you typically have >3 drinks per day or >7 drinks per week? No    No flowsheet data found.    Any new diagnosis of family breast, ovarian, or bowel cancer? No     Reviewed orders with patient.  Reviewed health maintenance and updated orders accordingly - Yes  Lab work is in process  Labs reviewed in EPIC  BP Readings from Last 3 Encounters:   21 138/76   20 132/60   20 132/70    Wt Readings from Last 3 Encounters:   21 90.7 kg (200 lb)   20 99.8 kg (220 lb)   20 92.4 kg (203 lb 9.6 oz)                  Patient Active Problem List   Diagnosis     Old disruption of anterior cruciate ligament     CARDIOVASCULAR SCREENING; LDL GOAL LESS THAN 130     Advanced directives, counseling/discussion     Situational mixed anxiety and depressive disorder     Adjustment disorder with mixed anxiety and depressed mood     Anxiety     Hypertrophy of breast     Benign essential hypertension     Morbid obesity (H)     Chronic pansinusitis     Nasal polyposis     Other emphysema (H)     Past Surgical History:   Procedure Laterality Date     BUNIONECTOMY RT/LT  2008    (L) first toe     BUNIONECTOMY RT/LT  12    right first toe     OPTICAL TRACKING SYSTEM ENDOSCOPIC SINUS SURGERY Bilateral 10/9/2018    Procedure: OPTICAL TRACKING SYSTEM ENDOSCOPIC SINUS SURGERY;  Image guided endoscopic sinus surgery endoscopic septoplasty;  Surgeon: Micky Almaraz MD;  Location: [facility] OR     SEPTOPLASTY N/A 10/9/2018    Procedure: SEPTOPLASTY;;  Surgeon: Micky Almaraz MD;  Location: [facility] OR       Social History     Tobacco Use     Smoking status: Former Smoker     Packs/day: 0.50     Years: 27.00     Pack years: 13.50     Types:  Cigarettes     Quit date: 10/24/2011     Years since quittin.3     Smokeless tobacco: Former User   Substance Use Topics     Alcohol use: Yes     Comment: 2 drinks a month     Family History   Problem Relation Age of Onset     C.A.D. Father      Coronary Artery Disease Father      Hypertension Father      Hyperlipidemia Father      Cancer Maternal Grandmother         ?         Current Outpatient Medications   Medication Sig Dispense Refill     albuterol (PROAIR HFA/PROVENTIL HFA/VENTOLIN HFA) 108 (90 Base) MCG/ACT inhaler INHALE 2 PUFFS BY MOUTH EVERY 6 HOURS 18 g 3     amLODIPine (NORVASC) 2.5 MG tablet Take 1 tablet (2.5 mg) by mouth daily 90 tablet 3     azelastine (ASTEPRO) 0.15 % nasal spray Spray 2 sprays into both nostrils 2 times daily 30 mL 11     carvedilol ER (COREG CR) 40 MG 24 hr capsule Take 1 capsule by mouth once daily 90 capsule 3     citalopram (CELEXA) 20 MG tablet TAKE 1 TABLET BY MOUTH ONCE DAILY 90 tablet 1     fluticasone (FLONASE) 50 MCG/ACT nasal spray Spray 2 sprays into both nostrils daily 3 mL 11     hydrALAZINE (APRESOLINE) 25 MG tablet TAKE 1 TABLET BY MOUTH 4 TIMES DAILY FOR HIGH BLOOD PRESSURE 360 tablet 3     hydrochlorothiazide (HYDRODIURIL) 25 MG tablet Take 1 tablet (25 mg) by mouth daily 90 tablet 3     losartan (COZAAR) 100 MG tablet Take 1 tablet (100 mg) by mouth daily 90 tablet 3     mometasone-formoterol (DULERA) 200-5 MCG/ACT inhaler Inhale 2 puffs by mouth twice daily 13 g 3     MULTI-VITAMIN OR TABS 1 TABLET DAILY       potassium chloride ER (KLOR-CON M) 10 MEQ CR tablet TAKE 1  BY MOUTH ONCE DAILY 90 tablet 3     Probiotic Product (PROBIOTIC-10) CHEW        vitamin D3 (CHOLECALCIFEROL) 50 mcg (2000 units) tablet Take 1 tablet (50 mcg) by mouth daily       Allergies   Allergen Reactions     Lisinopril Cough     Seasonal Allergies Unknown     Recent Labs   Lab Test 20  1529 19  1110 18  1143 18  0859 18  0859 10/05/17  1719 17  1055    LDL  --  124*  --   --  124*  --  115*   HDL  --  78  --   --  62  --  80   TRIG  --  64  --   --  61  --  71   ALT  --   --   --   --  30  --   --    CR 0.47*  --   --   --  0.45*  --  0.48*   GFRESTIMATED >90  --   --   --  >90  --  >90  Non  GFR Calc     GFRESTBLACK >90  --   --   --  >90  --  >90  African American GFR Calc     POTASSIUM 3.9  --  3.9   < > 4.0  --  3.9   TSH  --   --   --   --   --  2.90 1.84    < > = values in this interval not displayed.        Breast CA Risk Screening:  No flowsheet data found.  No flowsheet data found.    .  Pertinent mammograms are reviewed under the imaging tab.    History of abnormal Pap smear: NO - age 30-65 PAP every 5 years with negative HPV co-testing recommended  PAP / HPV Latest Ref Rng & Units 2/24/2017 12/1/2014 10/3/2011   PAP - NIL NIL NIL   HPV 16 DNA NEG Negative - -   HPV 18 DNA NEG Negative - -   OTHER HR HPV NEG Negative - -     Reviewed and updated as needed this visit by clinical staff  Tobacco  Allergies  Meds   Med Hx  Surg Hx  Fam Hx  Soc Hx        Reviewed and updated as needed this visit by Provider                Past Medical History:   Diagnosis Date     ACL tear 12/09    left, improved with physical therapy     COPD (chronic obstructive pulmonary disease) (H) 3/29/2012    resolved after she quit smoking     HTN (hypertension)       Past Surgical History:   Procedure Laterality Date     BUNIONECTOMY RT/LT  8/7/2008    (L) first toe     BUNIONECTOMY RT/LT  9/20/12    right first toe     OPTICAL TRACKING SYSTEM ENDOSCOPIC SINUS SURGERY Bilateral 10/9/2018    Procedure: OPTICAL TRACKING SYSTEM ENDOSCOPIC SINUS SURGERY;  Image guided endoscopic sinus surgery endoscopic septoplasty;  Surgeon: Micky Almaraz MD;  Location: [facility] OR     SEPTOPLASTY N/A 10/9/2018    Procedure: SEPTOPLASTY;;  Surgeon: Micky Almaraz MD;  Location:  OR       Review of Systems   Constitutional: Negative for chills and fever.   HENT:  "Negative for congestion, ear pain, hearing loss and sore throat.    Eyes: Negative for pain and visual disturbance.   Respiratory: Negative for cough and shortness of breath.    Cardiovascular: Negative for chest pain, palpitations and peripheral edema.   Gastrointestinal: Negative for abdominal pain, constipation, diarrhea, heartburn, hematochezia and nausea.   Breasts:  Negative for tenderness, breast mass and discharge.   Genitourinary: Negative for dysuria, frequency, genital sores, hematuria, pelvic pain, urgency, vaginal bleeding and vaginal discharge.   Musculoskeletal: Positive for arthralgias. Negative for joint swelling and myalgias.   Skin: Negative for rash.   Neurological: Negative for dizziness, weakness, headaches and paresthesias.   Psychiatric/Behavioral: Negative for mood changes. The patient is not nervous/anxious.    Rest of the ROS is Negative except see above and Problem list [stable]    OBJECTIVE:   /76   Pulse 79   Temp 98.5  F (36.9  C) (Oral)   Resp 18   Ht 1.549 m (5' 1\")   Wt 90.7 kg (200 lb)   LMP 12/28/2015 (Approximate)   SpO2 97%   BMI 37.79 kg/m    Physical Exam  GENERAL APPEARANCE: healthy, alert and no distress  EYES: Eyes grossly normal to inspection, PERRL and conjunctivae and sclerae normal  HENT: ear canals and TM's normal, nose and mouth without ulcers or lesions, oropharynx clear and oral mucous membranes moist  NECK: no adenopathy, no asymmetry, masses, or scars and thyroid normal to palpation  RESP: lungs clear to auscultation - no rales, rhonchi or wheezes  BREAST: normal without masses, tenderness or nipple discharge and no palpable axillary masses or adenopathy  CV: regular rate and rhythm, normal S1 S2, no S3 or S4, no murmur, click or rub, no peripheral edema and peripheral pulses strong  ABDOMEN: soft, nontender, no hepatosplenomegaly, no masses and bowel sounds normal  MS: no musculoskeletal defects are noted and gait is age appropriate without " ataxia  SKIN: no suspicious lesions or rashes  NEURO: Normal strength and tone, sensory exam grossly normal, mentation intact and speech normal  PSYCH: mentation appears normal and affect normal/bright    Diagnostic Test Results:  Labs reviewed in Epic  Pending     ASSESSMENT/PLAN:   1. Encounter for routine adult physical exam with abnormal findings      2. Chronic obstructive pulmonary disease, unspecified COPD type (H)  controlled  - mometasone-formoterol (DULERA) 200-5 MCG/ACT inhaler; Inhale 2 puffs by mouth twice daily  Dispense: 13 g; Refill: 3    3. Anxiety  Stable   - citalopram (CELEXA) 20 MG tablet; TAKE 1 TABLET BY MOUTH ONCE DAILY  Dispense: 90 tablet; Refill: 1    4. Benign essential hypertension  controlled  - carvedilol ER (COREG CR) 40 MG 24 hr capsule; Take 1 capsule by mouth once daily  Dispense: 90 capsule; Refill: 3  - hydrALAZINE (APRESOLINE) 25 MG tablet; TAKE 1 TABLET BY MOUTH 4 TIMES DAILY FOR HIGH BLOOD PRESSURE  Dispense: 360 tablet; Refill: 3  - amLODIPine (NORVASC) 2.5 MG tablet; Take 1 tablet (2.5 mg) by mouth daily  Dispense: 90 tablet; Refill: 3  - Lipid panel reflex to direct LDL Fasting  - Comprehensive metabolic panel  - hydrochlorothiazide (HYDRODIURIL) 25 MG tablet; Take 1 tablet (25 mg) by mouth daily  Dispense: 90 tablet; Refill: 3    5. Situational mixed anxiety and depressive disorder  Doing well  - citalopram (CELEXA) 20 MG tablet; TAKE 1 TABLET BY MOUTH ONCE DAILY  Dispense: 90 tablet; Refill: 1    6. Screening for HIV (human immunodeficiency virus)  Advised   - HIV Antigen Antibody Combo    7. Hypokalemia    - potassium chloride ER (KLOR-CON M) 10 MEQ CR tablet; TAKE 1  BY MOUTH ONCE DAILY  Dispense: 90 tablet; Refill: 3    Patient has been advised of split billing requirements and indicates understanding: Yes  COUNSELING:  Reviewed preventive health counseling, as reflected in patient instructions       Regular exercise       Healthy diet/nutrition       Vision  "screening       Hearing screening       Osteoporosis prevention/bone health       Colon cancer screening       HIV screeninx in teen years, 1x in adult years, and at intervals if high risk       Advance Care Planning    Estimated body mass index is 37.79 kg/m  as calculated from the following:    Height as of this encounter: 1.549 m (5' 1\").    Weight as of this encounter: 90.7 kg (200 lb).    Weight management plan: Discussed healthy diet and exercise guidelines    She reports that she quit smoking about 9 years ago. Her smoking use included cigarettes. She has a 13.50 pack-year smoking history. She has quit using smokeless tobacco.      Counseling Resources:  ATP IV Guidelines  Pooled Cohorts Equation Calculator  Breast Cancer Risk Calculator  BRCA-Related Cancer Risk Assessment: FHS-7 Tool  FRAX Risk Assessment  ICSI Preventive Guidelines  Dietary Guidelines for Americans, 2010  USDA's MyPlate  ASA Prophylaxis  Lung CA Screening    Mable Cardoso MD  [facility]  "

## 2021-03-02 ENCOUNTER — OFFICE VISIT (OUTPATIENT)
Dept: FAMILY MEDICINE | Facility: CLINIC | Age: 56
End: 2021-03-02
Payer: COMMERCIAL

## 2021-03-02 VITALS
WEIGHT: 200 LBS | OXYGEN SATURATION: 97 % | SYSTOLIC BLOOD PRESSURE: 138 MMHG | DIASTOLIC BLOOD PRESSURE: 76 MMHG | BODY MASS INDEX: 37.76 KG/M2 | TEMPERATURE: 98.5 F | HEART RATE: 79 BPM | HEIGHT: 61 IN | RESPIRATION RATE: 18 BRPM

## 2021-03-02 DIAGNOSIS — Z00.01 ENCOUNTER FOR ROUTINE ADULT PHYSICAL EXAM WITH ABNORMAL FINDINGS: ICD-10-CM

## 2021-03-02 DIAGNOSIS — F43.23 SITUATIONAL MIXED ANXIETY AND DEPRESSIVE DISORDER: ICD-10-CM

## 2021-03-02 DIAGNOSIS — J44.9 CHRONIC OBSTRUCTIVE PULMONARY DISEASE, UNSPECIFIED COPD TYPE (H): ICD-10-CM

## 2021-03-02 DIAGNOSIS — F41.9 ANXIETY: ICD-10-CM

## 2021-03-02 DIAGNOSIS — Z11.4 SCREENING FOR HIV (HUMAN IMMUNODEFICIENCY VIRUS): Primary | ICD-10-CM

## 2021-03-02 DIAGNOSIS — I10 BENIGN ESSENTIAL HYPERTENSION: ICD-10-CM

## 2021-03-02 DIAGNOSIS — E87.6 HYPOKALEMIA: ICD-10-CM

## 2021-03-02 LAB
ALBUMIN SERPL-MCNC: 3.5 G/DL (ref 3.4–5)
ALP SERPL-CCNC: 61 U/L (ref 40–150)
ALT SERPL W P-5'-P-CCNC: 36 U/L (ref 0–50)
ANION GAP SERPL CALCULATED.3IONS-SCNC: 6 MMOL/L (ref 3–14)
AST SERPL W P-5'-P-CCNC: 27 U/L (ref 0–45)
BILIRUB SERPL-MCNC: 0.4 MG/DL (ref 0.2–1.3)
BUN SERPL-MCNC: 13 MG/DL (ref 7–30)
CALCIUM SERPL-MCNC: 9.3 MG/DL (ref 8.5–10.1)
CHLORIDE SERPL-SCNC: 106 MMOL/L (ref 94–109)
CHOLEST SERPL-MCNC: 191 MG/DL
CO2 SERPL-SCNC: 28 MMOL/L (ref 20–32)
CREAT SERPL-MCNC: 0.55 MG/DL (ref 0.52–1.04)
GFR SERPL CREATININE-BSD FRML MDRD: >90 ML/MIN/{1.73_M2}
GLUCOSE SERPL-MCNC: 87 MG/DL (ref 70–99)
HDLC SERPL-MCNC: 73 MG/DL
HIV 1+2 AB+HIV1 P24 AG SERPL QL IA: NONREACTIVE
LDLC SERPL CALC-MCNC: 109 MG/DL
NONHDLC SERPL-MCNC: 118 MG/DL
POTASSIUM SERPL-SCNC: 3.8 MMOL/L (ref 3.4–5.3)
PROT SERPL-MCNC: 7.2 G/DL (ref 6.8–8.8)
SODIUM SERPL-SCNC: 140 MMOL/L (ref 133–144)
TRIGL SERPL-MCNC: 43 MG/DL

## 2021-03-02 PROCEDURE — 99214 OFFICE O/P EST MOD 30 MIN: CPT | Mod: 25 | Performed by: FAMILY MEDICINE

## 2021-03-02 PROCEDURE — 80061 LIPID PANEL: CPT | Performed by: FAMILY MEDICINE

## 2021-03-02 PROCEDURE — 80053 COMPREHEN METABOLIC PANEL: CPT | Performed by: FAMILY MEDICINE

## 2021-03-02 PROCEDURE — 36415 COLL VENOUS BLD VENIPUNCTURE: CPT | Performed by: FAMILY MEDICINE

## 2021-03-02 PROCEDURE — 99396 PREV VISIT EST AGE 40-64: CPT | Performed by: FAMILY MEDICINE

## 2021-03-02 PROCEDURE — 87389 HIV-1 AG W/HIV-1&-2 AB AG IA: CPT | Performed by: FAMILY MEDICINE

## 2021-03-02 RX ORDER — HYDROCHLOROTHIAZIDE 25 MG/1
25 TABLET ORAL DAILY
Qty: 90 TABLET | Refills: 3 | Status: SHIPPED | OUTPATIENT
Start: 2021-03-02 | End: 2022-04-27

## 2021-03-02 RX ORDER — ALPRAZOLAM 0.25 MG
TABLET ORAL
Qty: 10 TABLET | Refills: 0 | Status: CANCELLED | OUTPATIENT
Start: 2021-03-02

## 2021-03-02 RX ORDER — AMLODIPINE BESYLATE 2.5 MG/1
2.5 TABLET ORAL DAILY
Qty: 90 TABLET | Refills: 3 | Status: SHIPPED | OUTPATIENT
Start: 2021-03-02 | End: 2022-04-27

## 2021-03-02 RX ORDER — MOMETASONE FUROATE AND FORMOTEROL FUMARATE DIHYDRATE 200; 5 UG/1; UG/1
AEROSOL RESPIRATORY (INHALATION)
Qty: 13 G | Refills: 3 | Status: SHIPPED | OUTPATIENT
Start: 2021-03-02 | End: 2021-12-29

## 2021-03-02 RX ORDER — POTASSIUM CHLORIDE 750 MG/1
TABLET, EXTENDED RELEASE ORAL
Qty: 90 TABLET | Refills: 3 | Status: SHIPPED | OUTPATIENT
Start: 2021-03-02 | End: 2022-04-06

## 2021-03-02 RX ORDER — CITALOPRAM HYDROBROMIDE 20 MG/1
TABLET ORAL
Qty: 30 TABLET | Refills: 0 | Status: SHIPPED | OUTPATIENT
Start: 2021-03-02 | End: 2021-03-02

## 2021-03-02 RX ORDER — ALBUTEROL SULFATE 90 UG/1
AEROSOL, METERED RESPIRATORY (INHALATION)
Qty: 18 G | Refills: 3 | Status: SHIPPED | OUTPATIENT
Start: 2021-03-02 | End: 2022-07-06

## 2021-03-02 RX ORDER — CARVEDILOL PHOSPHATE 40 MG/1
CAPSULE, EXTENDED RELEASE ORAL
Qty: 90 CAPSULE | Refills: 3 | Status: SHIPPED | OUTPATIENT
Start: 2021-03-02 | End: 2022-03-07

## 2021-03-02 RX ORDER — HYDROCHLOROTHIAZIDE 25 MG/1
25 TABLET ORAL DAILY
Qty: 90 TABLET | Refills: 0 | Status: SHIPPED | OUTPATIENT
Start: 2021-03-02 | End: 2021-03-02

## 2021-03-02 RX ORDER — CHOLECALCIFEROL (VITAMIN D3) 50 MCG
1 TABLET ORAL DAILY
COMMUNITY
Start: 2021-03-02

## 2021-03-02 RX ORDER — CITALOPRAM HYDROBROMIDE 20 MG/1
TABLET ORAL
Qty: 90 TABLET | Refills: 1 | Status: SHIPPED | OUTPATIENT
Start: 2021-03-02 | End: 2021-09-10

## 2021-03-02 RX ORDER — HYDRALAZINE HYDROCHLORIDE 25 MG/1
TABLET, FILM COATED ORAL
Qty: 360 TABLET | Refills: 3 | Status: SHIPPED | OUTPATIENT
Start: 2021-03-02 | End: 2022-06-14

## 2021-03-02 ASSESSMENT — MIFFLIN-ST. JEOR: SCORE: 1429.57

## 2021-03-02 ASSESSMENT — ANXIETY QUESTIONNAIRES
1. FEELING NERVOUS, ANXIOUS, OR ON EDGE: NOT AT ALL
3. WORRYING TOO MUCH ABOUT DIFFERENT THINGS: NOT AT ALL
GAD7 TOTAL SCORE: 0
5. BEING SO RESTLESS THAT IT IS HARD TO SIT STILL: NOT AT ALL
2. NOT BEING ABLE TO STOP OR CONTROL WORRYING: NOT AT ALL
7. FEELING AFRAID AS IF SOMETHING AWFUL MIGHT HAPPEN: NOT AT ALL
IF YOU CHECKED OFF ANY PROBLEMS ON THIS QUESTIONNAIRE, HOW DIFFICULT HAVE THESE PROBLEMS MADE IT FOR YOU TO DO YOUR WORK, TAKE CARE OF THINGS AT HOME, OR GET ALONG WITH OTHER PEOPLE: NOT DIFFICULT AT ALL
6. BECOMING EASILY ANNOYED OR IRRITABLE: NOT AT ALL

## 2021-03-02 ASSESSMENT — PATIENT HEALTH QUESTIONNAIRE - PHQ9
SUM OF ALL RESPONSES TO PHQ QUESTIONS 1-9: 0
5. POOR APPETITE OR OVEREATING: NOT AT ALL

## 2021-03-02 ASSESSMENT — PAIN SCALES - GENERAL: PAINLEVEL: NO PAIN (0)

## 2021-03-02 NOTE — PATIENT INSTRUCTIONS
We are working hard to begin vaccinating more people against COVID-19. Currently, we are only vaccinating individuals age 70 and older and Phase 1a workers - healthcare workers who are unable to do their job remotely. Vaccine availability is very limited.    If you are 70 or older, or a healthcare worker who is unable to do your job remotely, please log in to [facility] using this link to see if we have an open appointment and schedule an appointment.  If there are no appointments left, you will be unable to schedule and need to check back later.  If you are a healthcare worker, you will be asked to provide proof of employment at your appointment. If you cannot, you will be turned away.    Vaccine appointments are being added as they become available. Please check your [facility] account frequently for availability. If you have technical difficulty using [facility], call [phone] for assistance.    You can learn more about the [facility]'s phased approach to administering the vaccine, with details on each phase, [email or web address].      As vaccine supply increases and we are able to open appointments to more groups, we will share that information on our website [email or web address]. Check this website to stay up to date on COVID-19 vaccination information.

## 2021-03-03 ASSESSMENT — ANXIETY QUESTIONNAIRES: GAD7 TOTAL SCORE: 0

## 2021-03-22 ENCOUNTER — MYC MEDICAL ADVICE (OUTPATIENT)
Dept: FAMILY MEDICINE | Facility: CLINIC | Age: 56
End: 2021-03-22

## 2021-04-04 ENCOUNTER — MYC MEDICAL ADVICE (OUTPATIENT)
Dept: FAMILY MEDICINE | Facility: CLINIC | Age: 56
End: 2021-04-04

## 2021-04-05 NOTE — TELEPHONE ENCOUNTER
Pt states that she wants to hold off on scheduling right now. She has a little bit left of the cream from derm. She will schedule appointment in few months in clinic.     Natali Lozano RN  [facility]

## 2021-05-01 DIAGNOSIS — I10 BENIGN ESSENTIAL HYPERTENSION: ICD-10-CM

## 2021-05-03 RX ORDER — LOSARTAN POTASSIUM 100 MG/1
TABLET ORAL
Qty: 90 TABLET | Refills: 0 | Status: SHIPPED | OUTPATIENT
Start: 2021-05-03 | End: 2021-07-27

## 2021-05-18 ENCOUNTER — MEDICAL CORRESPONDENCE (OUTPATIENT)
Dept: HEALTH INFORMATION MANAGEMENT | Facility: CLINIC | Age: 56
End: 2021-05-18

## 2021-06-07 NOTE — PROGRESS NOTES
[facility]  [address]  [place] [address]  Phone: [phone]  Primary Provider: Mable Vila  Pre-op Performing Provider: MABLE VILA      PREOPERATIVE EVALUATION:  Today's date: 6/10/2021    Juanita Lechuga is a 55 year old female who presents for a preoperative evaluation.    Surgical Information:  Surgery/Procedure: reconstruction of right toe  Surgery Location: [facility]  Surgeon: Yang Kelly  Surgery Date: 6/16/2021  Time of Surgery: 7 am  Where patient plans to recover: At home with family  Fax number for surgical facility: [phone]    Type of Anesthesia Anticipated: to be determined    Assessment & Plan     The proposed surgical procedure is considered LOW risk.    Preop general physical exam    - EKG 12-lead complete w/read - Clinics  - Hemoglobin  - Asymptomatic COVID-19 Virus (Coronavirus) by PCR; Future    Situational mixed anxiety and depressive disorder  Stable     Other emphysema (H)  Stable     Morbid obesity (H    Benign essential hypertension  Stable   - Potassium         Risks and Recommendations:  The patient has the following additional risks and recommendations for perioperative complications:   - Morbid obesity (BMI >40)    Medication Instructions:   - Diuretics: HOLD on the day of surgery.    RECOMMENDATION:  APPROVAL GIVEN to proceed with proposed procedure, without further diagnostic evaluation.    Review of external notes as documented above             Subjective     HPI related to upcoming procedure: Pt is going t have Right Toe surgery for hammer Toe    Preop Questions 6/7/2021   1. Have you ever had a heart attack or stroke? No   2. Have you ever had surgery on your heart or blood vessels, such as a stent placement, a coronary artery bypass, or surgery on an artery in your head, neck, heart, or legs? No   3. Do you have chest pain with activity? No   4. Do you have a history of  heart failure? No   5. Do you currently have a cold,  bronchitis or symptoms of other infection? No   6. Do you have a cough, shortness of breath, or wheezing? No   7. Do you or anyone in your family have previous history of blood clots? No   8. Do you or does anyone in your family have a serious bleeding problem such as prolonged bleeding following surgeries or cuts? No   9. Have you ever had problems with anemia or been told to take iron pills? No   10. Have you had any abnormal blood loss such as black, tarry or bloody stools, or abnormal vaginal bleeding? No   11. Have you ever had a blood transfusion? No   12. Are you willing to have a blood transfusion if it is medically needed before, during, or after your surgery? Yes   13. Have you or any of your relatives ever had problems with anesthesia? No   14. Do you have sleep apnea, excessive snoring or daytime drowsiness? No   15. Do you have any artifical heart valves or other implanted medical devices like a pacemaker, defibrillator, or continuous glucose monitor? No   16. Do you have artificial joints? No   17. Are you allergic to latex? No   18. Is there any chance that you may be pregnant? No       Health Care Directive:  Patient does not have a Health Care Directive or Living Will: Discussed advance care planning with patient; information given to patient to review.    Preoperative Review of :   reviewed - no record of controlled substances prescribed.      Status of Chronic Conditions:  COPD - Patient has a longstanding history of moderate-severe COPD . Patient has been doing well overall noting NO SYMPTOMS and continues on medication regimen consisting of inhalers without adverse reactions or side effects.    HYPERTENSION - Patient has longstanding history of HTN , currently denies any symptoms referable to elevated blood pressure. Specifically denies chest pain, palpitations, dyspnea, orthopnea, PND or peripheral edema. Blood pressure readings have been in normal range. Current medication regimen is as  listed below. Patient denies any side effects of medication.       Review of Systems  CONSTITUTIONAL: NEGATIVE for fever, chills, change in weight  INTEGUMENTARY/SKIN: NEGATIVE for worrisome rashes, moles or lesions  EYES: NEGATIVE for vision changes or irritation  ENT/MOUTH: NEGATIVE for ear, mouth and throat problems  RESP: NEGATIVE for significant cough or SOB  BREAST: NEGATIVE for masses, tenderness or discharge  CV: NEGATIVE for chest pain, palpitations or peripheral edema  GI: NEGATIVE for nausea, abdominal pain, heartburn, or change in bowel habits  : NEGATIVE for frequency, dysuria, or hematuria  MUSCULOSKELETAL: NEGATIVE for significant arthralgias or myalgia  NEURO: NEGATIVE for weakness, dizziness or paresthesias  ENDOCRINE: NEGATIVE for temperature intolerance, skin/hair changes  HEME: NEGATIVE for bleeding problems  PSYCHIATRIC: NEGATIVE for changes in mood or affect    Patient Active Problem List    Diagnosis Date Noted     Other emphysema (H) 06/11/2020     Priority: Medium     Nasal polyposis 10/11/2018     Priority: Medium     Chronic pansinusitis 08/31/2018     Priority: Medium     Morbid obesity (H) 08/30/2018     Priority: Medium     Benign essential hypertension 08/08/2018     Priority: Medium     Hypertrophy of breast 03/23/2018     Priority: Medium     Anxiety 08/09/2012     Priority: Medium     Adjustment disorder with mixed anxiety and depressed mood 05/24/2012     Priority: Medium     Situational mixed anxiety and depressive disorder 04/26/2012     Priority: Medium     Advanced directives, counseling/discussion 04/02/2012     Priority: Medium     Discussed Advance Directive planning with patient; information given to patient to review.  Riya Joe LPN         CARDIOVASCULAR SCREENING; LDL GOAL LESS THAN 130 10/31/2010     Priority: Medium     Old disruption of anterior cruciate ligament 04/08/2010     Priority: Medium      Past Medical History:   Diagnosis Date     ACL tear 12/09     left, improved with physical therapy     COPD (chronic obstructive pulmonary disease) (H) 3/29/2012    resolved after she quit smoking     HTN (hypertension)      Past Surgical History:   Procedure Laterality Date     BUNIONECTOMY RT/LT  8/7/2008    (L) first toe     BUNIONECTOMY RT/LT  9/20/12    right first toe     OPTICAL TRACKING SYSTEM ENDOSCOPIC SINUS SURGERY Bilateral 10/9/2018    Procedure: OPTICAL TRACKING SYSTEM ENDOSCOPIC SINUS SURGERY;  Image guided endoscopic sinus surgery endoscopic septoplasty;  Surgeon: Micky Almaraz MD;  Location: [facility] OR     SEPTOPLASTY N/A 10/9/2018    Procedure: SEPTOPLASTY;;  Surgeon: Micky Almaraz MD;  Location: [facility] OR     Current Outpatient Medications   Medication Sig Dispense Refill     albuterol (PROAIR HFA/PROVENTIL HFA/VENTOLIN HFA) 108 (90 Base) MCG/ACT inhaler INHALE 2 PUFFS BY MOUTH EVERY 6 HOURS 18 g 3     amLODIPine (NORVASC) 2.5 MG tablet Take 1 tablet (2.5 mg) by mouth daily 90 tablet 3     azelastine (ASTEPRO) 0.15 % nasal spray Spray 2 sprays into both nostrils 2 times daily 30 mL 11     carvedilol ER (COREG CR) 40 MG 24 hr capsule Take 1 capsule by mouth once daily 90 capsule 3     citalopram (CELEXA) 20 MG tablet TAKE 1 TABLET BY MOUTH ONCE DAILY 90 tablet 1     fluticasone (FLONASE) 50 MCG/ACT nasal spray Spray 2 sprays into both nostrils daily 3 mL 11     hydrALAZINE (APRESOLINE) 25 MG tablet TAKE 1 TABLET BY MOUTH 4 TIMES DAILY FOR HIGH BLOOD PRESSURE 360 tablet 3     hydrochlorothiazide (HYDRODIURIL) 25 MG tablet Take 1 tablet (25 mg) by mouth daily 90 tablet 3     losartan (COZAAR) 100 MG tablet Take 1 tablet by mouth once daily 90 tablet 0     mometasone-formoterol (DULERA) 200-5 MCG/ACT inhaler Inhale 2 puffs by mouth twice daily 13 g 3     MULTI-VITAMIN OR TABS 1 TABLET DAILY       potassium chloride ER (KLOR-CON M) 10 MEQ CR tablet TAKE 1  BY MOUTH ONCE DAILY 90 tablet 3     Probiotic Product (PROBIOTIC-10) CHEW        vitamin D3  (CHOLECALCIFEROL) 50 mcg (2000 units) tablet Take 1 tablet (50 mcg) by mouth daily         Allergies   Allergen Reactions     Lisinopril Cough     Seasonal Allergies Unknown        Social History     Tobacco Use     Smoking status: Former Smoker     Packs/day: 0.50     Years: 27.00     Pack years: 13.50     Types: Cigarettes     Quit date: 10/24/2011     Years since quittin.6     Smokeless tobacco: Former User   Substance Use Topics     Alcohol use: Yes     Comment: 2 drinks a month     Family History   Problem Relation Age of Onset     C.A.D. Father      Coronary Artery Disease Father      Hypertension Father      Hyperlipidemia Father      Cancer Maternal Grandmother         ?     History   Drug Use No         Objective     LMP 2015 (Approximate)     Physical Exam    GENERAL APPEARANCE: healthy, alert and no distress     EYES: EOMI, PERRL     HENT: ear canals and TM's normal and nose and mouth without ulcers or lesions     NECK: no adenopathy, no asymmetry, masses, or scars and thyroid normal to palpation     RESP: lungs clear to auscultation - no rales, rhonchi or wheezes     CV: regular rates and rhythm, normal S1 S2, no S3 or S4 and no murmur, click or rub     ABDOMEN:  soft, nontender, no HSM or masses and bowel sounds normal     MS: extremities normal- no gross deformities noted, no evidence of inflammation in joints, FROM in all extremities.     SKIN: no suspicious lesions or rashes     NEURO: Normal strength and tone, sensory exam grossly normal, mentation intact and speech normal     PSYCH: mentation appears normal. and affect normal/bright     LYMPHATICS: No cervical adenopathy    Recent Labs   Lab Test 21  1000 20  1529    135   POTASSIUM 3.8 3.9   CR 0.55 0.47*        Diagnostics:  Labs pending at this time.  Results will be reviewed when available.   EKG required for unchanged and not completed in the last 90 days.     Revised Cardiac Risk Index (RCRI):  The patient has the  following serious cardiovascular risks for perioperative complications:   - No serious cardiac risks = 0 points     RCRI Interpretation: 0 points: Class I (very low risk - 0.4% complication rate)           Signed Electronically by: Mable Cardoso MD  Copy of this evaluation report is provided to requesting physician.

## 2021-06-10 ENCOUNTER — OFFICE VISIT (OUTPATIENT)
Dept: FAMILY MEDICINE | Facility: CLINIC | Age: 56
End: 2021-06-10
Payer: COMMERCIAL

## 2021-06-10 VITALS
SYSTOLIC BLOOD PRESSURE: 136 MMHG | OXYGEN SATURATION: 98 % | HEART RATE: 81 BPM | BODY MASS INDEX: 38.4 KG/M2 | HEIGHT: 61 IN | DIASTOLIC BLOOD PRESSURE: 80 MMHG | RESPIRATION RATE: 15 BRPM | TEMPERATURE: 98.1 F | WEIGHT: 203.4 LBS

## 2021-06-10 DIAGNOSIS — E66.01 MORBID OBESITY (H): ICD-10-CM

## 2021-06-10 DIAGNOSIS — Z01.818 PREOP GENERAL PHYSICAL EXAM: ICD-10-CM

## 2021-06-10 DIAGNOSIS — I10 BENIGN ESSENTIAL HYPERTENSION: ICD-10-CM

## 2021-06-10 DIAGNOSIS — J43.8 OTHER EMPHYSEMA (H): ICD-10-CM

## 2021-06-10 DIAGNOSIS — F43.23 SITUATIONAL MIXED ANXIETY AND DEPRESSIVE DISORDER: Primary | ICD-10-CM

## 2021-06-10 LAB
HGB BLD-MCNC: 12.2 G/DL (ref 11.7–15.7)
POTASSIUM SERPL-SCNC: 4.2 MMOL/L (ref 3.4–5.3)

## 2021-06-10 PROCEDURE — 84132 ASSAY OF SERUM POTASSIUM: CPT | Performed by: FAMILY MEDICINE

## 2021-06-10 PROCEDURE — 36415 COLL VENOUS BLD VENIPUNCTURE: CPT | Performed by: FAMILY MEDICINE

## 2021-06-10 PROCEDURE — 99214 OFFICE O/P EST MOD 30 MIN: CPT | Performed by: FAMILY MEDICINE

## 2021-06-10 PROCEDURE — 93000 ELECTROCARDIOGRAM COMPLETE: CPT | Performed by: FAMILY MEDICINE

## 2021-06-10 PROCEDURE — 85018 HEMOGLOBIN: CPT | Performed by: FAMILY MEDICINE

## 2021-06-10 ASSESSMENT — MIFFLIN-ST. JEOR: SCORE: 1445

## 2021-06-12 DIAGNOSIS — Z01.818 PREOP GENERAL PHYSICAL EXAM: ICD-10-CM

## 2021-06-12 PROCEDURE — U0005 INFEC AGEN DETEC AMPLI PROBE: HCPCS | Performed by: FAMILY MEDICINE

## 2021-06-12 PROCEDURE — U0003 INFECTIOUS AGENT DETECTION BY NUCLEIC ACID (DNA OR RNA); SEVERE ACUTE RESPIRATORY SYNDROME CORONAVIRUS 2 (SARS-COV-2) (CORONAVIRUS DISEASE [COVID-19]), AMPLIFIED PROBE TECHNIQUE, MAKING USE OF HIGH THROUGHPUT TECHNOLOGIES AS DESCRIBED BY CMS-2020-01-R: HCPCS | Performed by: FAMILY MEDICINE

## 2021-06-13 LAB
SARS-COV-2 RNA RESP QL NAA+PROBE: NORMAL
SPECIMEN SOURCE: NORMAL

## 2021-06-14 LAB
LABORATORY COMMENT REPORT: NORMAL
SARS-COV-2 RNA RESP QL NAA+PROBE: NEGATIVE
SPECIMEN SOURCE: NORMAL

## 2021-07-27 DIAGNOSIS — I10 BENIGN ESSENTIAL HYPERTENSION: ICD-10-CM

## 2021-07-27 RX ORDER — LOSARTAN POTASSIUM 100 MG/1
TABLET ORAL
Qty: 90 TABLET | Refills: 0 | Status: SHIPPED | OUTPATIENT
Start: 2021-07-27 | End: 2021-10-28

## 2021-08-02 DIAGNOSIS — Z12.31 VISIT FOR SCREENING MAMMOGRAM: ICD-10-CM

## 2021-08-02 PROCEDURE — 77067 SCR MAMMO BI INCL CAD: CPT | Mod: TC | Performed by: RADIOLOGY

## 2021-09-07 DIAGNOSIS — F43.23 SITUATIONAL MIXED ANXIETY AND DEPRESSIVE DISORDER: ICD-10-CM

## 2021-09-07 DIAGNOSIS — F41.9 ANXIETY: ICD-10-CM

## 2021-09-08 DIAGNOSIS — F41.9 ANXIETY: ICD-10-CM

## 2021-09-08 DIAGNOSIS — F43.23 SITUATIONAL MIXED ANXIETY AND DEPRESSIVE DISORDER: ICD-10-CM

## 2021-09-09 DIAGNOSIS — J33.9 NASAL POLYPOSIS: ICD-10-CM

## 2021-09-09 DIAGNOSIS — J32.4 CHRONIC PANSINUSITIS: ICD-10-CM

## 2021-09-10 RX ORDER — CITALOPRAM HYDROBROMIDE 20 MG/1
TABLET ORAL
Qty: 90 TABLET | Refills: 0 | OUTPATIENT
Start: 2021-09-10

## 2021-09-10 RX ORDER — CITALOPRAM HYDROBROMIDE 20 MG/1
TABLET ORAL
Qty: 90 TABLET | Refills: 0 | Status: SHIPPED | OUTPATIENT
Start: 2021-09-10 | End: 2021-12-08

## 2021-09-10 NOTE — TELEPHONE ENCOUNTER
Prescription approved per [facility] Refill Protocol.  Suzette Mccrary RN    PHQ-9 score:    PHQ 9/9/2021   PHQ-9 Total Score 2   Q9: Thoughts of better off dead/self-harm past 2 weeks Not at all

## 2021-09-11 RX ORDER — FLUTICASONE PROPIONATE 50 MCG
2 SPRAY, SUSPENSION (ML) NASAL DAILY
Qty: 3 ML | Refills: 11 | Status: SHIPPED | OUTPATIENT
Start: 2021-09-11 | End: 2022-07-06

## 2021-09-26 ENCOUNTER — HEALTH MAINTENANCE LETTER (OUTPATIENT)
Age: 56
End: 2021-09-26

## 2021-10-26 DIAGNOSIS — I10 BENIGN ESSENTIAL HYPERTENSION: ICD-10-CM

## 2021-10-28 RX ORDER — LOSARTAN POTASSIUM 100 MG/1
TABLET ORAL
Qty: 90 TABLET | Refills: 1 | Status: SHIPPED | OUTPATIENT
Start: 2021-10-28 | End: 2022-04-27

## 2021-10-28 NOTE — TELEPHONE ENCOUNTER
"Prescription approved per [facility] Refill Protocol.  Requested Prescriptions   Pending Prescriptions Disp Refills     losartan (COZAAR) 100 MG tablet [Pharmacy Med Name: Losartan Potassium 100 MG Oral Tablet] 90 tablet 0     Sig: Take 1 tablet by mouth once daily       Angiotensin-II Receptors Passed - 10/26/2021  9:44 AM        Passed - Last blood pressure under 140/90 in past 12 months     BP Readings from Last 3 Encounters:   06/10/21 136/80   03/02/21 138/76   09/30/20 132/60             Passed - Recent (12 mo) or future (30 days) visit within the authorizing provider's specialty     Patient has had an office visit with the authorizing provider or a provider within the authorizing providers department within the previous 12 mos or has a future within next 30 days. See \"Patient Info\" tab in inbasket, or \"Choose Columns\" in Meds & Orders section of the refill encounter.              Passed - Medication is active on med list        Passed - Patient is age 18 or older        Passed - No active pregnancy on record        Passed - Normal serum creatinine on file in past 12 months     Recent Labs   Lab Test 03/02/21  1000   CR 0.55       Ok to refill medication if creatinine is low          Passed - Normal serum potassium on file in past 12 months     Recent Labs   Lab Test 06/10/21  1021   POTASSIUM 4.2                    Passed - No positive pregnancy test in past 12 months           Lorin Delacruz RN on 10/28/2021 at 4:06 PM    "

## 2021-12-06 DIAGNOSIS — F43.23 SITUATIONAL MIXED ANXIETY AND DEPRESSIVE DISORDER: ICD-10-CM

## 2021-12-06 DIAGNOSIS — F41.9 ANXIETY: ICD-10-CM

## 2021-12-08 RX ORDER — CITALOPRAM HYDROBROMIDE 20 MG/1
TABLET ORAL
Qty: 90 TABLET | Refills: 0 | Status: SHIPPED | OUTPATIENT
Start: 2021-12-08 | End: 2021-12-29

## 2021-12-28 DIAGNOSIS — F43.23 SITUATIONAL MIXED ANXIETY AND DEPRESSIVE DISORDER: ICD-10-CM

## 2021-12-28 DIAGNOSIS — F41.9 ANXIETY: ICD-10-CM

## 2021-12-28 DIAGNOSIS — J44.9 CHRONIC OBSTRUCTIVE PULMONARY DISEASE, UNSPECIFIED COPD TYPE (H): ICD-10-CM

## 2021-12-29 RX ORDER — MOMETASONE FUROATE AND FORMOTEROL FUMARATE DIHYDRATE 200; 5 UG/1; UG/1
AEROSOL RESPIRATORY (INHALATION)
Qty: 13 G | Refills: 1 | Status: SHIPPED | OUTPATIENT
Start: 2021-12-29 | End: 2022-05-10

## 2021-12-29 RX ORDER — CITALOPRAM HYDROBROMIDE 20 MG/1
TABLET ORAL
Qty: 90 TABLET | Refills: 0 | Status: SHIPPED | OUTPATIENT
Start: 2021-12-29 | End: 2022-06-14

## 2022-02-17 DIAGNOSIS — J33.9 NASAL POLYPOSIS: ICD-10-CM

## 2022-02-17 DIAGNOSIS — J31.0 CHRONIC RHINITIS: ICD-10-CM

## 2022-02-21 RX ORDER — AZELASTINE HCL 205.5 UG/1
2 SPRAY NASAL 2 TIMES DAILY
Qty: 30 ML | Refills: 11 | OUTPATIENT
Start: 2022-02-21

## 2022-02-23 ENCOUNTER — MYC MEDICAL ADVICE (OUTPATIENT)
Dept: FAMILY MEDICINE | Facility: CLINIC | Age: 57
End: 2022-02-23
Payer: COMMERCIAL

## 2022-02-23 DIAGNOSIS — J31.0 CHRONIC RHINITIS: ICD-10-CM

## 2022-02-23 DIAGNOSIS — J33.9 NASAL POLYPOSIS: ICD-10-CM

## 2022-02-25 RX ORDER — AZELASTINE HCL 205.5 UG/1
2 SPRAY NASAL 2 TIMES DAILY
Qty: 30 ML | Refills: 11 | Status: SHIPPED | OUTPATIENT
Start: 2022-02-25 | End: 2022-09-01

## 2022-02-25 NOTE — TELEPHONE ENCOUNTER
"Routing refill request to provider for review/approval because:  Was previously prescribed by ENT last visit was in 2018    Requested Prescriptions   Pending Prescriptions Disp Refills     azelastine (ASTEPRO) 0.15 % nasal spray 30 mL 11     Sig: Spray 2 sprays into both nostrils 2 times daily       Antihistamines Protocol Passed - 2/23/2022  4:27 PM        Passed - Patient is 3-64 years of age     Apply weight-based dosing for peds patients age 3 - 12 years of age.    Forward request to provider for patients under the age of 3 or over the age of 64.          Passed - Recent (12 mo) or future (30 days) visit within the authorizing provider's specialty     Patient has had an office visit with the authorizing provider or a provider within the authorizing providers department within the previous 12 mos or has a future within next 30 days. See \"Patient Info\" tab in inbasket, or \"Choose Columns\" in Meds & Orders section of the refill encounter.              Passed - Medication is active on med list       Nasal Allergy Protocol Passed - 2/23/2022  4:27 PM        Passed - Patient is age 12 or older        Passed - Recent (12 mo) or future (30 days) visit within the authorizing provider's specialty     Patient has had an office visit with the authorizing provider or a provider within the authorizing providers department within the previous 12 mos or has a future within next 30 days. See \"Patient Info\" tab in inbasket, or \"Choose Columns\" in Meds & Orders section of the refill encounter.              Passed - Medication is active on med list           Sindhu Morgan RN          "

## 2022-03-06 DIAGNOSIS — I10 BENIGN ESSENTIAL HYPERTENSION: ICD-10-CM

## 2022-03-07 RX ORDER — CARVEDILOL PHOSPHATE 40 MG/1
CAPSULE, EXTENDED RELEASE ORAL
Qty: 90 CAPSULE | Refills: 0 | Status: SHIPPED | OUTPATIENT
Start: 2022-03-07 | End: 2022-06-14

## 2022-03-07 NOTE — TELEPHONE ENCOUNTER
"Prescription approved per [facility] Refill Protocol.    Requested Prescriptions   Pending Prescriptions Disp Refills     carvedilol ER (COREG CR) 40 MG 24 hr capsule [Pharmacy Med Name: Carvedilol Phosphate ER 40 MG Oral Capsule Extended Release 24 Hour] 90 capsule 0     Sig: Take 1 capsule by mouth once daily       Beta-Blockers Protocol Passed - 3/6/2022  7:37 AM        Passed - Blood pressure under 140/90 in past 12 months     BP Readings from Last 3 Encounters:   06/10/21 136/80   03/02/21 138/76   09/30/20 132/60                 Passed - Patient is age 6 or older        Passed - Recent (12 mo) or future (30 days) visit within the authorizing provider's specialty     Patient has had an office visit with the authorizing provider or a provider within the authorizing providers department within the previous 12 mos or has a future within next 30 days. See \"Patient Info\" tab in inbasket, or \"Choose Columns\" in Meds & Orders section of the refill encounter.              Passed - Medication is active on med ermias VAIL RN on 3/7/2022 at 3:13 PM      "

## 2022-04-04 DIAGNOSIS — E87.6 HYPOKALEMIA: ICD-10-CM

## 2022-04-06 RX ORDER — POTASSIUM CHLORIDE 750 MG/1
TABLET, EXTENDED RELEASE ORAL
Qty: 90 TABLET | Refills: 0 | Status: SHIPPED | OUTPATIENT
Start: 2022-04-06 | End: 2022-07-06

## 2022-04-06 NOTE — TELEPHONE ENCOUNTER
Prescription approved per [facility] Refill Protocol.   Sindhu Morgan RN

## 2022-04-26 DIAGNOSIS — I10 BENIGN ESSENTIAL HYPERTENSION: ICD-10-CM

## 2022-04-27 RX ORDER — LOSARTAN POTASSIUM 100 MG/1
TABLET ORAL
Qty: 90 TABLET | Refills: 0 | Status: SHIPPED | OUTPATIENT
Start: 2022-04-27 | End: 2022-07-06

## 2022-04-27 RX ORDER — AMLODIPINE BESYLATE 2.5 MG/1
TABLET ORAL
Qty: 90 TABLET | Refills: 0 | Status: SHIPPED | OUTPATIENT
Start: 2022-04-27 | End: 2022-07-06

## 2022-04-27 RX ORDER — HYDROCHLOROTHIAZIDE 25 MG/1
TABLET ORAL
Qty: 90 TABLET | Refills: 0 | Status: SHIPPED | OUTPATIENT
Start: 2022-04-27 | End: 2022-07-06

## 2022-04-28 NOTE — TELEPHONE ENCOUNTER
Medication is being filled for 1 time refill only due to:  Patient needs to be seen because due for a visit .   losartan (COZAAR), amLODIPine (NORVASC), hydrochlorothiazide (HYDRODIURIL)  mychart message sent.   Sindhu Morgan RN

## 2022-05-08 ENCOUNTER — HEALTH MAINTENANCE LETTER (OUTPATIENT)
Age: 57
End: 2022-05-08

## 2022-05-09 DIAGNOSIS — J44.9 CHRONIC OBSTRUCTIVE PULMONARY DISEASE, UNSPECIFIED COPD TYPE (H): ICD-10-CM

## 2022-05-10 RX ORDER — MOMETASONE FUROATE AND FORMOTEROL FUMARATE DIHYDRATE 200; 5 UG/1; UG/1
AEROSOL RESPIRATORY (INHALATION)
Qty: 13 G | Refills: 0 | Status: SHIPPED | OUTPATIENT
Start: 2022-05-10 | End: 2022-07-06

## 2022-05-10 NOTE — TELEPHONE ENCOUNTER
Prescription approved per [facility] Refill Protocol.    Sindhu Morgan RN

## 2022-06-12 ASSESSMENT — ANXIETY QUESTIONNAIRES
6. BECOMING EASILY ANNOYED OR IRRITABLE: NOT AT ALL
4. TROUBLE RELAXING: NOT AT ALL
GAD7 TOTAL SCORE: 0
5. BEING SO RESTLESS THAT IT IS HARD TO SIT STILL: NOT AT ALL
3. WORRYING TOO MUCH ABOUT DIFFERENT THINGS: NOT AT ALL
2. NOT BEING ABLE TO STOP OR CONTROL WORRYING: NOT AT ALL
1. FEELING NERVOUS, ANXIOUS, OR ON EDGE: NOT AT ALL
7. FEELING AFRAID AS IF SOMETHING AWFUL MIGHT HAPPEN: NOT AT ALL

## 2022-06-13 ENCOUNTER — MYC MEDICAL ADVICE (OUTPATIENT)
Dept: FAMILY MEDICINE | Facility: CLINIC | Age: 57
End: 2022-06-13
Payer: COMMERCIAL

## 2022-06-13 DIAGNOSIS — F41.9 ANXIETY: ICD-10-CM

## 2022-06-13 DIAGNOSIS — F43.23 SITUATIONAL MIXED ANXIETY AND DEPRESSIVE DISORDER: ICD-10-CM

## 2022-06-13 DIAGNOSIS — I10 BENIGN ESSENTIAL HYPERTENSION: ICD-10-CM

## 2022-06-14 RX ORDER — HYDRALAZINE HYDROCHLORIDE 25 MG/1
TABLET, FILM COATED ORAL
Qty: 360 TABLET | Refills: 0 | OUTPATIENT
Start: 2022-06-14

## 2022-06-14 RX ORDER — CARVEDILOL PHOSPHATE 40 MG/1
CAPSULE, EXTENDED RELEASE ORAL
Qty: 90 CAPSULE | Refills: 0 | Status: SHIPPED | OUTPATIENT
Start: 2022-06-14 | End: 2022-07-06

## 2022-06-14 RX ORDER — CITALOPRAM HYDROBROMIDE 20 MG/1
TABLET ORAL
Qty: 90 TABLET | Refills: 0 | OUTPATIENT
Start: 2022-06-14

## 2022-06-14 RX ORDER — CITALOPRAM HYDROBROMIDE 20 MG/1
TABLET ORAL
Qty: 30 TABLET | Refills: 0 | Status: SHIPPED | OUTPATIENT
Start: 2022-06-14 | End: 2022-07-06

## 2022-06-14 RX ORDER — HYDRALAZINE HYDROCHLORIDE 25 MG/1
TABLET, FILM COATED ORAL
Qty: 120 TABLET | Refills: 0 | Status: SHIPPED | OUTPATIENT
Start: 2022-06-14 | End: 2022-07-06

## 2022-06-14 NOTE — TELEPHONE ENCOUNTER
Prescription approved per [facility] Refill Protocol.  Patient is scheduled on 7/6/22.   Sindhu Morgan RN

## 2022-07-05 ASSESSMENT — ANXIETY QUESTIONNAIRES
2. NOT BEING ABLE TO STOP OR CONTROL WORRYING: NOT AT ALL
4. TROUBLE RELAXING: NOT AT ALL
8. IF YOU CHECKED OFF ANY PROBLEMS, HOW DIFFICULT HAVE THESE MADE IT FOR YOU TO DO YOUR WORK, TAKE CARE OF THINGS AT HOME, OR GET ALONG WITH OTHER PEOPLE?: NOT DIFFICULT AT ALL
GAD7 TOTAL SCORE: 0
1. FEELING NERVOUS, ANXIOUS, OR ON EDGE: NOT AT ALL
7. FEELING AFRAID AS IF SOMETHING AWFUL MIGHT HAPPEN: NOT AT ALL
7. FEELING AFRAID AS IF SOMETHING AWFUL MIGHT HAPPEN: NOT AT ALL
6. BECOMING EASILY ANNOYED OR IRRITABLE: NOT AT ALL
3. WORRYING TOO MUCH ABOUT DIFFERENT THINGS: NOT AT ALL
5. BEING SO RESTLESS THAT IT IS HARD TO SIT STILL: NOT AT ALL

## 2022-07-06 ENCOUNTER — OFFICE VISIT (OUTPATIENT)
Dept: FAMILY MEDICINE | Facility: CLINIC | Age: 57
End: 2022-07-06
Payer: COMMERCIAL

## 2022-07-06 VITALS
OXYGEN SATURATION: 96 % | BODY MASS INDEX: 39.16 KG/M2 | WEIGHT: 207.4 LBS | HEIGHT: 61 IN | DIASTOLIC BLOOD PRESSURE: 80 MMHG | SYSTOLIC BLOOD PRESSURE: 132 MMHG | HEART RATE: 74 BPM | TEMPERATURE: 98.2 F

## 2022-07-06 DIAGNOSIS — Z12.11 SCREEN FOR COLON CANCER: ICD-10-CM

## 2022-07-06 DIAGNOSIS — J44.9 CHRONIC OBSTRUCTIVE PULMONARY DISEASE, UNSPECIFIED COPD TYPE (H): ICD-10-CM

## 2022-07-06 DIAGNOSIS — E66.01 MORBID OBESITY (H): ICD-10-CM

## 2022-07-06 DIAGNOSIS — F43.23 SITUATIONAL MIXED ANXIETY AND DEPRESSIVE DISORDER: ICD-10-CM

## 2022-07-06 DIAGNOSIS — I10 BENIGN ESSENTIAL HYPERTENSION: ICD-10-CM

## 2022-07-06 DIAGNOSIS — Z23 ENCOUNTER FOR IMMUNIZATION: ICD-10-CM

## 2022-07-06 DIAGNOSIS — J33.9 NASAL POLYPOSIS: ICD-10-CM

## 2022-07-06 DIAGNOSIS — L30.9 ECZEMA, UNSPECIFIED TYPE: ICD-10-CM

## 2022-07-06 DIAGNOSIS — F41.9 ANXIETY: ICD-10-CM

## 2022-07-06 DIAGNOSIS — E87.6 HYPOKALEMIA: ICD-10-CM

## 2022-07-06 DIAGNOSIS — J32.4 CHRONIC PANSINUSITIS: ICD-10-CM

## 2022-07-06 DIAGNOSIS — G25.81 RESTLESS LEGS SYNDROME (RLS): ICD-10-CM

## 2022-07-06 LAB
ANION GAP SERPL CALCULATED.3IONS-SCNC: 5 MMOL/L (ref 3–14)
BUN SERPL-MCNC: 29 MG/DL (ref 7–30)
CALCIUM SERPL-MCNC: 9.5 MG/DL (ref 8.5–10.1)
CHLORIDE BLD-SCNC: 102 MMOL/L (ref 94–109)
CHOLEST SERPL-MCNC: 196 MG/DL
CO2 SERPL-SCNC: 28 MMOL/L (ref 20–32)
CREAT SERPL-MCNC: 0.52 MG/DL (ref 0.52–1.04)
FASTING STATUS PATIENT QL REPORTED: NO
FERRITIN SERPL-MCNC: 32 NG/ML (ref 8–252)
GFR SERPL CREATININE-BSD FRML MDRD: >90 ML/MIN/1.73M2
GLUCOSE BLD-MCNC: 96 MG/DL (ref 70–99)
HDLC SERPL-MCNC: 74 MG/DL
LDLC SERPL CALC-MCNC: 97 MG/DL
NONHDLC SERPL-MCNC: 122 MG/DL
POTASSIUM BLD-SCNC: 4.3 MMOL/L (ref 3.4–5.3)
SODIUM SERPL-SCNC: 135 MMOL/L (ref 133–144)
TRIGL SERPL-MCNC: 127 MG/DL

## 2022-07-06 PROCEDURE — 96127 BRIEF EMOTIONAL/BEHAV ASSMT: CPT | Performed by: FAMILY MEDICINE

## 2022-07-06 PROCEDURE — 82728 ASSAY OF FERRITIN: CPT | Performed by: FAMILY MEDICINE

## 2022-07-06 PROCEDURE — 99214 OFFICE O/P EST MOD 30 MIN: CPT | Mod: 25 | Performed by: FAMILY MEDICINE

## 2022-07-06 PROCEDURE — 90471 IMMUNIZATION ADMIN: CPT | Performed by: FAMILY MEDICINE

## 2022-07-06 PROCEDURE — 36415 COLL VENOUS BLD VENIPUNCTURE: CPT | Performed by: FAMILY MEDICINE

## 2022-07-06 PROCEDURE — 80061 LIPID PANEL: CPT | Performed by: FAMILY MEDICINE

## 2022-07-06 PROCEDURE — 80048 BASIC METABOLIC PNL TOTAL CA: CPT | Performed by: FAMILY MEDICINE

## 2022-07-06 PROCEDURE — 90677 PCV20 VACCINE IM: CPT | Performed by: FAMILY MEDICINE

## 2022-07-06 RX ORDER — POTASSIUM CHLORIDE 750 MG/1
TABLET, EXTENDED RELEASE ORAL
Qty: 90 TABLET | Refills: 3 | Status: SHIPPED | OUTPATIENT
Start: 2022-07-06 | End: 2023-06-26

## 2022-07-06 RX ORDER — CARVEDILOL PHOSPHATE 40 MG/1
CAPSULE, EXTENDED RELEASE ORAL
Qty: 90 CAPSULE | Refills: 3 | Status: SHIPPED | OUTPATIENT
Start: 2022-07-06 | End: 2023-08-21

## 2022-07-06 RX ORDER — ALBUTEROL SULFATE 90 UG/1
AEROSOL, METERED RESPIRATORY (INHALATION)
Qty: 18 G | Refills: 3 | Status: SHIPPED | OUTPATIENT
Start: 2022-07-06

## 2022-07-06 RX ORDER — HYDROCHLOROTHIAZIDE 25 MG/1
25 TABLET ORAL DAILY
Qty: 90 TABLET | Refills: 3 | Status: SHIPPED | OUTPATIENT
Start: 2022-07-06 | End: 2023-06-26

## 2022-07-06 RX ORDER — HYDRALAZINE HYDROCHLORIDE 25 MG/1
TABLET, FILM COATED ORAL
Qty: 120 TABLET | Refills: 0 | Status: SHIPPED | OUTPATIENT
Start: 2022-07-06 | End: 2022-08-01

## 2022-07-06 RX ORDER — MOMETASONE FUROATE AND FORMOTEROL FUMARATE DIHYDRATE 200; 5 UG/1; UG/1
AEROSOL RESPIRATORY (INHALATION)
Qty: 13 G | Refills: 11 | Status: SHIPPED | OUTPATIENT
Start: 2022-07-06 | End: 2023-07-24

## 2022-07-06 RX ORDER — AMLODIPINE BESYLATE 2.5 MG/1
2.5 TABLET ORAL DAILY
Qty: 90 TABLET | Refills: 3 | Status: SHIPPED | OUTPATIENT
Start: 2022-07-06 | End: 2023-04-28

## 2022-07-06 RX ORDER — ROPINIROLE 0.25 MG/1
0.25 TABLET, FILM COATED ORAL AT BEDTIME
Qty: 30 TABLET | Refills: 1 | Status: SHIPPED | OUTPATIENT
Start: 2022-07-06 | End: 2022-07-11

## 2022-07-06 RX ORDER — CITALOPRAM HYDROBROMIDE 20 MG/1
TABLET ORAL
Qty: 30 TABLET | Refills: 0 | Status: SHIPPED | OUTPATIENT
Start: 2022-07-06 | End: 2022-08-01

## 2022-07-06 RX ORDER — TRIAMCINOLONE ACETONIDE 1 MG/G
CREAM TOPICAL 2 TIMES DAILY
Qty: 60 G | Refills: 1 | Status: SHIPPED | OUTPATIENT
Start: 2022-07-06

## 2022-07-06 RX ORDER — FLUTICASONE PROPIONATE 50 MCG
2 SPRAY, SUSPENSION (ML) NASAL DAILY
Qty: 3 ML | Refills: 11 | Status: SHIPPED | OUTPATIENT
Start: 2022-07-06 | End: 2023-10-02

## 2022-07-06 RX ORDER — LOSARTAN POTASSIUM 100 MG/1
100 TABLET ORAL DAILY
Qty: 90 TABLET | Refills: 3 | Status: SHIPPED | OUTPATIENT
Start: 2022-07-06 | End: 2023-07-24

## 2022-07-06 ASSESSMENT — ANXIETY QUESTIONNAIRES: GAD7 TOTAL SCORE: 0

## 2022-07-06 ASSESSMENT — PATIENT HEALTH QUESTIONNAIRE - PHQ9: SUM OF ALL RESPONSES TO PHQ QUESTIONS 1-9: 1

## 2022-07-06 NOTE — PROGRESS NOTES
Assessment & Plan     Benign essential hypertension  controlled  - BASIC METABOLIC PANEL; Future  - amLODIPine (NORVASC) 2.5 MG tablet; Take 1 tablet (2.5 mg) by mouth daily  - carvedilol ER (COREG CR) 40 MG 24 hr capsule; Take 1 capsule by mouth once daily  - hydrALAZINE (APRESOLINE) 25 MG tablet; TAKE 1 TABLET BY MOUTH 4 TIMES DAILY FOR HIGH BLOOD PRESSURE  - hydrochlorothiazide (HYDRODIURIL) 25 MG tablet; Take 1 tablet (25 mg) by mouth daily  - losartan (COZAAR) 100 MG tablet; Take 1 tablet (100 mg) by mouth daily  - Lipid panel reflex to direct LDL Fasting; Future    Situational mixed anxiety and depressive disorder  Stable   - citalopram (CELEXA) 20 MG tablet; +++NEED APPT+++Take 1 tablet by mouth once daily    Anxiety  Stable   - citalopram (CELEXA) 20 MG tablet; +++NEED APPT+++Take 1 tablet by mouth once daily    Chronic pansinusitis  refilled  - fluticasone (FLONASE) 50 MCG/ACT nasal spray; Spray 2 sprays into both nostrils daily    Nasal polyposis    - fluticasone (FLONASE) 50 MCG/ACT nasal spray; Spray 2 sprays into both nostrils daily    Hypokalemia    - potassium chloride ER (KLOR-CON M) 10 MEQ CR tablet; Take 1 tablet by mouth once daily    Chronic obstructive pulmonary disease, unspecified COPD type (H)  Doing well  - mometasone-formoterol (DULERA) 200-5 MCG/ACT inhaler; INHALE 2 PUFFS TWICE DAILY  - albuterol (PROAIR HFA/PROVENTIL HFA/VENTOLIN HFA) 108 (90 Base) MCG/ACT inhaler; INHALE 2 PUFFS BY MOUTH EVERY 6 HOURS    Screen for colon cancer  Advised colonoscopy or  - COLOGUARD(EXACT SCIENCES)    BMi >35  Low gerardo diet/Exercise discussed     Restless legs syndrome (RLS)    - Ferritin; Future  - rOPINIRole (REQUIP) 0.25 MG tablet; Take 1 tablet (0.25 mg) by mouth At Bedtime    Eczema, unspecified type    - triamcinolone (KENALOG) 0.1 % external cream; Apply topically 2 times daily    Encounter for immunization  Advised   - Pneumococcal 20 Valent Conjugate (Prevnar 20) 0795}     BMI:   Estimated body  "mass index is 39.26 kg/m  as calculated from the following:    Height as of this encounter: 1.548 m (5' 0.95\").    Weight as of this encounter: 94.1 kg (207 lb 6.4 oz).   Weight management plan: Discussed healthy diet and exercise guidelines    Work on weight loss  Regular exercise    Return in about 2 months (around 9/6/2022) for mammogram, Medicare wellness Exam.    Mable Cardoso MD  [facility] [place] is a 56 year old accompanied by her self, presenting for the following health issues:  Depression, Anxiety, Hypertension, COPD, Weight Check, restless leg, and Eczema      History of Present Illness       COPD:  She presents for follow up of COPD.  Overall, COPD symptoms are stable since last visit. She has less than usual fatigue or shortness of breath with exertion and no shortness of breath at rest.She rarely coughs and does not have change in sputum. No recent fever. She can walk greater than 2 miles without stopping to rest. She can walk 3 or more flights of stairs without resting.The patient has had no ED, urgent care, or hospital admissions because of COPD since the last visit.     Mental Health Follow-up:  Patient presents to follow-up on Anxiety.    Patient's anxiety since last visit has been:  Good  The patient is not having other symptoms associated with anxiety.  Any significant life events: No  Patient is not feeling anxious or having panic attacks.  Patient has no concerns about alcohol or drug use.    Hypertension: She presents for follow up of hypertension.  She does check blood pressure  regularly outside of the clinic. Outpatient blood pressures have not been over 140/90. She follows a low salt diet.    Today's CANDELARIO-7 Score: 0  Pt  also has RLS and wanyts some help  She gets eczema on her leg and wants some cream    Review of Systems   CONSTITUTIONAL: NEGATIVE for fever, chills, change in weight  ENT/MOUTH: NEGATIVE for ear, mouth and throat problems  RESP: " "NEGATIVE for significant cough or SOB  CV: NEGATIVE for chest pain, palpitations or peripheral edema  GI: NEGATIVE for nausea, abdominal pain, heartburn, or change in bowel habits  PSYCHIATRIC: NEGATIVE for changes in mood or affect  ROS otherwise negative      Objective    /80   Pulse 74   Temp 98.2  F (36.8  C) (Oral)   Ht 1.548 m (5' 0.95\")   Wt 94.1 kg (207 lb 6.4 oz)   LMP 12/28/2015 (Approximate)   SpO2 96%   BMI 39.26 kg/m    Body mass index is 39.26 kg/m .  Physical Exam   GENERAL: healthy, alert and no distress, obese  NECK: no adenopathy, no asymmetry, masses, or scars and thyroid normal to palpation  RESP: lungs clear to auscultation - no rales, rhonchi or wheezes  CV: regular rate and rhythm, normal S1 S2, no S3 or S4, no murmur, click or rub, no peripheral edema and peripheral pulses strong  ABDOMEN: soft, nontender, no hepatosplenomegaly, no masses and bowel sounds normal  MS: no gross musculoskeletal defects noted, no edema  Skin -has Mild eczema    Pending             .  ..  "

## 2022-08-01 DIAGNOSIS — F41.9 ANXIETY: ICD-10-CM

## 2022-08-01 DIAGNOSIS — F43.23 SITUATIONAL MIXED ANXIETY AND DEPRESSIVE DISORDER: ICD-10-CM

## 2022-08-01 DIAGNOSIS — I10 BENIGN ESSENTIAL HYPERTENSION: ICD-10-CM

## 2022-08-01 RX ORDER — HYDRALAZINE HYDROCHLORIDE 25 MG/1
TABLET, FILM COATED ORAL
Qty: 120 TABLET | Refills: 0 | Status: SHIPPED | OUTPATIENT
Start: 2022-08-01 | End: 2022-08-15

## 2022-08-01 RX ORDER — CITALOPRAM HYDROBROMIDE 20 MG/1
TABLET ORAL
Qty: 30 TABLET | Refills: 0 | Status: SHIPPED | OUTPATIENT
Start: 2022-08-01 | End: 2022-08-15

## 2022-08-01 NOTE — TELEPHONE ENCOUNTER
"Requested Prescriptions   Signed Prescriptions Disp Refills    hydrALAZINE (APRESOLINE) 25 MG tablet 120 tablet 0     Sig: TAKE 1 TABLET BY MOUTH 4 TIMES DAILY FOR HIGH BLOOD PRESSURE       Vasodilators Passed - 8/1/2022  6:46 AM        Passed - Most recent BP less than 140/90 on record     BP Readings from Last 3 Encounters:   07/06/22 132/80   06/10/21 136/80   03/02/21 138/76                 Passed - Most recent encounter is not a hospital encounter. Patient has recent (12 mos) or future (1 mos) visit with authorizing provider's specialty     Patient's most recent encounter is NOT a hospital encounter and has had an office visit in the last 12 months or has a visit in the next 30 days with authorizing provider or within the authorizing provider's specialty.      See \"Patient Info\" tab in inbasket, or \"Choose Columns\" in Meds & Orders section of the refill encounter.      If most recent encounter is a hospital encounter AND the patient does NOT have an appointment scheduled with the authorizing provider or authorizing provider's specialty within the next 30 days, forward refill to authorizing provider for medication review.          Passed - Medication is active on med list        Passed - Patient is of age 18 years or older        Passed - Patient is not pregnant        Passed - Patient has not had a positive pregnancy test within the past 12 months          citalopram (CELEXA) 20 MG tablet 30 tablet 0     Sig: TAKE 1 TABLET BY MOUTH ONCE DAILY . APPOINTMENT REQUIRED FOR FUTURE REFILLS       [facility] Protocol Passed - 8/1/2022  6:46 AM        Passed - PHQ-9 score less than 5 in past 6 months     Please review last PHQ-9 score.           Passed - Medication is active on med list        Passed - Patient is age 18 or older        Passed - No active pregnancy on record        Passed - No positive pregnancy test in last 12 months        Passed - Recent (6 mo) or future (30 days) visit within the authorizing provider's " "specialty     Patient had office visit in the last 6 months or has a visit in the next 30 days with authorizing provider or within the authorizing provider's specialty.  See \"Patient Info\" tab in inbasket, or \"Choose Columns\" in Meds & Orders section of the refill encounter.               Thanks,  ALBINA Lr  [facility]     "

## 2022-08-03 LAB — NONINV COLON CA DNA+OCC BLD SCRN STL QL: NEGATIVE

## 2022-08-08 ASSESSMENT — ENCOUNTER SYMPTOMS
NAUSEA: 0
SORE THROAT: 0
MYALGIAS: 0
PARESTHESIAS: 0
SHORTNESS OF BREATH: 0
ABDOMINAL PAIN: 0
JOINT SWELLING: 0
BREAST MASS: 0
HEADACHES: 0
HEARTBURN: 0
HEMATURIA: 0
DYSURIA: 0
FEVER: 0
DIARRHEA: 0
DIZZINESS: 0
EYE PAIN: 0
CONSTIPATION: 0
COUGH: 0
HEMATOCHEZIA: 0
CHILLS: 0
PALPITATIONS: 0
ARTHRALGIAS: 0
FREQUENCY: 0
WEAKNESS: 0
NERVOUS/ANXIOUS: 0

## 2022-08-15 ENCOUNTER — OFFICE VISIT (OUTPATIENT)
Dept: FAMILY MEDICINE | Facility: CLINIC | Age: 57
End: 2022-08-15
Payer: COMMERCIAL

## 2022-08-15 VITALS
DIASTOLIC BLOOD PRESSURE: 80 MMHG | OXYGEN SATURATION: 97 % | HEIGHT: 60 IN | SYSTOLIC BLOOD PRESSURE: 130 MMHG | WEIGHT: 208 LBS | HEART RATE: 74 BPM | BODY MASS INDEX: 40.84 KG/M2 | TEMPERATURE: 97.4 F

## 2022-08-15 DIAGNOSIS — E66.01 MORBID OBESITY (H): Primary | ICD-10-CM

## 2022-08-15 DIAGNOSIS — F43.23 ADJUSTMENT DISORDER WITH MIXED ANXIETY AND DEPRESSED MOOD: ICD-10-CM

## 2022-08-15 DIAGNOSIS — J43.8 OTHER EMPHYSEMA (H): ICD-10-CM

## 2022-08-15 DIAGNOSIS — I10 BENIGN ESSENTIAL HYPERTENSION: ICD-10-CM

## 2022-08-15 DIAGNOSIS — F43.23 SITUATIONAL MIXED ANXIETY AND DEPRESSIVE DISORDER: ICD-10-CM

## 2022-08-15 DIAGNOSIS — F41.9 ANXIETY: ICD-10-CM

## 2022-08-15 DIAGNOSIS — G25.81 RESTLESS LEGS SYNDROME (RLS): ICD-10-CM

## 2022-08-15 DIAGNOSIS — Z12.4 CERVICAL CANCER SCREENING: ICD-10-CM

## 2022-08-15 DIAGNOSIS — Z00.00 ROUTINE HISTORY AND PHYSICAL EXAMINATION OF ADULT: ICD-10-CM

## 2022-08-15 DIAGNOSIS — Z23 HIGH PRIORITY FOR 2019-NCOV VACCINE: ICD-10-CM

## 2022-08-15 PROCEDURE — G0145 SCR C/V CYTO,THINLAYER,RESCR: HCPCS | Performed by: FAMILY MEDICINE

## 2022-08-15 PROCEDURE — 0054A COVID-19,PF,PFIZER (12+ YRS): CPT | Performed by: FAMILY MEDICINE

## 2022-08-15 PROCEDURE — 87624 HPV HI-RISK TYP POOLED RSLT: CPT | Performed by: FAMILY MEDICINE

## 2022-08-15 PROCEDURE — 99396 PREV VISIT EST AGE 40-64: CPT | Mod: 25 | Performed by: FAMILY MEDICINE

## 2022-08-15 PROCEDURE — 91305 COVID-19,PF,PFIZER (12+ YRS): CPT | Performed by: FAMILY MEDICINE

## 2022-08-15 RX ORDER — ROPINIROLE 0.25 MG/1
0.5 TABLET, FILM COATED ORAL AT BEDTIME
Qty: 180 TABLET | Refills: 3 | Status: SHIPPED | OUTPATIENT
Start: 2022-08-15 | End: 2023-01-24

## 2022-08-15 RX ORDER — HYDRALAZINE HYDROCHLORIDE 25 MG/1
TABLET, FILM COATED ORAL
Qty: 360 TABLET | Refills: 3 | Status: SHIPPED | OUTPATIENT
Start: 2022-08-15 | End: 2023-09-21

## 2022-08-15 RX ORDER — CITALOPRAM HYDROBROMIDE 20 MG/1
TABLET ORAL
Qty: 90 TABLET | Refills: 1 | Status: SHIPPED | OUTPATIENT
Start: 2022-08-15 | End: 2023-03-14

## 2022-08-15 ASSESSMENT — ENCOUNTER SYMPTOMS
EYE PAIN: 0
SHORTNESS OF BREATH: 0
HEMATURIA: 0
CHILLS: 0
SORE THROAT: 0
BREAST MASS: 0
HEARTBURN: 0
JOINT SWELLING: 0
PARESTHESIAS: 0
PALPITATIONS: 0
WEAKNESS: 0
ABDOMINAL PAIN: 0
FEVER: 0
DIZZINESS: 0
DYSURIA: 0
HEMATOCHEZIA: 0
DIARRHEA: 0
HEADACHES: 0
FREQUENCY: 0
COUGH: 0
MYALGIAS: 0
CONSTIPATION: 0
ARTHRALGIAS: 0
NERVOUS/ANXIOUS: 0
NAUSEA: 0

## 2022-08-15 ASSESSMENT — PAIN SCALES - GENERAL: PAINLEVEL: NO PAIN (0)

## 2022-08-15 NOTE — PROGRESS NOTES
SUBJECTIVE:   CC: Juanita Lechuga is an 56 year old woman who presents for preventive health visit.       Patient has been advised of split billing requirements and indicates understanding: Yes  Healthy Habits:     Getting at least 3 servings of Calcium per day:  Yes    Bi-annual eye exam:  Yes    Dental care twice a year:  Yes    Sleep apnea or symptoms of sleep apnea:  None    Diet:  Low salt, Low fat/cholesterol and Carbohydrate counting    Frequency of exercise:  2-3 days/week    Duration of exercise:  15-30 minutes    Medication side effects:  None    PHQ-2 Total Score: 0      Hearing impairment: no         COPD Follow-Up    Overall, how are your COPD symptoms since your last clinic visit?  No change    How much fatigue or shortness of breath do you have when you are walking?  Same as usual    How much shortness of breath do you have when you are resting?  Same as usual    How often do you cough? Never    Have you noticed any change in your sputum/phlegm?  No        Please describe how far you can walk without stopping to rest:  2-5 blocks    How many flights of stairs are you able to walk up without stopping?  None    Have you had any Emergency Room Visits, Urgent Care Visits, or Hospital Admissions because of your COPD since your last office visit?  No    History   Smoking Status     Former Smoker     Packs/day: 0.50     Years: 27.00     Types: Cigarettes     Quit date: 10/24/2011   Smokeless Tobacco     Former User     Quit date: 10/24/2011     Lab Results   Component Value Date    FEV1 96 03/08/2013    HAL4UUJ 86 03/08/2013         Today's PHQ-2 Score:   PHQ-2 ( 1999 Pf[employer]) 8/8/2022   Q1: Little interest or pleasure in doing things 0   Q2: Feeling down, depressed or hopeless 0   PHQ-2 Score 0   PHQ-2 Total Score (12-17 Years)- Positive if 3 or more points; Administer PHQ-A if positive -   Q1: Little interest or pleasure in doing things Not at all   Q2: Feeling down, depressed or hopeless Not at all    PHQ-2 Score 0       Abuse: Current or Past (Physical, Sexual or Emotional) - No  Do you feel safe in your environment? Yes        Social History     Tobacco Use     Smoking status: Former Smoker     Packs/day: 0.50     Years: 27.00     Pack years: 13.50     Types: Cigarettes     Quit date: 10/24/2011     Years since quitting: 10.8     Smokeless tobacco: Former User     Quit date: 10/24/2011   Substance Use Topics     Alcohol use: Not Currently     Comment: 2 drinks a month         Alcohol Use 8/8/2022   Prescreen: >3 drinks/day or >7 drinks/week? No   Prescreen: >3 drinks/day or >7 drinks/week? -       Reviewed orders with patient.  Reviewed health maintenance and updated orders accordingly - Yes  Lab work is in process  Labs reviewed in EPIC  BP Readings from Last 3 Encounters:   08/15/22 130/80   07/06/22 132/80   06/10/21 136/80    Wt Readings from Last 3 Encounters:   08/15/22 94.3 kg (208 lb)   07/06/22 94.1 kg (207 lb 6.4 oz)   06/10/21 92.3 kg (203 lb 6.4 oz)                  Patient Active Problem List   Diagnosis     Old disruption of anterior cruciate ligament     CARDIOVASCULAR SCREENING; LDL GOAL LESS THAN 130     Advanced directives, counseling/discussion     Situational mixed anxiety and depressive disorder     Adjustment disorder with mixed anxiety and depressed mood     Hypertrophy of breast     Benign essential hypertension     Morbid obesity (H)     Chronic pansinusitis     Nasal polyposis     Other emphysema (H)     Restless legs syndrome (RLS)     Eczema, unspecified type     Past Surgical History:   Procedure Laterality Date     BUNIONECTOMY RT/LT  08/07/2008    (L) first toe     BUNIONECTOMY RT/LT  09/20/2012    right first toe     ENT SURGERY  10/9/2018     OPTICAL TRACKING SYSTEM ENDOSCOPIC SINUS SURGERY Bilateral 10/09/2018    Procedure: OPTICAL TRACKING SYSTEM ENDOSCOPIC SINUS SURGERY;  Image guided endoscopic sinus surgery endoscopic septoplasty;  Surgeon: Micky Almaraz MD;   Location: [facility]     SEPTOPLASTY N/A 10/09/2018    Procedure: SEPTOPLASTY;;  Surgeon: Micky Almaraz MD;  Location: [facility] OR       Social History     Tobacco Use     Smoking status: Former Smoker     Packs/day: 0.50     Years: 27.00     Pack years: 13.50     Types: Cigarettes     Quit date: 10/24/2011     Years since quitting: 10.8     Smokeless tobacco: Former User     Quit date: 10/24/2011   Substance Use Topics     Alcohol use: Not Currently     Comment: 2 drinks a month     Family History   Problem Relation Age of Onset     Unknown/Adopted Mother         Vertigo     C.A.D. Father      Coronary Artery Disease Father      Hypertension Father      Hyperlipidemia Father      Cancer Maternal Grandmother         ?         Current Outpatient Medications   Medication Sig Dispense Refill     albuterol (PROAIR HFA/PROVENTIL HFA/VENTOLIN HFA) 108 (90 Base) MCG/ACT inhaler INHALE 2 PUFFS BY MOUTH EVERY 6 HOURS 18 g 3     amLODIPine (NORVASC) 2.5 MG tablet Take 1 tablet (2.5 mg) by mouth daily 90 tablet 3     azelastine (ASTEPRO) 0.15 % nasal spray Spray 2 sprays into both nostrils 2 times daily 30 mL 11     carvedilol ER (COREG CR) 40 MG 24 hr capsule Take 1 capsule by mouth once daily 90 capsule 3     citalopram (CELEXA) 20 MG tablet TAKE 1 TABLET BY MOUTH ONCE DAILY . APPOINTMENT REQUIRED FOR FUTURE REFILLS 90 tablet 1     ferrous sulfate (SLO-FE) 142 (45 Fe) MG CR tablet Take 1 tablet (142 mg) by mouth daily       fluticasone (FLONASE) 50 MCG/ACT nasal spray Spray 2 sprays into both nostrils daily 3 mL 11     hydrALAZINE (APRESOLINE) 25 MG tablet TAKE 1 TABLET BY MOUTH 4 TIMES DAILY FOR HIGH BLOOD PRESSURE 360 tablet 3     hydrochlorothiazide (HYDRODIURIL) 25 MG tablet Take 1 tablet (25 mg) by mouth daily 90 tablet 3     losartan (COZAAR) 100 MG tablet Take 1 tablet (100 mg) by mouth daily 90 tablet 3     MAGNESIUM PO        mometasone-formoterol (DULERA) 200-5 MCG/ACT inhaler INHALE 2 PUFFS TWICE DAILY 13 g 11      potassium chloride ER (KLOR-CON M) 10 MEQ CR tablet Take 1 tablet by mouth once daily 90 tablet 3     Probiotic Product (PROBIOTIC-10) CHEW        rOPINIRole (REQUIP) 0.25 MG tablet Take 2 tablets (0.5 mg) by mouth At Bedtime 180 tablet 3     triamcinolone (KENALOG) 0.1 % external cream Apply topically 2 times daily 60 g 1     vitamin D3 (CHOLECALCIFEROL) 50 mcg (2000 units) tablet Take 1 tablet (50 mcg) by mouth daily       MULTI-VITAMIN OR TABS 1 TABLET DAILY       Allergies   Allergen Reactions     Lisinopril Cough     Seasonal Allergies Unknown       Breast Cancer Screening:    Breast CA Risk Assessment (FHS-7) 8/8/2022   Do you have a family history of breast, colon, or ovarian cancer? No / Unknown       Pertinent mammograms are reviewed under the imaging tab.    History of abnormal Pap smear: NO - age 30-65 PAP every 5 years with negative HPV co-testing recommended  PAP / HPV Latest Ref Rng & Units 2/24/2017 12/1/2014 10/3/2011   PAP (Historical) - NIL NIL NIL   HPV16 NEG Negative - -   HPV18 NEG Negative - -   HRHPV NEG Negative - -     Reviewed and updated as needed this visit by clinical staff   Tobacco  Allergies  Meds                Reviewed and updated as needed this visit by Provider                   Past Medical History:   Diagnosis Date     ACL tear 12/09    left, improved with physical therapy     COPD (chronic obstructive pulmonary disease) (H) 3/29/2012    resolved after she quit smoking     HTN (hypertension)       Past Surgical History:   Procedure Laterality Date     BUNIONECTOMY RT/LT  08/07/2008    (L) first toe     BUNIONECTOMY RT/LT  09/20/2012    right first toe     ENT SURGERY  10/9/2018     OPTICAL TRACKING SYSTEM ENDOSCOPIC SINUS SURGERY Bilateral 10/09/2018    Procedure: OPTICAL TRACKING SYSTEM ENDOSCOPIC SINUS SURGERY;  Image guided endoscopic sinus surgery endoscopic septoplasty;  Surgeon: Micky Almaraz MD;  Location: [facility]     SEPTOPLASTY N/A 10/09/2018    Procedure:  "SEPTOPLASTY;;  Surgeon: Micky Almaraz MD;  Location: [facility]       Review of Systems   Constitutional: Negative for chills and fever.   HENT: Negative for congestion, ear pain, hearing loss and sore throat.    Eyes: Negative for pain and visual disturbance.   Respiratory: Negative for cough and shortness of breath.    Cardiovascular: Negative for chest pain, palpitations and peripheral edema.   Gastrointestinal: Negative for abdominal pain, constipation, diarrhea, heartburn, hematochezia and nausea.   Breasts:  Negative for tenderness, breast mass and discharge.   Genitourinary: Negative for dysuria, frequency, genital sores, hematuria, pelvic pain, urgency, vaginal bleeding and vaginal discharge.   Musculoskeletal: Negative for arthralgias, joint swelling and myalgias.   Skin: Negative for rash.   Neurological: Negative for dizziness, weakness, headaches and paresthesias.   Psychiatric/Behavioral: Negative for mood changes. The patient is not nervous/anxious.      CONSTITUTIONAL: NEGATIVE for fever, chills, change in weight  INTEGUMENTARU/SKIN: NEGATIVE for worrisome rashes, moles or lesions  EYES: NEGATIVE for vision changes or irritation  ENT: NEGATIVE for ear, mouth and throat problems  RESP: NEGATIVE for significant cough or SOB  BREAST: NEGATIVE for masses, tenderness or discharge  CV: NEGATIVE for chest pain, palpitations or peripheral edema  GI: NEGATIVE for nausea, abdominal pain, heartburn, or change in bowel habits  : NEGATIVE for unusual urinary or vaginal symptoms. Periods are regular.  MUSCULOSKELETAL: NEGATIVE for significant arthralgias or myalgia  NEURO: NEGATIVE for weakness, dizziness or paresthesias  PSYCHIATRIC: NEGATIVE for changes in mood or affect     OBJECTIVE:   /80   Pulse 74   Temp 97.4  F (36.3  C) (Temporal)   Ht 1.53 m (5' 0.24\")   Wt 94.3 kg (208 lb)   LMP 12/28/2015 (Approximate)   SpO2 97%   BMI 40.30 kg/m    Physical Exam  GENERAL APPEARANCE: healthy, alert and " no distress  EYES: Eyes grossly normal to inspection, PERRL and conjunctivae and sclerae normal  HENT: ear canals and TM's normal, nose and mouth without ulcers or lesions, oropharynx clear and oral mucous membranes moist  NECK: no adenopathy, no asymmetry, masses, or scars and thyroid normal to palpation  RESP: lungs clear to auscultation - no rales, rhonchi or wheezes  BREAST: normal without masses, tenderness or nipple discharge and no palpable axillary masses or adenopathy  CV: regular rate and rhythm, normal S1 S2, no S3 or S4, no murmur, click or rub, no peripheral edema and peripheral pulses strong  ABDOMEN: soft, nontender, no hepatosplenomegaly, no masses and bowel sounds normal   (female): normal female external genitalia, normal urethral meatus, vaginal mucosal atrophy noted, normal cervix, adnexae, and uterus without masses or abnormal discharge  MS: no musculoskeletal defects are noted and gait is age appropriate without ataxia  SKIN: no suspicious lesions or rashes  NEURO: Normal strength and tone, sensory exam grossly normal, mentation intact and speech normal  PSYCH: mentation appears normal and affect normal/bright    Diagnostic Test Results:  Labs reviewed in Epic    ASSESSMENT/PLAN:   (Z00.00) Routine history and physical examination of adult  Comment:   Plan:     (Z12.4) Cervical cancer screening  Comment: pap done   Plan: Pap Screen with HPV - recommended age 30 - 65         years            (J43.8) Other emphysema (H)  Comment: stable   Plan:     (I10) Benign essential hypertension  Comment: stable   Plan: hydrALAZINE (APRESOLINE) 25 MG tablet            (G25.81) Restless legs syndrome (RLS)  Comment: doing well on meds   Plan: rOPINIRole (REQUIP) 0.25 MG tablet            (F43.23) Adjustment disorder with mixed anxiety and depressed mood  Comment: stable   Plan:     (Z23) High priority for 2019-nCoV vaccine  Comment: advised   Plan: COVID-19,PF,PFIZER (12+ Yrs GRAY LABEL)            (F43.23)  "Situational mixed anxiety and depressive disorder  Comment:   Plan: citalopram (CELEXA) 20 MG tablet            (F41.9) Anxiety  Comment: stable   Plan: citalopram (CELEXA) 20 MG tablet            COUNSELING:  Reviewed preventive health counseling, as reflected in patient instructions       Regular exercise       Healthy diet/nutrition       Vision screening       Hearing screening       Alcohol Use       Osteoporosis prevention/bone health       Colorectal Cancer Screening       The 10-year ASCVD risk score (Cristina HENDRICKS Jr., et al., 2013) is: 2.9%    Values used to calculate the score:      Age: 56 years      Sex: Female      Is Non- : No      Diabetic: No      Tobacco smoker: No      Systolic Blood Pressure: 130 mmHg      Is BP treated: Yes      HDL Cholesterol: 74 mg/dL      Total Cholesterol: 196 mg/dL       Advance Care Planning    Estimated body mass index is 40.3 kg/m  as calculated from the following:    Height as of this encounter: 1.53 m (5' 0.24\").    Weight as of this encounter: 94.3 kg (208 lb).    Weight management plan: Discussed healthy diet and exercise guidelines    She reports that she quit smoking about 10 years ago. Her smoking use included cigarettes. She has a 13.50 pack-year smoking history. She quit smokeless tobacco use about 10 years ago.  Discussed weight loss will help  It will decrease risk for ascvd    Counseling Resources:  ATP IV Guidelines  Pooled Cohorts Equation Calculator  Breast Cancer Risk Calculator  BRCA-Related Cancer Risk Assessment: FHS-7 Tool  FRAX Risk Assessment  ICSI Preventive Guidelines  Dietary Guidelines for Americans, 2010  USDA's MyPlate  ASA Prophylaxis  Lung CA Screening    Mable Cardoso MD  [facility]  "

## 2022-08-17 LAB
BKR LAB AP GYN ADEQUACY: NORMAL
BKR LAB AP GYN INTERPRETATION: NORMAL
BKR LAB AP HPV REFLEX: NORMAL
BKR LAB AP PREVIOUS ABNORMAL: NORMAL
PATH REPORT.COMMENTS IMP SPEC: NORMAL
PATH REPORT.COMMENTS IMP SPEC: NORMAL
PATH REPORT.RELEVANT HX SPEC: NORMAL

## 2022-08-19 LAB
HUMAN PAPILLOMA VIRUS 16 DNA: NEGATIVE
HUMAN PAPILLOMA VIRUS 18 DNA: NEGATIVE
HUMAN PAPILLOMA VIRUS FINAL DIAGNOSIS: NORMAL
HUMAN PAPILLOMA VIRUS OTHER HR: NEGATIVE

## 2022-09-01 ENCOUNTER — TELEPHONE (OUTPATIENT)
Dept: FAMILY MEDICINE | Facility: CLINIC | Age: 57
End: 2022-09-01

## 2022-09-01 DIAGNOSIS — J31.0 CHRONIC RHINITIS: ICD-10-CM

## 2022-09-01 DIAGNOSIS — J33.9 NASAL POLYPOSIS: ICD-10-CM

## 2022-09-01 RX ORDER — AZELASTINE HCL 205.5 UG/1
2 SPRAY NASAL 2 TIMES DAILY
Qty: 30 ML | Refills: 11 | Status: SHIPPED | OUTPATIENT
Start: 2022-09-01 | End: 2022-09-01

## 2022-09-01 RX ORDER — AZELASTINE 1 MG/ML
1 SPRAY, METERED NASAL 2 TIMES DAILY
Qty: 30 ML | Refills: 1 | Status: SHIPPED | OUTPATIENT
Start: 2022-09-01 | End: 2022-12-26

## 2022-12-26 DIAGNOSIS — J31.0 CHRONIC RHINITIS: ICD-10-CM

## 2022-12-26 DIAGNOSIS — J33.9 NASAL POLYPOSIS: ICD-10-CM

## 2022-12-26 RX ORDER — AZELASTINE HYDROCHLORIDE 137 UG/1
SPRAY, METERED NASAL
Qty: 30 ML | Refills: 0 | Status: SHIPPED | OUTPATIENT
Start: 2022-12-26 | End: 2023-02-14

## 2022-12-26 NOTE — TELEPHONE ENCOUNTER
"Requested Prescriptions   Signed Prescriptions Disp Refills    Azelastine HCl 137 MCG/SPRAY SOLN 30 mL 0     Sig: Use 1 spray(s) in each nostril twice daily       Antihistamines Protocol Passed - 12/26/2022  9:36 AM        Passed - Patient is 3-64 years of age     Apply weight-based dosing for peds patients age 3 - 12 years of age.    Forward request to provider for patients under the age of 3 or over the age of 64.          Passed - Recent (12 mo) or future (30 days) visit within the authorizing provider's specialty     Patient has had an office visit with the authorizing provider or a provider within the authorizing providers department within the previous 12 mos or has a future within next 30 days. See \"Patient Info\" tab in inbasket, or \"Choose Columns\" in Meds & Orders section of the refill encounter.              Passed - Medication is active on med list       Nasal Allergy Protocol Passed - 12/26/2022  9:36 AM        Passed - Patient is age 12 or older        Passed - Recent (12 mo) or future (30 days) visit within the authorizing provider's specialty     Patient has had an office visit with the authorizing provider or a provider within the authorizing providers department within the previous 12 mos or has a future within next 30 days. See \"Patient Info\" tab in inbasket, or \"Choose Columns\" in Meds & Orders section of the refill encounter.              Passed - Medication is active on med list           Adeline Chavez RN  [facility]     "

## 2023-01-08 ENCOUNTER — HEALTH MAINTENANCE LETTER (OUTPATIENT)
Age: 58
End: 2023-01-08

## 2023-01-24 ENCOUNTER — MYC MEDICAL ADVICE (OUTPATIENT)
Dept: FAMILY MEDICINE | Facility: CLINIC | Age: 58
End: 2023-01-24
Payer: COMMERCIAL

## 2023-01-24 DIAGNOSIS — G25.81 RESTLESS LEGS SYNDROME (RLS): Primary | ICD-10-CM

## 2023-01-24 RX ORDER — ROPINIROLE 0.5 MG/1
1 TABLET, FILM COATED ORAL AT BEDTIME
Qty: 60 TABLET | Refills: 0 | Status: SHIPPED | OUTPATIENT
Start: 2023-01-24 | End: 2023-03-14

## 2023-02-14 DIAGNOSIS — J31.0 CHRONIC RHINITIS: ICD-10-CM

## 2023-02-14 DIAGNOSIS — J33.9 NASAL POLYPOSIS: ICD-10-CM

## 2023-02-14 RX ORDER — AZELASTINE HYDROCHLORIDE 137 UG/1
SPRAY, METERED NASAL
Qty: 30 ML | Refills: 0 | Status: SHIPPED | OUTPATIENT
Start: 2023-02-14 | End: 2023-03-28

## 2023-03-12 DIAGNOSIS — F43.23 SITUATIONAL MIXED ANXIETY AND DEPRESSIVE DISORDER: ICD-10-CM

## 2023-03-12 DIAGNOSIS — F41.9 ANXIETY: ICD-10-CM

## 2023-03-12 DIAGNOSIS — G25.81 RESTLESS LEGS SYNDROME (RLS): ICD-10-CM

## 2023-03-13 RX ORDER — CITALOPRAM HYDROBROMIDE 20 MG/1
TABLET ORAL
Qty: 90 TABLET | Refills: 0 | OUTPATIENT
Start: 2023-03-13

## 2023-03-13 RX ORDER — ROPINIROLE 0.5 MG/1
TABLET, FILM COATED ORAL
Qty: 60 TABLET | Refills: 0 | OUTPATIENT
Start: 2023-03-13

## 2023-03-14 RX ORDER — ROPINIROLE 0.5 MG/1
1 TABLET, FILM COATED ORAL AT BEDTIME
Qty: 60 TABLET | Refills: 0 | Status: SHIPPED | OUTPATIENT
Start: 2023-03-14 | End: 2023-03-28

## 2023-03-14 RX ORDER — CITALOPRAM HYDROBROMIDE 20 MG/1
TABLET ORAL
Qty: 30 TABLET | Refills: 0 | Status: SHIPPED | OUTPATIENT
Start: 2023-03-14 | End: 2023-03-28

## 2023-03-14 NOTE — TELEPHONE ENCOUNTER
1st attempt: Called and left message for patient to return call to clinic.    -if patient return call to clinic please inform patient that prescriptions: Citalopram Hydrobromide 20 MG Oral Tablet and rOPINIRole HCl 0.5 MG Oral Tablet was denied. Follow up appointment needed per Dr. Cardoso. Please assist patient with an appointment with Dr. Cardoso.    Ira Velásquez, CMA

## 2023-03-14 NOTE — TELEPHONE ENCOUNTER
Patient returned call and informed of need for appointment.    Patient scheduled to see Dr. Cardoso on Tuesday, March 28th at 11:40 a.m.    Patient only has about a week left of medication.    Routing to Dr. Cardoso to see if she can refill meds to get patient thru to appointment on March 28th.    Maria Dolores Atkinson,

## 2023-03-15 NOTE — TELEPHONE ENCOUNTER
LM for patient that prescriptions were sent to the pharmacy and to be sure to keep her appointment scheduled for March 28th.  Maria Dolores Atkinson,

## 2023-03-25 ASSESSMENT — ANXIETY QUESTIONNAIRES
GAD7 TOTAL SCORE: 0
3. WORRYING TOO MUCH ABOUT DIFFERENT THINGS: NOT AT ALL
GAD7 TOTAL SCORE: 0
7. FEELING AFRAID AS IF SOMETHING AWFUL MIGHT HAPPEN: NOT AT ALL
8. IF YOU CHECKED OFF ANY PROBLEMS, HOW DIFFICULT HAVE THESE MADE IT FOR YOU TO DO YOUR WORK, TAKE CARE OF THINGS AT HOME, OR GET ALONG WITH OTHER PEOPLE?: NOT DIFFICULT AT ALL
5. BEING SO RESTLESS THAT IT IS HARD TO SIT STILL: NOT AT ALL
2. NOT BEING ABLE TO STOP OR CONTROL WORRYING: NOT AT ALL
4. TROUBLE RELAXING: NOT AT ALL
7. FEELING AFRAID AS IF SOMETHING AWFUL MIGHT HAPPEN: NOT AT ALL
6. BECOMING EASILY ANNOYED OR IRRITABLE: NOT AT ALL
IF YOU CHECKED OFF ANY PROBLEMS ON THIS QUESTIONNAIRE, HOW DIFFICULT HAVE THESE PROBLEMS MADE IT FOR YOU TO DO YOUR WORK, TAKE CARE OF THINGS AT HOME, OR GET ALONG WITH OTHER PEOPLE: NOT DIFFICULT AT ALL
1. FEELING NERVOUS, ANXIOUS, OR ON EDGE: NOT AT ALL
GAD7 TOTAL SCORE: 0

## 2023-03-28 ENCOUNTER — OFFICE VISIT (OUTPATIENT)
Dept: FAMILY MEDICINE | Facility: CLINIC | Age: 58
End: 2023-03-28
Payer: COMMERCIAL

## 2023-03-28 VITALS
TEMPERATURE: 96.5 F | BODY MASS INDEX: 40.38 KG/M2 | DIASTOLIC BLOOD PRESSURE: 77 MMHG | WEIGHT: 208.4 LBS | SYSTOLIC BLOOD PRESSURE: 133 MMHG | HEART RATE: 84 BPM | OXYGEN SATURATION: 99 %

## 2023-03-28 DIAGNOSIS — G25.81 RESTLESS LEGS SYNDROME (RLS): ICD-10-CM

## 2023-03-28 DIAGNOSIS — F41.9 ANXIETY: ICD-10-CM

## 2023-03-28 DIAGNOSIS — J33.9 NASAL POLYPOSIS: ICD-10-CM

## 2023-03-28 DIAGNOSIS — E66.01 MORBID OBESITY (H): Primary | ICD-10-CM

## 2023-03-28 DIAGNOSIS — F43.23 SITUATIONAL MIXED ANXIETY AND DEPRESSIVE DISORDER: ICD-10-CM

## 2023-03-28 DIAGNOSIS — J31.0 CHRONIC RHINITIS: ICD-10-CM

## 2023-03-28 PROCEDURE — 99213 OFFICE O/P EST LOW 20 MIN: CPT | Performed by: FAMILY MEDICINE

## 2023-03-28 RX ORDER — AZELASTINE 1 MG/ML
1 SPRAY, METERED NASAL 2 TIMES DAILY
Qty: 30 ML | Refills: 3 | Status: SHIPPED | OUTPATIENT
Start: 2023-03-28 | End: 2023-12-18

## 2023-03-28 RX ORDER — ROPINIROLE 0.5 MG/1
1 TABLET, FILM COATED ORAL AT BEDTIME
Qty: 180 TABLET | Refills: 3 | Status: SHIPPED | OUTPATIENT
Start: 2023-03-28 | End: 2024-03-22

## 2023-03-28 RX ORDER — CITALOPRAM HYDROBROMIDE 20 MG/1
TABLET ORAL
Qty: 90 TABLET | Refills: 1 | Status: SHIPPED | OUTPATIENT
Start: 2023-03-28 | End: 2023-10-02

## 2023-03-28 RX ORDER — MULTIPLE VITAMINS W/ MINERALS TAB 9MG-400MCG
1 TAB ORAL DAILY
COMMUNITY

## 2023-03-28 RX ORDER — AZELASTINE HYDROCHLORIDE 137 UG/1
SPRAY, METERED NASAL
Qty: 30 ML | Refills: 0 | Status: CANCELLED | OUTPATIENT
Start: 2023-03-28

## 2023-03-28 ASSESSMENT — PAIN SCALES - GENERAL: PAINLEVEL: NO PAIN (0)

## 2023-03-28 ASSESSMENT — ANXIETY QUESTIONNAIRES: GAD7 TOTAL SCORE: 0

## 2023-03-28 NOTE — PROGRESS NOTES
"  Assessment & Plan     Restless legs syndrome (RLS)  Refilled   Stable   - rOPINIRole (REQUIP) 0.5 MG tablet; Take 2 tablets (1 mg) by mouth At Bedtime    Chronic rhinitis  Refilled   - azelastine (ASTELIN) 0.1 % nasal spray; Spray 1 spray into both nostrils 2 times daily    Nasal polyposis  Stable     Situational mixed anxiety and depressive disorder  Refilled   - citalopram (CELEXA) 20 MG tablet; TAKE 1 TABLET BY MOUTH ONCE DAILY .    Anxiety  As above   - citalopram (CELEXA) 20 MG tablet; TAKE 1 TABLET BY MOUTH ONCE DAILY .    Morbid obesity (H)  Low gerardo diet/ Exercise discussed   Consider wt management Program-Pt declined    BMI:   Estimated body mass index is 40.38 kg/m  as calculated from the following:    Height as of 8/15/22: 1.53 m (5' 0.24\").    Weight as of this encounter: 94.5 kg (208 lb 6.4 oz).   Weight management plan: Discussed healthy diet and exercise guidelines        Mable Cardoso MD  [facility] RADHA Grant is a 57 year old, presenting for the following health issues:  Recheck Medication  No flowsheet data found.  History of Present Illness       Mental Health Follow-up:  Patient presents to follow-up on Anxiety.    Patient's anxiety since last visit has been:  Good  The patient is not having other symptoms associated with anxiety.  Any significant life events: No  Patient is not feeling anxious or having panic attacks.  Patient has no concerns about alcohol or drug use.She consumes 0 sweetened beverage(s) daily.She exercises with enough effort to increase her heart rate 9 or less minutes per day.  She exercises with enough effort to increase her heart rate 3 or less days per week.   She is taking medications regularly.  Today's CANDELARIO-7 Score: 0       Anxiety Follow-Up    How are you doing with your anxiety since your last visit? Improved     Are you having other symptoms that might be associated with anxiety? No    Have you had a significant life event? No     Are " you feeling depressed? No    Do you have any concerns with your use of alcohol or other drugs? No    Social History     Tobacco Use     Smoking status: Former     Packs/day: 0.50     Years: 27.00     Pack years: 13.50     Types: Cigarettes     Quit date: 10/24/2011     Years since quittin.4     Smokeless tobacco: Former     Quit date: 10/24/2011   Substance Use Topics     Alcohol use: Not Currently     Comment: 2 drinks a month     Drug use: No     CANDELARIO-7 SCORE 2022 2022 3/25/2023   Total Score - - -   Total Score 0 (minimal anxiety) 0 (minimal anxiety) 0 (minimal anxiety)   Total Score 0 0 0     PHQ 3/2/2021 2021 2022   PHQ-9 Total Score 0 2 1   Q9: Thoughts of better off dead/self-harm past 2 weeks Not at all Not at all Not at all     Last PHQ-9 2022   1.  Little interest or pleasure in doing things 0   2.  Feeling down, depressed, or hopeless 0   3.  Trouble falling or staying asleep, or sleeping too much 0   4.  Feeling tired or having little energy 0   5.  Poor appetite or overeating 1   6.  Feeling bad about yourself 0   7.  Trouble concentrating 0   8.  Moving slowly or restless 0   Q9: Thoughts of better off dead/self-harm past 2 weeks 0   PHQ-9 Total Score 1   Difficulty at work, home, or with people Not difficult at all     CANDELARIO-7  3/25/2023   1. Feeling nervous, anxious, or on edge 0   2. Not being able to stop or control worrying 0   3. Worrying too much about different things 0   4. Trouble relaxing 0   5. Being so restless that it is hard to sit still 0   6. Becoming easily annoyed or irritable 0   7. Feeling afraid, as if something awful might happen 0   CANDELARIO-7 Total Score 0   If you checked any problems, how difficult have they made it for you to do your work, take care of things at home, or get along with other people? Not difficult at all   Pt wants meds for RLS refilled  Stable   Trying to work on wt loss       Review of Systems   Constitutional, HEENT, cardiovascular,  pulmonary, gi and gu systems are negative, except as otherwise noted.      Objective    /77   Pulse 84   Temp (!) 96.5  F (35.8  C) (Temporal)   Wt 94.5 kg (208 lb 6.4 oz)   LMP 12/28/2015 (Approximate)   SpO2 99%   BMI 40.38 kg/m    Body mass index is 40.38 kg/m .  Physical Exam   GENERAL: healthy, alert and no distress  RESP: lungs clear to auscultation - no rales, rhonchi or wheezes  CV: regular rate and rhythm, normal S1 S2, no S3 or S4, no murmur, click or rub, no peripheral edema and peripheral pulses strong  MS: no gross musculoskeletal defects noted, no edema  PSYCH: mentation appears normal and affect normal/bright    none

## 2023-04-27 ENCOUNTER — MYC MEDICAL ADVICE (OUTPATIENT)
Dept: FAMILY MEDICINE | Facility: CLINIC | Age: 58
End: 2023-04-27
Payer: COMMERCIAL

## 2023-04-27 DIAGNOSIS — I10 BENIGN ESSENTIAL HYPERTENSION: ICD-10-CM

## 2023-04-28 RX ORDER — AMLODIPINE BESYLATE 2.5 MG/1
2.5 TABLET ORAL DAILY
Qty: 90 TABLET | Refills: 3 | Status: SHIPPED | OUTPATIENT
Start: 2023-04-28 | End: 2024-05-20

## 2023-06-25 DIAGNOSIS — E87.6 HYPOKALEMIA: ICD-10-CM

## 2023-06-25 DIAGNOSIS — I10 BENIGN ESSENTIAL HYPERTENSION: ICD-10-CM

## 2023-06-26 RX ORDER — HYDROCHLOROTHIAZIDE 25 MG/1
TABLET ORAL
Qty: 90 TABLET | Refills: 0 | Status: SHIPPED | OUTPATIENT
Start: 2023-06-26 | End: 2023-10-09

## 2023-06-26 RX ORDER — POTASSIUM CHLORIDE 750 MG/1
TABLET, EXTENDED RELEASE ORAL
Qty: 90 TABLET | Refills: 0 | Status: SHIPPED | OUTPATIENT
Start: 2023-06-26 | End: 2023-09-22

## 2023-07-23 DIAGNOSIS — J44.9 CHRONIC OBSTRUCTIVE PULMONARY DISEASE, UNSPECIFIED COPD TYPE (H): ICD-10-CM

## 2023-07-23 DIAGNOSIS — I10 BENIGN ESSENTIAL HYPERTENSION: ICD-10-CM

## 2023-07-24 RX ORDER — MOMETASONE FUROATE AND FORMOTEROL FUMARATE DIHYDRATE 200; 5 UG/1; UG/1
AEROSOL RESPIRATORY (INHALATION)
Qty: 13 G | Refills: 0 | Status: SHIPPED | OUTPATIENT
Start: 2023-07-24 | End: 2023-10-02

## 2023-07-24 RX ORDER — LOSARTAN POTASSIUM 100 MG/1
TABLET ORAL
Qty: 90 TABLET | Refills: 0 | Status: SHIPPED | OUTPATIENT
Start: 2023-07-24 | End: 2023-10-23

## 2023-08-15 ENCOUNTER — ANCILLARY ORDERS (OUTPATIENT)
Dept: MAMMOGRAPHY | Facility: CLINIC | Age: 58
End: 2023-08-15

## 2023-08-15 DIAGNOSIS — Z12.31 VISIT FOR SCREENING MAMMOGRAM: Primary | ICD-10-CM

## 2023-08-20 DIAGNOSIS — I10 BENIGN ESSENTIAL HYPERTENSION: ICD-10-CM

## 2023-08-21 RX ORDER — CARVEDILOL PHOSPHATE 40 MG/1
CAPSULE, EXTENDED RELEASE ORAL
Qty: 90 CAPSULE | Refills: 0 | Status: SHIPPED | OUTPATIENT
Start: 2023-08-21 | End: 2023-10-23

## 2023-09-21 DIAGNOSIS — I10 BENIGN ESSENTIAL HYPERTENSION: ICD-10-CM

## 2023-09-21 RX ORDER — HYDRALAZINE HYDROCHLORIDE 25 MG/1
TABLET, FILM COATED ORAL
Qty: 360 TABLET | Refills: 0 | Status: SHIPPED | OUTPATIENT
Start: 2023-09-21 | End: 2023-10-23

## 2023-09-22 DIAGNOSIS — E87.6 HYPOKALEMIA: ICD-10-CM

## 2023-09-22 RX ORDER — POTASSIUM CHLORIDE 750 MG/1
TABLET, EXTENDED RELEASE ORAL
Qty: 90 TABLET | Refills: 0 | Status: SHIPPED | OUTPATIENT
Start: 2023-09-22 | End: 2023-10-23

## 2023-09-24 ENCOUNTER — HEALTH MAINTENANCE LETTER (OUTPATIENT)
Age: 58
End: 2023-09-24

## 2023-10-01 DIAGNOSIS — F43.23 SITUATIONAL MIXED ANXIETY AND DEPRESSIVE DISORDER: ICD-10-CM

## 2023-10-01 DIAGNOSIS — J33.9 NASAL POLYPOSIS: ICD-10-CM

## 2023-10-01 DIAGNOSIS — F41.9 ANXIETY: ICD-10-CM

## 2023-10-01 DIAGNOSIS — J32.4 CHRONIC PANSINUSITIS: ICD-10-CM

## 2023-10-02 RX ORDER — FLUTICASONE PROPIONATE 50 MCG
2 SPRAY, SUSPENSION (ML) NASAL DAILY
Qty: 48 G | Refills: 0 | Status: SHIPPED | OUTPATIENT
Start: 2023-10-02 | End: 2024-01-22

## 2023-10-02 RX ORDER — CITALOPRAM HYDROBROMIDE 20 MG/1
TABLET ORAL
Qty: 90 TABLET | Refills: 0 | Status: SHIPPED | OUTPATIENT
Start: 2023-10-02 | End: 2023-10-23

## 2023-10-22 ASSESSMENT — ENCOUNTER SYMPTOMS
BREAST MASS: 0
PARESTHESIAS: 0
HEARTBURN: 0
EYE PAIN: 0
ARTHRALGIAS: 0
FEVER: 0
HEMATURIA: 0
HEMATOCHEZIA: 0
FREQUENCY: 1
SORE THROAT: 0
JOINT SWELLING: 0
NAUSEA: 0
DIARRHEA: 0
NERVOUS/ANXIOUS: 0
CONSTIPATION: 0
CHILLS: 0
DIZZINESS: 0
SHORTNESS OF BREATH: 0
COUGH: 0
MYALGIAS: 0
ABDOMINAL PAIN: 0
PALPITATIONS: 0
HEADACHES: 0
DYSURIA: 0

## 2023-10-23 ENCOUNTER — ANCILLARY PROCEDURE (OUTPATIENT)
Dept: MAMMOGRAPHY | Facility: CLINIC | Age: 58
End: 2023-10-23
Payer: COMMERCIAL

## 2023-10-23 ENCOUNTER — OFFICE VISIT (OUTPATIENT)
Dept: FAMILY MEDICINE | Facility: CLINIC | Age: 58
End: 2023-10-23
Payer: COMMERCIAL

## 2023-10-23 VITALS
TEMPERATURE: 97.9 F | WEIGHT: 211.4 LBS | BODY MASS INDEX: 39.91 KG/M2 | DIASTOLIC BLOOD PRESSURE: 86 MMHG | HEIGHT: 61 IN | OXYGEN SATURATION: 97 % | SYSTOLIC BLOOD PRESSURE: 138 MMHG | HEART RATE: 80 BPM

## 2023-10-23 DIAGNOSIS — Z12.31 VISIT FOR SCREENING MAMMOGRAM: ICD-10-CM

## 2023-10-23 DIAGNOSIS — Z23 NEED FOR PROPHYLACTIC VACCINATION AND INOCULATION AGAINST INFLUENZA: ICD-10-CM

## 2023-10-23 DIAGNOSIS — J43.8 OTHER EMPHYSEMA (H): ICD-10-CM

## 2023-10-23 DIAGNOSIS — Z00.00 ROUTINE GENERAL MEDICAL EXAMINATION AT A HEALTH CARE FACILITY: Primary | ICD-10-CM

## 2023-10-23 DIAGNOSIS — F43.23 SITUATIONAL MIXED ANXIETY AND DEPRESSIVE DISORDER: ICD-10-CM

## 2023-10-23 DIAGNOSIS — E66.813 CLASS 3 SEVERE OBESITY DUE TO EXCESS CALORIES WITH SERIOUS COMORBIDITY AND BODY MASS INDEX (BMI) OF 40.0 TO 44.9 IN ADULT (H): ICD-10-CM

## 2023-10-23 DIAGNOSIS — E87.6 HYPOKALEMIA: ICD-10-CM

## 2023-10-23 DIAGNOSIS — E66.01 CLASS 3 SEVERE OBESITY DUE TO EXCESS CALORIES WITH SERIOUS COMORBIDITY AND BODY MASS INDEX (BMI) OF 40.0 TO 44.9 IN ADULT (H): ICD-10-CM

## 2023-10-23 DIAGNOSIS — F41.9 ANXIETY: ICD-10-CM

## 2023-10-23 DIAGNOSIS — I10 BENIGN ESSENTIAL HYPERTENSION: ICD-10-CM

## 2023-10-23 LAB
ALBUMIN UR-MCNC: NEGATIVE MG/DL
ANION GAP SERPL CALCULATED.3IONS-SCNC: 8 MMOL/L (ref 7–15)
APPEARANCE UR: CLEAR
BILIRUB UR QL STRIP: NEGATIVE
BUN SERPL-MCNC: 19.4 MG/DL (ref 8–23)
CALCIUM SERPL-MCNC: 9.3 MG/DL (ref 8.6–10)
CHLORIDE SERPL-SCNC: 103 MMOL/L (ref 98–107)
CHOLEST SERPL-MCNC: 190 MG/DL
COLOR UR AUTO: YELLOW
CREAT SERPL-MCNC: 0.44 MG/DL (ref 0.51–0.95)
DEPRECATED HCO3 PLAS-SCNC: 25 MMOL/L (ref 22–29)
EGFRCR SERPLBLD CKD-EPI 2021: >90 ML/MIN/1.73M2
GLUCOSE SERPL-MCNC: 99 MG/DL (ref 70–99)
GLUCOSE UR STRIP-MCNC: NEGATIVE MG/DL
HDLC SERPL-MCNC: 63 MG/DL
HGB UR QL STRIP: NEGATIVE
KETONES UR STRIP-MCNC: NEGATIVE MG/DL
LDLC SERPL CALC-MCNC: 117 MG/DL
LEUKOCYTE ESTERASE UR QL STRIP: NEGATIVE
NITRATE UR QL: NEGATIVE
NONHDLC SERPL-MCNC: 127 MG/DL
PH UR STRIP: 7 [PH] (ref 5–7)
POTASSIUM SERPL-SCNC: 4 MMOL/L (ref 3.4–5.3)
SODIUM SERPL-SCNC: 136 MMOL/L (ref 135–145)
SP GR UR STRIP: 1.02 (ref 1–1.03)
TRIGL SERPL-MCNC: 49 MG/DL
UROBILINOGEN UR STRIP-ACNC: 0.2 E.U./DL

## 2023-10-23 PROCEDURE — 80061 LIPID PANEL: CPT | Performed by: FAMILY MEDICINE

## 2023-10-23 PROCEDURE — 90472 IMMUNIZATION ADMIN EACH ADD: CPT | Performed by: FAMILY MEDICINE

## 2023-10-23 PROCEDURE — 36415 COLL VENOUS BLD VENIPUNCTURE: CPT | Performed by: FAMILY MEDICINE

## 2023-10-23 PROCEDURE — 90746 HEPB VACCINE 3 DOSE ADULT IM: CPT | Performed by: FAMILY MEDICINE

## 2023-10-23 PROCEDURE — 90480 ADMN SARSCOV2 VAC 1/ONLY CMP: CPT | Performed by: FAMILY MEDICINE

## 2023-10-23 PROCEDURE — 99214 OFFICE O/P EST MOD 30 MIN: CPT | Mod: 25 | Performed by: FAMILY MEDICINE

## 2023-10-23 PROCEDURE — 90682 RIV4 VACC RECOMBINANT DNA IM: CPT | Performed by: FAMILY MEDICINE

## 2023-10-23 PROCEDURE — 90471 IMMUNIZATION ADMIN: CPT | Performed by: FAMILY MEDICINE

## 2023-10-23 PROCEDURE — 96127 BRIEF EMOTIONAL/BEHAV ASSMT: CPT | Performed by: FAMILY MEDICINE

## 2023-10-23 PROCEDURE — 99396 PREV VISIT EST AGE 40-64: CPT | Mod: 25 | Performed by: FAMILY MEDICINE

## 2023-10-23 PROCEDURE — 80048 BASIC METABOLIC PNL TOTAL CA: CPT | Performed by: FAMILY MEDICINE

## 2023-10-23 PROCEDURE — 81003 URINALYSIS AUTO W/O SCOPE: CPT | Performed by: FAMILY MEDICINE

## 2023-10-23 PROCEDURE — 91320 SARSCV2 VAC 30MCG TRS-SUC IM: CPT | Performed by: FAMILY MEDICINE

## 2023-10-23 PROCEDURE — 77067 SCR MAMMO BI INCL CAD: CPT | Mod: TC | Performed by: RADIOLOGY

## 2023-10-23 RX ORDER — CARVEDILOL PHOSPHATE 40 MG/1
CAPSULE, EXTENDED RELEASE ORAL
Qty: 90 CAPSULE | Refills: 3 | Status: SHIPPED | OUTPATIENT
Start: 2023-10-23

## 2023-10-23 RX ORDER — HYDRALAZINE HYDROCHLORIDE 25 MG/1
TABLET, FILM COATED ORAL
Qty: 360 TABLET | Refills: 3 | Status: SHIPPED | OUTPATIENT
Start: 2023-10-23

## 2023-10-23 RX ORDER — POTASSIUM CHLORIDE 750 MG/1
TABLET, EXTENDED RELEASE ORAL
Qty: 90 TABLET | Refills: 3 | Status: SHIPPED | OUTPATIENT
Start: 2023-10-23

## 2023-10-23 RX ORDER — LOSARTAN POTASSIUM 100 MG/1
100 TABLET ORAL DAILY
Qty: 90 TABLET | Refills: 3 | Status: SHIPPED | OUTPATIENT
Start: 2023-10-23

## 2023-10-23 RX ORDER — HYDROCHLOROTHIAZIDE 25 MG/1
25 TABLET ORAL DAILY
Qty: 90 TABLET | Refills: 3 | Status: SHIPPED | OUTPATIENT
Start: 2023-10-23

## 2023-10-23 RX ORDER — CITALOPRAM HYDROBROMIDE 20 MG/1
TABLET ORAL
Qty: 90 TABLET | Refills: 1 | Status: SHIPPED | OUTPATIENT
Start: 2023-10-23 | End: 2024-06-20

## 2023-10-23 ASSESSMENT — ENCOUNTER SYMPTOMS
ABDOMINAL PAIN: 0
PALPITATIONS: 0
PARESTHESIAS: 0
SHORTNESS OF BREATH: 0
DIARRHEA: 0
JOINT SWELLING: 0
HEMATURIA: 0
BREAST MASS: 0
CHILLS: 0
NERVOUS/ANXIOUS: 0
MYALGIAS: 0
EYE PAIN: 0
ARTHRALGIAS: 0
CONSTIPATION: 0
HEADACHES: 0
COUGH: 0
NAUSEA: 0
FREQUENCY: 1
SORE THROAT: 0
HEARTBURN: 0
FEVER: 0
DYSURIA: 0
HEMATOCHEZIA: 0
DIZZINESS: 0

## 2023-10-23 ASSESSMENT — PATIENT HEALTH QUESTIONNAIRE - PHQ9
5. POOR APPETITE OR OVEREATING: NOT AT ALL
SUM OF ALL RESPONSES TO PHQ QUESTIONS 1-9: 0

## 2023-10-23 ASSESSMENT — ANXIETY QUESTIONNAIRES
6. BECOMING EASILY ANNOYED OR IRRITABLE: NOT AT ALL
7. FEELING AFRAID AS IF SOMETHING AWFUL MIGHT HAPPEN: NOT AT ALL
GAD7 TOTAL SCORE: 2
1. FEELING NERVOUS, ANXIOUS, OR ON EDGE: NOT AT ALL
5. BEING SO RESTLESS THAT IT IS HARD TO SIT STILL: NOT AT ALL
2. NOT BEING ABLE TO STOP OR CONTROL WORRYING: SEVERAL DAYS
GAD7 TOTAL SCORE: 2
3. WORRYING TOO MUCH ABOUT DIFFERENT THINGS: SEVERAL DAYS

## 2023-10-23 NOTE — PROGRESS NOTES
SUBJECTIVE:   CC: Juanita is an 57 year old who presents for preventive health visit.       10/23/2023     8:13 AM   Additional Questions   Roomed by farrah       Healthy Habits:     Getting at least 3 servings of Calcium per day:  Yes    Bi-annual eye exam:  Yes    Dental care twice a year:  Yes    Sleep apnea or symptoms of sleep apnea:  None    Diet:  Low salt and Low fat/cholesterol    Frequency of exercise:  2-3 days/week    Duration of exercise:  15-30 minutes    Medication side effects:  Not applicable    Additional concerns today:  Yes      Pt would like to discuss immunizations.           URINARY TRACT SYMPTOMS  Onset: 3 months  Description:   Painful urination (Dysuria): no            Frequency: YES  Blood in urine (Hematuria): no   Delay in urine (Hesitency): no   Intensity: mild  Progression of Symptoms:  same  Accompanying Signs & Symptoms:  Fever/chills: no   Flank pain no   Nausea and vomiting: no   Any vaginal symptoms: none  Abdominal/Pelvic Pain: no   History:   History of frequent UTI's: no   History of kidney stones: no   Sexually Active: YES  Possibility of pregnancy: No  Precipitating factors:   Pt complains of urinating 3-4 times a night.      Drinks Lots of Fluids at Night  Therapies Tried and outcome:           Hypertension Follow-up    Do you check your blood pressure regularly outside of the clinic? Yes   Are you following a low salt diet? Yes  Are your blood pressures ever more than 140 on the top number (systolic) OR more   than 90 on the bottom number (diastolic), for example 140/90? No    Anxiety Follow-Up  How are you doing with your anxiety since your last visit? Improved   Are you having other symptoms that might be associated with anxiety? No  Have you had a significant life event? No   Are you feeling depressed? No  Do you have any concerns with your use of alcohol or other drugs? No    Social History     Tobacco Use    Smoking status: Former     Packs/day: 0.50     Years: 27.00      Additional pack years: 0.00     Total pack years: 13.50     Types: Cigarettes     Quit date: 10/24/2011     Years since quittin.0    Smokeless tobacco: Former     Quit date: 10/24/2011   Vaping Use    Vaping Use: Never used   Substance Use Topics    Alcohol use: Not Currently     Comment: 2 drinks a month    Drug use: No         2022     8:47 AM 3/25/2023     5:44 PM 10/23/2023     8:30 AM   CANDELARIO-7 SCORE   Total Score 0 (minimal anxiety) 0 (minimal anxiety)    Total Score 0 0 2         2021     4:18 PM 2022     3:10 PM 10/23/2023     8:30 AM   PHQ   PHQ-9 Total Score 2 1 0   Q9: Thoughts of better off dead/self-harm past 2 weeks Not at all Not at all Not at all         10/23/2023     8:30 AM   Last PHQ-9   1.  Little interest or pleasure in doing things 0   2.  Feeling down, depressed, or hopeless 0   3.  Trouble falling or staying asleep, or sleeping too much 0   4.  Feeling tired or having little energy 0   5.  Poor appetite or overeating 0   6.  Feeling bad about yourself 0   7.  Trouble concentrating 0   8.  Moving slowly or restless 0   Q9: Thoughts of better off dead/self-harm past 2 weeks 0   PHQ-9 Total Score 0         10/23/2023     8:30 AM   CANDELARIO-7    1. Feeling nervous, anxious, or on edge 0   2. Not being able to stop or control worrying 1   3. Worrying too much about different things 1   4. Trouble relaxing 0   5. Being so restless that it is hard to sit still 0   6. Becoming easily annoyed or irritable 0   7. Feeling afraid, as if something awful might happen 0   CANDELARIO-7 Total Score 2     COPD Follow-Up  Overall, how are your COPD symptoms since your last clinic visit?  Better  How much fatigue or shortness of breath do you have when you are walking?  None  How much shortness of breath do you have when you are resting?  None  How often do you cough? Never  Have you noticed any change in your sputum/phlegm?  No  Have you experienced a recent fever? No  Please describe how far you can  walk without stopping to rest:  1-2 miles  How many flights of stairs are you able to walk up without stopping?  1  Have you had any Emergency Room Visits, Urgent Care Visits, or Hospital Admissions because of your COPD since your last office visit?  No    History   Smoking Status    Former    Packs/day: 0.50    Years: 27.00    Types: Cigarettes    Quit date: 10/24/2011   Smokeless Tobacco    Former    Quit date: 10/24/2011     Lab Results   Component Value Date    FEV1 96 2013    TID9TTN 86 2013     Social History     Tobacco Use    Smoking status: Former     Packs/day: 0.50     Years: 27.00     Additional pack years: 0.00     Total pack years: 13.50     Types: Cigarettes     Quit date: 10/24/2011     Years since quittin.0    Smokeless tobacco: Former     Quit date: 10/24/2011   Substance Use Topics    Alcohol use: Not Currently     Comment: 2 drinks a month             10/22/2023     8:32 AM   Alcohol Use   Prescreen: >3 drinks/day or >7 drinks/week? No     Reviewed orders with patient.  Reviewed health maintenance and updated orders accordingly - Yes  Lab work is in process  Labs reviewed in EPIC  BP Readings from Last 3 Encounters:   10/23/23 138/86   23 133/77   08/15/22 130/80    Wt Readings from Last 3 Encounters:   10/23/23 95.9 kg (211 lb 6.4 oz)   23 94.5 kg (208 lb 6.4 oz)   08/15/22 94.3 kg (208 lb)                  Patient Active Problem List   Diagnosis    Old disruption of anterior cruciate ligament    CARDIOVASCULAR SCREENING; LDL GOAL LESS THAN 130    Advanced directives, counseling/discussion    Situational mixed anxiety and depressive disorder    Adjustment disorder with mixed anxiety and depressed mood    Hypertrophy of breast    Benign essential hypertension    Morbid obesity (H)    Chronic pansinusitis    Nasal polyposis    Other emphysema (H)    Restless legs syndrome (RLS)    Eczema, unspecified type     Past Surgical History:   Procedure Laterality Date     BUNIONECTOMY RT/LT  2008    (L) first toe    BUNIONECTOMY RT/LT  2012    right first toe    ENT SURGERY  10/9/2018    OPTICAL TRACKING SYSTEM ENDOSCOPIC SINUS SURGERY Bilateral 10/09/2018    Procedure: OPTICAL TRACKING SYSTEM ENDOSCOPIC SINUS SURGERY;  Image guided endoscopic sinus surgery endoscopic septoplasty;  Surgeon: Micky Almaraz MD;  Location: [facility] OR    SEPTOPLASTY N/A 10/09/2018    Procedure: SEPTOPLASTY;;  Surgeon: Micky Almaraz MD;  Location: [facility] OR       Social History     Tobacco Use    Smoking status: Former     Packs/day: 0.50     Years: 27.00     Additional pack years: 0.00     Total pack years: 13.50     Types: Cigarettes     Quit date: 10/24/2011     Years since quittin.0    Smokeless tobacco: Former     Quit date: 10/24/2011   Substance Use Topics    Alcohol use: Not Currently     Comment: 2 drinks a month     Family History   Problem Relation Age of Onset    Unknown/Adopted Mother         Vertigo    Coronary Artery Disease Mother     C.A.D. Father     Coronary Artery Disease Father     Hypertension Father     Hyperlipidemia Father     Cancer Maternal Grandmother         ?         Current Outpatient Medications   Medication Sig Dispense Refill    amLODIPine (NORVASC) 2.5 MG tablet Take 1 tablet (2.5 mg) by mouth daily 90 tablet 3    azelastine (ASTELIN) 0.1 % nasal spray Spray 1 spray into both nostrils 2 times daily 30 mL 3    carvedilol ER (COREG CR) 40 MG 24 hr capsule Take 1 capsule by mouth once daily 90 capsule 3    citalopram (CELEXA) 20 MG tablet TAKE 1 TABLET BY MOUTH ONCE DAILY . 90 tablet 1    DULERA 200-5 MCG/ACT inhaler INHALE 2 PUFFS TWICE DAILY 13 g 0    fluticasone (FLONASE) 50 MCG/ACT nasal spray Use 2 spray(s) in each nostril once daily 48 g 0    hydrALAZINE (APRESOLINE) 25 MG tablet TAKE 1 TABLET BY MOUTH 4 TIMES DAILY FOR HIGH BLOOD PRESSURE 360 tablet 3    hydrochlorothiazide (HYDRODIURIL) 25 MG tablet Take 1 tablet (25 mg) by mouth daily Due for  follow up appointment 90 tablet 3    losartan (COZAAR) 100 MG tablet Take 1 tablet (100 mg) by mouth daily 90 tablet 3    MAGNESIUM PO       multivitamin w/minerals (MULTI-VITAMIN) tablet Take 1 tablet by mouth daily      potassium chloride ER (KLOR-CON M) 10 MEQ CR tablet Take 1 tablet by mouth once daily 90 tablet 3    rOPINIRole (REQUIP) 0.5 MG tablet Take 2 tablets (1 mg) by mouth At Bedtime 180 tablet 3    triamcinolone (KENALOG) 0.1 % external cream Apply topically 2 times daily 60 g 1    vitamin D3 (CHOLECALCIFEROL) 50 mcg (2000 units) tablet Take 1 tablet (50 mcg) by mouth daily      albuterol (PROAIR HFA/PROVENTIL HFA/VENTOLIN HFA) 108 (90 Base) MCG/ACT inhaler INHALE 2 PUFFS BY MOUTH EVERY 6 HOURS (Patient not taking: Reported on 10/23/2023) 18 g 3     Allergies   Allergen Reactions    Lisinopril Cough    Seasonal Allergies Unknown     Recent Labs   Lab Test 22  1535 06/10/21  1021 21  1000 20  1529 19  1110 18  1241 18  0859 10/05/17  1719 17  1055   LDL 97  --  109*  --  124*  --  124*  --  115*   HDL 74  --  73  --  78  --  62  --  80   TRIG 127  --  43  --  64  --  61  --  71   ALT  --   --  36  --   --   --  30  --   --    CR 0.52  --  0.55 0.47*  --   --  0.45*  --  0.48*   GFRESTIMATED >90  --  >90 >90  --   --  >90  --  >90  Non  GFR Calc     GFRESTBLACK  --   --  >90 >90  --   --  >90  --  >90  African American GFR Calc     POTASSIUM 4.3 4.2 3.8 3.9  --    < > 4.0  --  3.9   TSH  --   --   --   --   --   --   --  2.90 1.84    < > = values in this interval not displayed.        Breast Cancer Screenin/8/2022     2:18 PM   Breast CA Risk Assessment (FHS-7)   Do you have a family history of breast, colon, or ovarian cancer? No / Unknown         Pt has mammogram scheduled  Pertinent mammograms are reviewed under the imaging tab.    History of abnormal Pap smear: NO - age 30-65 PAP every 5 years with negative HPV co-testing  recommended      Latest Ref Rng & Units 8/15/2022    10:18 AM 2/24/2017    10:40 AM 12/1/2014    12:00 AM   PAP / HPV   PAP  Negative for Intraepithelial Lesion or Malignancy (NILM)      PAP (Historical)   NIL  NIL    HPV 16 DNA Negative Negative  Negative     HPV 18 DNA Negative Negative  Negative     Other HR HPV Negative Negative  Negative       Reviewed and updated as needed this visit by clinical staff   Tobacco  Allergies  Meds                Hypertension Follow-up    Do you check your blood pressure regularly outside of the clinic? Yes   Are you following a low salt diet? Yes  Are your blood pressures ever more than 140 on the top number (systolic) OR more   than 90 on the bottom number (diastolic), for example 140/90? Yes      COPD Follow-Up  Overall, how are your COPD symptoms since your last clinic visit?  No change  How much fatigue or shortness of breath do you have when you are walking?  None  How much shortness of breath do you have when you are resting?  None  How often do you cough? Never  Have you noticed any change in your sputum/phlegm?  No  Have you experienced a recent fever? No  Please describe how far you can walk without stopping to rest:  1-2 miles  How many flights of stairs are you able to walk up without stopping?  1  Have you had any Emergency Room Visits, Urgent Care Visits, or Hospital Admissions because of your COPD since your last office visit?  No    History   Smoking Status    Former    Packs/day: 0.50    Years: 27.00    Types: Cigarettes    Quit date: 10/24/2011   Smokeless Tobacco    Former    Quit date: 10/24/2011     Lab Results   Component Value Date    FEV1 96 03/08/2013    CVX0LVB 86 03/08/2013     How many servings of fruits and vegetables do you eat daily?  4 or more  On average, how many sweetened beverages do you drink each day (Examples: soda, juice, sweet tea, etc.  Do NOT count diet or artificially sweetened beverages)?   0  How many days per week do you exercise  enough to make your heart beat faster? 3 or less  How many minutes a day do you exercise enough to make your heart beat faster? 30 - 60  How many days per week do you miss taking your medication? 0    Reviewed and updated as needed this visit by Provider                 Past Medical History:   Diagnosis Date    ACL tear 12/09    left, improved with physical therapy    COPD (chronic obstructive pulmonary disease) (H) 3/29/2012    resolved after she quit smoking    HTN (hypertension)       Past Surgical History:   Procedure Laterality Date    BUNIONECTOMY RT/LT  08/07/2008    (L) first toe    BUNIONECTOMY RT/LT  09/20/2012    right first toe    ENT SURGERY  10/9/2018    OPTICAL TRACKING SYSTEM ENDOSCOPIC SINUS SURGERY Bilateral 10/09/2018    Procedure: OPTICAL TRACKING SYSTEM ENDOSCOPIC SINUS SURGERY;  Image guided endoscopic sinus surgery endoscopic septoplasty;  Surgeon: Micky Almaraz MD;  Location: [facility]    SEPTOPLASTY N/A 10/09/2018    Procedure: SEPTOPLASTY;;  Surgeon: Micky Almaraz MD;  Location: [facility] OR       Review of Systems   Constitutional:  Negative for chills and fever.   HENT:  Negative for congestion, ear pain, hearing loss and sore throat.    Eyes:  Negative for pain and visual disturbance.   Respiratory:  Negative for cough and shortness of breath.    Cardiovascular:  Negative for chest pain, palpitations and peripheral edema.   Gastrointestinal:  Negative for abdominal pain, constipation, diarrhea, heartburn, hematochezia and nausea.   Breasts:  Negative for tenderness, breast mass and discharge.   Genitourinary:  Positive for frequency. Negative for dysuria, genital sores, hematuria, pelvic pain, urgency, vaginal bleeding and vaginal discharge.   Musculoskeletal:  Negative for arthralgias, joint swelling and myalgias.   Skin:  Negative for rash.   Neurological:  Negative for dizziness, headaches and paresthesias.   Psychiatric/Behavioral:  Negative for mood changes. The patient is not  "nervous/anxious.           OBJECTIVE:   /86   Pulse 80   Temp 97.9  F (36.6  C) (Oral)   Ht 1.537 m (5' 0.5\")   Wt 95.9 kg (211 lb 6.4 oz)   LMP 12/28/2015 (Approximate)   SpO2 97%   BMI 40.61 kg/m    Physical Exam  GENERAL APPEARANCE: healthy, alert and no distress  EYES: Eyes grossly normal to inspection, PERRL and conjunctivae and sclerae normal  HENT: ear canals and TM's normal, nose and mouth without ulcers or lesions, oropharynx clear and oral mucous membranes moist  NECK: no adenopathy, no asymmetry, masses, or scars and thyroid normal to palpation  RESP: lungs clear to auscultation - no rales, rhonchi or wheezes  BREAST: normal without masses, tenderness or nipple discharge and no palpable axillary masses or adenopathy  CV: regular rate and rhythm, normal S1 S2, no S3 or S4, no murmur, click or rub, no peripheral edema and peripheral pulses strong  ABDOMEN: soft, nontender, no hepatosplenomegaly, no masses and bowel sounds normal  MS: no musculoskeletal defects are noted and gait is age appropriate without ataxia  SKIN: no suspicious lesions or rashes  NEURO: Normal strength and tone, sensory exam grossly normal, mentation intact and speech normal  PSYCH: mentation appears normal and affect normal/bright    Diagnostic Test Results:  Labs reviewed in Epic  Pending     ASSESSMENT/PLAN:   (Z00.00) Routine general medical examination at a health care facility  (primary encounter diagnosis)  Comment:   Plan: Lipid panel reflex to direct LDL Fasting, UA         Macroscopic with reflex to Microscopic and         Culture - Lab Collect            (E66.01,  Z68.41) Class 3 severe obesity due to excess calories with serious comorbidity and body mass index (BMI) of 40.0 to 44.9 in adult (H)  Comment: Losing wt will help wih Hypertension      (J43.8) Other emphysema (H)  Comment: stable   Plan:     (F43.23) Situational mixed anxiety and depressive disorder  Comment: refilled   Plan: citalopram (CELEXA) 20 MG " "tablet        Doing well     (I10) Benign essential hypertension  Comment: stable  Plan: BASIC METABOLIC PANEL, losartan (COZAAR) 100 MG        tablet, hydrochlorothiazide (HYDRODIURIL) 25 MG        tablet, hydrALAZINE (APRESOLINE) 25 MG tablet,         carvedilol ER (COREG CR) 40 MG 24 hr capsule            (E87.6) Hypokalemia  Comment: pending   Plan: potassium chloride ER (KLOR-CON M) 10 MEQ CR         tablet          (Z23) Need for prophylactic vaccination and inoculation against influenza  Comment:   Plan: advised           COUNSELING:  Reviewed preventive health counseling, as reflected in patient instructions       Regular exercise       Healthy diet/nutrition       Vision screening       Osteoporosis prevention/bone health       Colorectal Cancer Screening       The 10-year ASCVD risk score (Shelly WOODARD, et al., 2019) is: 3.7%    Values used to calculate the score:      Age: 57 years      Sex: Female      Is Non- : No      Diabetic: No      Tobacco smoker: No      Systolic Blood Pressure: 138 mmHg      Is BP treated: Yes      HDL Cholesterol: 74 mg/dL      Total Cholesterol: 196 mg/dL       Advance Care Planning      BMI:   Estimated body mass index is 40.61 kg/m  as calculated from the following:    Height as of this encounter: 1.537 m (5' 0.5\").    Weight as of this encounter: 95.9 kg (211 lb 6.4 oz).   Weight management plan: Discussed healthy diet and exercise guidelines      She reports that she quit smoking about 12 years ago. Her smoking use included cigarettes. She has a 13.50 pack-year smoking history. She quit smokeless tobacco use about 12 years ago.        Mable Cardoso MD  [facility]  "

## 2023-11-26 DIAGNOSIS — J44.9 CHRONIC OBSTRUCTIVE PULMONARY DISEASE, UNSPECIFIED COPD TYPE (H): ICD-10-CM

## 2023-11-27 RX ORDER — MOMETASONE FUROATE AND FORMOTEROL FUMARATE DIHYDRATE 200; 5 UG/1; UG/1
AEROSOL RESPIRATORY (INHALATION)
Qty: 13 G | Refills: 0 | Status: SHIPPED | OUTPATIENT
Start: 2023-11-27 | End: 2024-01-03

## 2023-12-18 DIAGNOSIS — J31.0 CHRONIC RHINITIS: ICD-10-CM

## 2023-12-18 RX ORDER — AZELASTINE HYDROCHLORIDE 137 UG/1
SPRAY, METERED NASAL
Qty: 30 ML | Refills: 0 | Status: SHIPPED | OUTPATIENT
Start: 2023-12-18 | End: 2024-02-19

## 2024-01-22 DIAGNOSIS — J32.4 CHRONIC PANSINUSITIS: ICD-10-CM

## 2024-01-22 DIAGNOSIS — J33.9 NASAL POLYPOSIS: ICD-10-CM

## 2024-01-22 RX ORDER — FLUTICASONE PROPIONATE 50 MCG
2 SPRAY, SUSPENSION (ML) NASAL DAILY
Qty: 48 G | Refills: 1 | Status: SHIPPED | OUTPATIENT
Start: 2024-01-22

## 2024-02-18 DIAGNOSIS — J31.0 CHRONIC RHINITIS: ICD-10-CM

## 2024-02-18 DIAGNOSIS — J44.9 CHRONIC OBSTRUCTIVE PULMONARY DISEASE, UNSPECIFIED COPD TYPE (H): ICD-10-CM

## 2024-02-19 RX ORDER — AZELASTINE HYDROCHLORIDE 137 UG/1
SPRAY, METERED NASAL
Qty: 30 ML | Refills: 0 | Status: SHIPPED | OUTPATIENT
Start: 2024-02-19 | End: 2024-04-08

## 2024-02-19 RX ORDER — MOMETASONE FUROATE AND FORMOTEROL FUMARATE DIHYDRATE 200; 5 UG/1; UG/1
AEROSOL RESPIRATORY (INHALATION)
Qty: 13 G | Refills: 0 | Status: SHIPPED | OUTPATIENT
Start: 2024-02-19 | End: 2024-04-08

## 2024-03-22 DIAGNOSIS — G25.81 RESTLESS LEGS SYNDROME (RLS): ICD-10-CM

## 2024-03-22 RX ORDER — ROPINIROLE 0.5 MG/1
1 TABLET, FILM COATED ORAL AT BEDTIME
Qty: 180 TABLET | Refills: 2 | Status: SHIPPED | OUTPATIENT
Start: 2024-03-22

## 2024-04-07 DIAGNOSIS — J31.0 CHRONIC RHINITIS: ICD-10-CM

## 2024-04-07 DIAGNOSIS — J44.9 CHRONIC OBSTRUCTIVE PULMONARY DISEASE, UNSPECIFIED COPD TYPE (H): ICD-10-CM

## 2024-04-08 RX ORDER — AZELASTINE HYDROCHLORIDE 137 UG/1
SPRAY, METERED NASAL
Qty: 30 ML | Refills: 0 | Status: SHIPPED | OUTPATIENT
Start: 2024-04-08 | End: 2024-07-01

## 2024-04-08 RX ORDER — MOMETASONE FUROATE AND FORMOTEROL FUMARATE DIHYDRATE 200; 5 UG/1; UG/1
AEROSOL RESPIRATORY (INHALATION)
Qty: 13 G | Refills: 4 | Status: SHIPPED | OUTPATIENT
Start: 2024-04-08

## 2024-05-18 DIAGNOSIS — I10 BENIGN ESSENTIAL HYPERTENSION: ICD-10-CM

## 2024-05-20 RX ORDER — AMLODIPINE BESYLATE 2.5 MG/1
2.5 TABLET ORAL DAILY
Qty: 90 TABLET | Refills: 0 | Status: SHIPPED | OUTPATIENT
Start: 2024-05-20 | End: 2024-08-12

## 2024-06-16 DIAGNOSIS — F43.23 SITUATIONAL MIXED ANXIETY AND DEPRESSIVE DISORDER: ICD-10-CM

## 2024-06-16 DIAGNOSIS — F41.9 ANXIETY: ICD-10-CM

## 2024-06-20 RX ORDER — CITALOPRAM HYDROBROMIDE 20 MG/1
TABLET ORAL
Qty: 90 TABLET | Refills: 0 | OUTPATIENT
Start: 2024-06-20

## 2024-06-20 RX ORDER — CITALOPRAM HYDROBROMIDE 20 MG/1
TABLET ORAL
Qty: 90 TABLET | Refills: 0 | Status: SHIPPED | OUTPATIENT
Start: 2024-06-20 | End: 2024-09-22

## 2024-06-20 NOTE — TELEPHONE ENCOUNTER
Pt completed PHQ9 and GAD7        6/20/2024     1:49 PM   PHQ   PHQ-9 Total Score 0   Q9: Thoughts of better off dead/self-harm past 2 weeks Not at all         3/25/2023     5:44 PM 10/23/2023     8:30 AM 6/20/2024     1:50 PM   CANDELARIO-7 SCORE   Total Score 0 (minimal anxiety)  0 (minimal anxiety)   Total Score 0 2 0       Nicki Genao MA

## 2024-06-20 NOTE — TELEPHONE ENCOUNTER
Patient called back, [facility]     Ruby Fair RN

## 2024-06-30 DIAGNOSIS — J31.0 CHRONIC RHINITIS: ICD-10-CM

## 2024-07-01 RX ORDER — AZELASTINE HYDROCHLORIDE 137 UG/1
SPRAY, METERED NASAL
Qty: 30 ML | Refills: 0 | Status: SHIPPED | OUTPATIENT
Start: 2024-07-01 | End: 2024-08-13

## 2024-08-11 DIAGNOSIS — I10 BENIGN ESSENTIAL HYPERTENSION: ICD-10-CM

## 2024-08-12 RX ORDER — AMLODIPINE BESYLATE 2.5 MG/1
2.5 TABLET ORAL DAILY
Qty: 90 TABLET | Refills: 0 | Status: SHIPPED | OUTPATIENT
Start: 2024-08-12

## 2024-08-13 DIAGNOSIS — J31.0 CHRONIC RHINITIS: ICD-10-CM

## 2024-08-13 RX ORDER — AZELASTINE HYDROCHLORIDE 137 UG/1
SPRAY, METERED NASAL
Qty: 30 ML | Refills: 0 | Status: SHIPPED | OUTPATIENT
Start: 2024-08-13

## 2024-09-22 ENCOUNTER — MYC REFILL (OUTPATIENT)
Dept: FAMILY MEDICINE | Facility: CLINIC | Age: 59
End: 2024-09-22
Payer: COMMERCIAL

## 2024-09-22 DIAGNOSIS — F43.23 SITUATIONAL MIXED ANXIETY AND DEPRESSIVE DISORDER: ICD-10-CM

## 2024-09-22 DIAGNOSIS — F41.9 ANXIETY: ICD-10-CM

## 2024-09-23 ENCOUNTER — ANCILLARY ORDERS (OUTPATIENT)
Dept: MAMMOGRAPHY | Facility: CLINIC | Age: 59
End: 2024-09-23

## 2024-09-23 DIAGNOSIS — Z12.31 VISIT FOR SCREENING MAMMOGRAM: Primary | ICD-10-CM

## 2024-09-23 RX ORDER — CITALOPRAM HYDROBROMIDE 20 MG/1
TABLET ORAL
Qty: 90 TABLET | Refills: 0 | Status: SHIPPED | OUTPATIENT
Start: 2024-09-23

## 2024-10-19 DIAGNOSIS — J31.0 CHRONIC RHINITIS: ICD-10-CM

## 2024-10-21 RX ORDER — AZELASTINE HYDROCHLORIDE 137 UG/1
SPRAY, METERED NASAL
Qty: 30 ML | Refills: 0 | Status: SHIPPED | OUTPATIENT
Start: 2024-10-21

## 2024-11-08 SDOH — HEALTH STABILITY: PHYSICAL HEALTH: ON AVERAGE, HOW MANY DAYS PER WEEK DO YOU ENGAGE IN MODERATE TO STRENUOUS EXERCISE (LIKE A BRISK WALK)?: 2 DAYS

## 2024-11-08 SDOH — HEALTH STABILITY: PHYSICAL HEALTH: ON AVERAGE, HOW MANY MINUTES DO YOU ENGAGE IN EXERCISE AT THIS LEVEL?: 30 MIN

## 2024-11-08 ASSESSMENT — SOCIAL DETERMINANTS OF HEALTH (SDOH): HOW OFTEN DO YOU GET TOGETHER WITH FRIENDS OR RELATIVES?: ONCE A WEEK

## 2024-12-05 SDOH — HEALTH STABILITY: PHYSICAL HEALTH: ON AVERAGE, HOW MANY MINUTES DO YOU ENGAGE IN EXERCISE AT THIS LEVEL?: 30 MIN

## 2024-12-05 SDOH — HEALTH STABILITY: PHYSICAL HEALTH: ON AVERAGE, HOW MANY DAYS PER WEEK DO YOU ENGAGE IN MODERATE TO STRENUOUS EXERCISE (LIKE A BRISK WALK)?: 2 DAYS

## 2024-12-05 ASSESSMENT — SOCIAL DETERMINANTS OF HEALTH (SDOH): HOW OFTEN DO YOU GET TOGETHER WITH FRIENDS OR RELATIVES?: ONCE A WEEK

## 2024-12-09 ENCOUNTER — ANCILLARY PROCEDURE (OUTPATIENT)
Dept: MAMMOGRAPHY | Facility: CLINIC | Age: 59
End: 2024-12-09
Payer: COMMERCIAL

## 2024-12-09 ENCOUNTER — OFFICE VISIT (OUTPATIENT)
Dept: FAMILY MEDICINE | Facility: CLINIC | Age: 59
End: 2024-12-09
Attending: FAMILY MEDICINE
Payer: COMMERCIAL

## 2024-12-09 VITALS
HEART RATE: 79 BPM | DIASTOLIC BLOOD PRESSURE: 78 MMHG | OXYGEN SATURATION: 97 % | WEIGHT: 221 LBS | TEMPERATURE: 97.8 F | BODY MASS INDEX: 43.39 KG/M2 | SYSTOLIC BLOOD PRESSURE: 144 MMHG | HEIGHT: 60 IN | RESPIRATION RATE: 18 BRPM

## 2024-12-09 DIAGNOSIS — J44.9 CHRONIC OBSTRUCTIVE PULMONARY DISEASE, UNSPECIFIED COPD TYPE (H): ICD-10-CM

## 2024-12-09 DIAGNOSIS — F43.23 ADJUSTMENT DISORDER WITH MIXED ANXIETY AND DEPRESSED MOOD: ICD-10-CM

## 2024-12-09 DIAGNOSIS — G25.81 RESTLESS LEGS SYNDROME (RLS): ICD-10-CM

## 2024-12-09 DIAGNOSIS — J43.8 OTHER EMPHYSEMA (H): ICD-10-CM

## 2024-12-09 DIAGNOSIS — I10 BENIGN ESSENTIAL HYPERTENSION: ICD-10-CM

## 2024-12-09 DIAGNOSIS — Z12.31 VISIT FOR SCREENING MAMMOGRAM: ICD-10-CM

## 2024-12-09 DIAGNOSIS — E87.6 HYPOKALEMIA: ICD-10-CM

## 2024-12-09 DIAGNOSIS — F43.23 SITUATIONAL MIXED ANXIETY AND DEPRESSIVE DISORDER: ICD-10-CM

## 2024-12-09 DIAGNOSIS — Z00.00 ROUTINE GENERAL MEDICAL EXAMINATION AT A HEALTH CARE FACILITY: ICD-10-CM

## 2024-12-09 DIAGNOSIS — E66.01 CLASS 3 SEVERE OBESITY DUE TO EXCESS CALORIES WITH SERIOUS COMORBIDITY AND BODY MASS INDEX (BMI) OF 40.0 TO 44.9 IN ADULT (H): ICD-10-CM

## 2024-12-09 DIAGNOSIS — E66.813 CLASS 3 SEVERE OBESITY DUE TO EXCESS CALORIES WITH SERIOUS COMORBIDITY AND BODY MASS INDEX (BMI) OF 40.0 TO 44.9 IN ADULT (H): ICD-10-CM

## 2024-12-09 PROCEDURE — 99214 OFFICE O/P EST MOD 30 MIN: CPT | Mod: 25 | Performed by: FAMILY MEDICINE

## 2024-12-09 PROCEDURE — 36415 COLL VENOUS BLD VENIPUNCTURE: CPT | Performed by: FAMILY MEDICINE

## 2024-12-09 PROCEDURE — 77063 BREAST TOMOSYNTHESIS BI: CPT | Mod: TC | Performed by: RADIOLOGY

## 2024-12-09 PROCEDURE — 80053 COMPREHEN METABOLIC PANEL: CPT | Performed by: FAMILY MEDICINE

## 2024-12-09 PROCEDURE — 80061 LIPID PANEL: CPT | Performed by: FAMILY MEDICINE

## 2024-12-09 PROCEDURE — 99396 PREV VISIT EST AGE 40-64: CPT | Performed by: FAMILY MEDICINE

## 2024-12-09 PROCEDURE — 77067 SCR MAMMO BI INCL CAD: CPT | Mod: TC | Performed by: RADIOLOGY

## 2024-12-09 RX ORDER — POTASSIUM CHLORIDE 750 MG/1
TABLET, EXTENDED RELEASE ORAL
Qty: 90 TABLET | Refills: 3 | Status: SHIPPED | OUTPATIENT
Start: 2024-12-09

## 2024-12-09 RX ORDER — ROPINIROLE 0.5 MG/1
1 TABLET, FILM COATED ORAL AT BEDTIME
Qty: 180 TABLET | Refills: 3 | Status: SHIPPED | OUTPATIENT
Start: 2024-12-09

## 2024-12-09 RX ORDER — CITALOPRAM HYDROBROMIDE 20 MG/1
TABLET ORAL
Qty: 90 TABLET | Refills: 0 | Status: SHIPPED | OUTPATIENT
Start: 2024-12-09

## 2024-12-09 RX ORDER — UBIDECARENONE 100 MG
100 CAPSULE ORAL DAILY
COMMUNITY

## 2024-12-09 RX ORDER — HYDRALAZINE HYDROCHLORIDE 25 MG/1
TABLET, FILM COATED ORAL
Qty: 360 TABLET | Refills: 0 | Status: SHIPPED | OUTPATIENT
Start: 2024-12-09

## 2024-12-09 RX ORDER — ALBUTEROL SULFATE 90 UG/1
INHALANT RESPIRATORY (INHALATION)
Qty: 18 G | Refills: 3 | Status: SHIPPED | OUTPATIENT
Start: 2024-12-09

## 2024-12-09 RX ORDER — HYDROCHLOROTHIAZIDE 25 MG/1
12.5 TABLET ORAL DAILY
Qty: 30 TABLET | Refills: 0 | Status: SHIPPED | OUTPATIENT
Start: 2024-12-09

## 2024-12-09 RX ORDER — LOSARTAN POTASSIUM 100 MG/1
100 TABLET ORAL DAILY
Qty: 90 TABLET | Refills: 3 | Status: SHIPPED | OUTPATIENT
Start: 2024-12-09

## 2024-12-09 RX ORDER — CARVEDILOL PHOSPHATE 40 MG/1
CAPSULE, EXTENDED RELEASE ORAL
Qty: 90 CAPSULE | Refills: 0 | Status: SHIPPED | OUTPATIENT
Start: 2024-12-09

## 2024-12-09 RX ORDER — AMLODIPINE BESYLATE 2.5 MG/1
2.5 TABLET ORAL DAILY
Qty: 90 TABLET | Refills: 0 | Status: SHIPPED | OUTPATIENT
Start: 2024-12-09

## 2024-12-09 RX ORDER — MAGNESIUM 200 MG
TABLET ORAL DAILY
COMMUNITY

## 2024-12-09 ASSESSMENT — ANXIETY QUESTIONNAIRES
GAD7 TOTAL SCORE: 2
GAD7 TOTAL SCORE: 2
3. WORRYING TOO MUCH ABOUT DIFFERENT THINGS: SEVERAL DAYS
2. NOT BEING ABLE TO STOP OR CONTROL WORRYING: SEVERAL DAYS
5. BEING SO RESTLESS THAT IT IS HARD TO SIT STILL: NOT AT ALL
1. FEELING NERVOUS, ANXIOUS, OR ON EDGE: NOT AT ALL
7. FEELING AFRAID AS IF SOMETHING AWFUL MIGHT HAPPEN: NOT AT ALL
6. BECOMING EASILY ANNOYED OR IRRITABLE: NOT AT ALL

## 2024-12-09 ASSESSMENT — PATIENT HEALTH QUESTIONNAIRE - PHQ9
5. POOR APPETITE OR OVEREATING: NOT AT ALL
SUM OF ALL RESPONSES TO PHQ QUESTIONS 1-9: 2

## 2024-12-09 NOTE — PROGRESS NOTES
Preventive Care Visit  [facility] RADHA Cardoso MD, Family Medicine  Dec 9, 2024      Assessment & Plan     Routine general medical examination at a health care facility  Pending   - Lipid panel reflex to direct LDL Fasting; Future  - Comprehensive metabolic panel (BMP + Alb, Alk Phos, ALT, AST, Total. Bili, TP); Future  - Lipid panel reflex to direct LDL Fasting  - Comprehensive metabolic panel (BMP + Alb, Alk Phos, ALT, AST, Total. Bili, TP)    Benign essential hypertension  Advised take meds as recommended   Follow up 3-4 weeks BP check  DASH diet  Discussed needs to lose weight  - hydrochlorothiazide (HYDRODIURIL) 25 MG tablet; Take 0.5 tablets (12.5 mg) by mouth daily.  - hydrALAZINE (APRESOLINE) 25 MG tablet; TAKE 1 TABLET BY MOUTH 4 TIMES DAILY FOR HIGH BLOOD PRESSURE  - carvedilol ER (COREG CR) 40 MG 24 hr capsule; Take 1 capsule by mouth once daily  - amLODIPine (NORVASC) 2.5 MG tablet; Take 1 tablet (2.5 mg) by mouth daily.  - losartan (COZAAR) 100 MG tablet; Take 1 tablet (100 mg) by mouth daily.  - Comprehensive metabolic panel (BMP + Alb, Alk Phos, ALT, AST, Total. Bili, TP); Future  - Comprehensive metabolic panel (BMP + Alb, Alk Phos, ALT, AST, Total. Bili, TP)    Class 3 severe obesity due to excess calories with serious comorbidity and body mass index (BMI) of 40.0 to 44.9 in adult (H)  Low gerardo diet   - semaglutide-weight management (WEGOVY) 0.25 MG/0.5ML pen; Inject 0.5 mLs (0.25 mg) subcutaneously once a week.  Will send PA  Pt has gained wt Losing wt will help decrease BP and decrease her ASCVD risk  Discussed potential side effects of ozempic  Risk of pancreatitis, Thyroid CA etc  Read side effects as Given By pharmacy  If any abdominal pain, nausea , vomiting, lump in necjk stop medicines and see provider   Other emphysema (H)  Stable     Adjustment disorder with mixed anxiety and depressed mood  Doing well on meds    Chronic obstructive pulmonary disease, unspecified COPD  type (H)  Stable   - albuterol (PROAIR HFA/PROVENTIL HFA/VENTOLIN HFA) 108 (90 Base) MCG/ACT inhaler; INHALE 2 PUFFS BY MOUTH EVERY 6 HOURS    Situational mixed anxiety and depressive disorder    - citalopram (CELEXA) 20 MG tablet; TAKE 1 TABLET BY MOUTH ONCE DAILY .    Restless legs syndrome (RLS)  Stable   - rOPINIRole (REQUIP) 0.5 MG tablet; Take 2 tablets (1 mg) by mouth at bedtime.    Hypokalemia  Stable   - potassium chloride marisol ER (KLOR-CON M10) 10 MEQ CR tablet; Take 1 tablet by mouth once daily        BMI  Estimated body mass index is 43.16 kg/m  as calculated from the following:    Height as of this encounter: 1.524 m (5').    Weight as of this encounter: 100.2 kg (221 lb).   Weight management plan: Discussed healthy diet and exercise guidelines    Counseling  Appropriate preventive services were addressed with this patient via screening, questionnaire, or discussion as appropriate for fall prevention, nutrition, physical activity, Tobacco-use cessation, social engagement, weight loss and cognition.  Checklist reviewing preventive services available has been given to the patient.  Reviewed patient's diet, addressing concerns and/or questions.   She is at risk for lack of exercise and has been provided with information to increase physical activity for the benefit of her well-being.     Pt states she has had RSV vaccine  Time spent reviewing chart, addressing her medical Problems as above, ordering labs, refilling meds and discussing her medical Problems, , documenting-Labs will be reviewed when back and will make further recommendations based on her labs  32 inutes  Work on weight loss  Regular exercise  Referral also done for sleep study    Geovany Grant is a 59 year old, presenting for the following:  Physical        12/9/2024    12:27 PM   Additional Questions   Roomed by Radha MATTHEWS      Hand and finger tingling, weight       Hypertension Follow-up    Do you check your blood pressure  regularly outside of the clinic? No   Are you following a low salt diet? No  Are your blood pressures ever more than 140 on the top number (systolic) OR more   than 90 on the bottom number (diastolic), for example 140/90? Yes    Anxiety   How are you doing with your anxiety since your last visit? Anxious as Mother is sick in [place]  Are you having other symptoms that might be associated with anxiety? No  Have you had a significant life event? No   Are you feeling depressed? No  Do you have any concerns with your use of alcohol or other drugs? No    Social History     Tobacco Use    Smoking status: Former     Current packs/day: 0.00     Average packs/day: 0.5 packs/day for 27.0 years (13.5 ttl pk-yrs)     Types: Cigarettes     Start date: 10/24/1984     Quit date: 10/24/2011     Years since quittin.1    Smokeless tobacco: Former     Quit date: 10/24/2011   Vaping Use    Vaping status: Never Used   Substance Use Topics    Alcohol use: Not Currently     Comment: 2 drinks a month    Drug use: No         10/23/2023     8:30 AM 2024     1:50 PM 2024    12:57 PM   CANDELARIO-7 SCORE   Total Score  0 (minimal anxiety)    Total Score 2 0 2         10/23/2023     8:30 AM 2024     1:49 PM 2024    12:57 PM   PHQ   PHQ-9 Total Score 0 0 2   Q9: Thoughts of better off dead/self-harm past 2 weeks Not at all Not at all  Not at all       Patient-reported         2024    12:57 PM   Last PHQ-9   1.  Little interest or pleasure in doing things 0   2.  Feeling down, depressed, or hopeless 0   3.  Trouble falling or staying asleep, or sleeping too much 0   4.  Feeling tired or having little energy 0   5.  Poor appetite or overeating 2   6.  Feeling bad about yourself 0   7.  Trouble concentrating 0   8.  Moving slowly or restless 0   Q9: Thoughts of better off dead/self-harm past 2 weeks 0   PHQ-9 Total Score 2         2024    12:57 PM   CANDELARIO-7    1. Feeling nervous, anxious, or on edge 0   2. Not being able  to stop or control worrying 1   3. Worrying too much about different things 1   4. Trouble relaxing 0   5. Being so restless that it is hard to sit still 0   6. Becoming easily annoyed or irritable 0   7. Feeling afraid, as if something awful might happen 0   CANDELARIO-7 Total Score 2     COPD Follow-Up  Overall, how are your COPD symptoms since your last clinic visit?     How much fatigue or shortness of breath do you have when you are walking?  Same as usual  How much shortness of breath do you have when you are resting?  None  How often do you cough? Never  Have you noticed any change in your sputum/phlegm?  No  Have you experienced a recent fever? No  Please describe how far you can walk without stopping to rest:  1-2 miles    Have you had any Emergency Room Visits, Urgent Care Visits, or Hospital Admissions because of your COPD since your last office visit?  No    History   Smoking Status    Former    Packs/day: 0.50    Years: 27.00    Types: Cigarettes    Quit date: 10/24/2011   Smokeless Tobacco    Former    Quit date: 10/24/2011     Lab Results   Component Value Date    FEV1 96 03/08/2013    [id number] 86 03/08/2013         Health Care Directive  Patient does not have a Health Care Directive: Discussed advance care planning with patient; information given to patient to review.      12/5/2024   General Health   How would you rate your overall physical health? (!) FAIR   Feel stress (tense, anxious, or unable to sleep) Only a little      (!) STRESS CONCERN      12/5/2024   Nutrition   Three or more servings of calcium each day? Yes   Diet: Low salt    Low fat/cholesterol   How many servings of fruit and vegetables per day? (!) 2-3   How many sweetened beverages each day? 0-1       Multiple values from one day are sorted in reverse-chronological order         12/5/2024   Exercise   Days per week of moderate/strenous exercise 2 days   Average minutes spent exercising at this level 30 min      (!) EXERCISE CONCERN       2024   Social Factors   Frequency of gathering with friends or relatives Once a week   Worry food won't last until get money to buy more No   Food not last or not have enough money for food? No   Do you have housing? (Housing is defined as stable permanent housing and does not include staying ouside in a car, in a tent, in an abandoned building, in an overnight shelter, or couch-surfing.) Yes   Are you worried about losing your housing? No   Lack of transportation? No   Unable to get utilities (heat,electricity)? No            2024   Fall Risk   Fallen 2 or more times in the past year? No    Trouble with walking or balance? Yes        Patient-reported           2024   Dental   Dentist two times every year? Yes            2024   TB Screening   Were you born outside of the [place]? Yes            Today's PHQ-2 Score:       11/10/2024    11:25 AM   PHQ-2 (  [employer])   Q1: Little interest or pleasure in doing things 0    Q2: Feeling down, depressed or hopeless 0    PHQ-2 Score 0    Q1: Little interest or pleasure in doing things Not at all   Q2: Feeling down, depressed or hopeless Not at all   PHQ-2 Score 0       Patient-reported           2024   Substance Use   Alcohol more than 3/day or more than 7/wk No   Do you use any other substances recreationally? No        Social History     Tobacco Use    Smoking status: Former     Current packs/day: 0.00     Average packs/day: 0.5 packs/day for 27.0 years (13.5 ttl pk-yrs)     Types: Cigarettes     Start date: 10/24/1984     Quit date: 10/24/2011     Years since quittin.1    Smokeless tobacco: Former     Quit date: 10/24/2011   Vaping Use    Vaping status: Never Used   Substance Use Topics    Alcohol use: Not Currently     Comment: 2 drinks a month    Drug use: No           10/23/2023   LAST FHS-7 RESULTS   1st degree relative breast or ovarian cancer No   Any relative bilateral breast cancer No   Any male have breast cancer No   Any ONE woman have  BOTH breast AND ovarian cancer No   Any woman with breast cancer before 50yrs No   2 or more relatives with breast AND/OR ovarian cancer No   2 or more relatives with breast AND/OR bowel cancer No           Pt is getting her mammogram today        2024   STI Screening   New sexual partner(s) since last STI/HIV test? (!) YES         History of abnormal Pap smear: No - age 30- 64 PAP with HPV every 5 years recommended        Latest Ref Rng & Units 8/15/2022    10:18 AM 2017    10:40 AM 2014    12:00 AM   PAP / HPV   PAP  Negative for Intraepithelial Lesion or Malignancy (NILM)      PAP (Historical)   NIL  NIL    HPV 16 DNA Negative Negative  Negative     HPV 18 DNA Negative Negative  Negative     Other HR HPV Negative Negative  Negative       ASCVD Risk   The ASCVD Risk score (Shelly WOODARD, et al., 2019) failed to calculate for the following reasons:    The systolic blood pressure is missing    Fracture Risk Assessment Tool  Link to Frax Calculator  Use the information below to complete the Frax calculator  : 1965  Sex: female  Weight (kg): 100.2 kg (actual weight)  Height (cm): 152.4 cm  Previous Fragility Fracture:  No  History of parent with fractured hip:  No  Current Smoking:  No  Patient has been on glucocorticoids for more than 3 months (5mg/day or more): No  Rheumatoid Arthritis on Problem List:  No  Secondary Osteoporosis on Problem List:  No  Consumes 3 or more units of alcohol per day: No  Femoral Neck BMD (g/cm2)           Reviewed and updated as needed this visit by Provider                    Past Medical History:   Diagnosis Date    ACL tear     left, improved with physical therapy    COPD (chronic obstructive pulmonary disease) (H) 3/29/2012    resolved after she quit smoking    HTN (hypertension)      Past Surgical History:   Procedure Laterality Date    BUNIONECTOMY RT/LT  2008    (L) first toe    BUNIONECTOMY RT/LT  2012    right first toe    ENT SURGERY   10/9/2018    OPTICAL TRACKING SYSTEM ENDOSCOPIC SINUS SURGERY Bilateral 10/09/2018    Procedure: OPTICAL TRACKING SYSTEM ENDOSCOPIC SINUS SURGERY;  Image guided endoscopic sinus surgery endoscopic septoplasty;  Surgeon: Micky Almaraz MD;  Location: [facility] OR    SEPTOPLASTY N/A 10/09/2018    Procedure: SEPTOPLASTY;;  Surgeon: Micky Almaraz MD;  Location: [facility] OR     OB History   No obstetric history on file.     Lab work is in process  Labs reviewed in EPIC  BP Readings from Last 3 Encounters:   12/09/24 (!) 144/78   10/23/23 138/86   03/28/23 133/77    Wt Readings from Last 3 Encounters:   12/09/24 100.2 kg (221 lb)   10/23/23 95.9 kg (211 lb 6.4 oz)   03/28/23 94.5 kg (208 lb 6.4 oz)                  Patient Active Problem List   Diagnosis    Old disruption of anterior cruciate ligament    CARDIOVASCULAR SCREENING; LDL GOAL LESS THAN 130    Situational mixed anxiety and depressive disorder    Adjustment disorder with mixed anxiety and depressed mood    Hypertrophy of breast    Benign essential hypertension    Morbid obesity (H)    Chronic pansinusitis    Nasal polyposis    Other emphysema (H)    Restless legs syndrome (RLS)    Eczema, unspecified type     Past Surgical History:   Procedure Laterality Date    BUNIONECTOMY RT/LT  08/07/2008    (L) first toe    BUNIONECTOMY RT/LT  09/20/2012    right first toe    ENT SURGERY  10/9/2018    OPTICAL TRACKING SYSTEM ENDOSCOPIC SINUS SURGERY Bilateral 10/09/2018    Procedure: OPTICAL TRACKING SYSTEM ENDOSCOPIC SINUS SURGERY;  Image guided endoscopic sinus surgery endoscopic septoplasty;  Surgeon: Micky Almaraz MD;  Location: [facility] OR    SEPTOPLASTY N/A 10/09/2018    Procedure: SEPTOPLASTY;;  Surgeon: Micky Almaraz MD;  Location: [facility] OR       Social History     Tobacco Use    Smoking status: Former     Current packs/day: 0.00     Average packs/day: 0.5 packs/day for 27.0 years (13.5 ttl pk-yrs)     Types: Cigarettes     Start date: 10/24/1984     Quit  date: 10/24/2011     Years since quittin.1    Smokeless tobacco: Former     Quit date: 10/24/2011   Substance Use Topics    Alcohol use: Not Currently     Comment: 2 drinks a month     Family History   Problem Relation Age of Onset    Unknown/Adopted Mother         Vertigo    Coronary Artery Disease Mother     C.A.D. Father     Coronary Artery Disease Father     Hypertension Father     Hyperlipidemia Father     Cancer Maternal Grandmother         ?         Current Outpatient Medications   Medication Sig Dispense Refill    albuterol (PROAIR HFA/PROVENTIL HFA/VENTOLIN HFA) 108 (90 Base) MCG/ACT inhaler INHALE 2 PUFFS BY MOUTH EVERY 6 HOURS 18 g 3    amLODIPine (NORVASC) 2.5 MG tablet Take 1 tablet (2.5 mg) by mouth daily. 90 tablet 0    azelastine 137 MCG/SPRAY SOLN Use 1 spray(s) in each nostril twice daily 30 mL 0    carvedilol ER (COREG CR) 40 MG 24 hr capsule Take 1 capsule by mouth once daily 90 capsule 0    citalopram (CELEXA) 20 MG tablet TAKE 1 TABLET BY MOUTH ONCE DAILY . 90 tablet 0    co-enzyme Q-10 100 MG CAPS capsule Take 100 mg by mouth daily.      cyanocobalamin 1000 MCG sublingual tablet Place under the tongue daily.      fluticasone (FLONASE) 50 MCG/ACT nasal spray Use 2 spray(s) in each nostril once daily 48 g 0    hydrALAZINE (APRESOLINE) 25 MG tablet TAKE 1 TABLET BY MOUTH 4 TIMES DAILY FOR HIGH BLOOD PRESSURE 360 tablet 0    hydrochlorothiazide (HYDRODIURIL) 25 MG tablet Take 0.5 tablets (12.5 mg) by mouth daily. 30 tablet 0    losartan (COZAAR) 100 MG tablet Take 1 tablet (100 mg) by mouth daily. 90 tablet 3    MAGNESIUM PO       mometasone-formoterol (DULERA) 200-5 MCG/ACT inhaler Inhale 2 puffs by mouth twice daily 13 g 4    multivitamin w/minerals (MULTI-VITAMIN) tablet Take 1 tablet by mouth daily      potassium chloride marisol ER (KLOR-CON M10) 10 MEQ CR tablet Take 1 tablet by mouth once daily 90 tablet 3    rOPINIRole (REQUIP) 0.5 MG tablet Take 2 tablets (1 mg) by mouth at bedtime. 180  tablet 3    semaglutide-weight management (WEGOVY) 0.25 MG/0.5ML pen Inject 0.5 mLs (0.25 mg) subcutaneously once a week. 2 mL 0    triamcinolone (KENALOG) 0.1 % external cream Apply topically 2 times daily 60 g 1    vitamin D3 (CHOLECALCIFEROL) 50 mcg (2000 units) tablet Take 1 tablet (50 mcg) by mouth daily       Allergies   Allergen Reactions    Lisinopril Cough    Seasonal Allergies Unknown     Recent Labs   Lab Test 10/23/23  0903 07/06/22  1535 06/10/21  1021 03/02/21  1000 02/18/20  1529 08/29/18  1241 03/23/18  0859 10/05/17  1719 02/24/17  1055   * 97  --  109*  --    < > 124*  --  115*   HDL 63 74  --  73  --    < > 62  --  80   TRIG 49 127  --  43  --    < > 61  --  71   ALT  --   --   --  36  --   --  30  --   --    CR 0.44* 0.52  --  0.55 0.47*  --  0.45*  --  0.48*   GFRESTIMATED >90 >90  --  >90 >90  --  >90  --  >90  Non  GFR Calc     GFRESTBLACK  --   --   --  >90 >90  --  >90  --  >90  African American GFR Calc     POTASSIUM 4.0 4.3   < > 3.8 3.9   < > 4.0  --  3.9   TSH  --   --   --   --   --   --   --  2.90 1.84    < > = values in this interval not displayed.          Review of Systems  CONSTITUTIONAL: NEGATIVE for fever, chills, change in weight  INTEGUMENTARY/SKIN: NEGATIVE for worrisome rashes, moles or lesions  EYES: NEGATIVE for vision changes or irritation  ENT/MOUTH: NEGATIVE for ear, mouth and throat problems  RESP: NEGATIVE for significant cough or SOB  BREAST: NEGATIVE for masses, tenderness or discharge  CV: NEGATIVE for chest pain, palpitations or peripheral edema  GI: NEGATIVE for nausea, abdominal pain, heartburn, or change in bowel habits  : NEGATIVE for frequency, dysuria, or hematuria  MUSCULOSKELETAL: NEGATIVE for significant arthralgias or myalgia  NEURO: NEGATIVE for weakness, dizziness or paresthesias  ENDOCRINE: NEGATIVE for temperature intolerance, skin/hair changes  HEME: NEGATIVE for bleeding problems  PSYCHIATRIC: NEGATIVE for changes in mood or  "affect     Objective    Exam  LMP 12/28/2015 (Approximate)    Estimated body mass index is 40.61 kg/m  as calculated from the following:    Height as of 10/23/23: 1.537 m (5' 0.5\").    Weight as of 10/23/23: 95.9 kg (211 lb 6.4 oz).    Physical Exam  GENERAL: alert and no distress  EYES: Eyes grossly normal to inspection, PERRL and conjunctivae and sclerae normal  HENT: ear canals and TM's normal, nose and mouth without ulcers or lesions  NECK: no adenopathy, no asymmetry, masses, or scars  RESP: lungs clear to auscultation - no rales, rhonchi or wheezes  BREAST: normal without masses, tenderness or nipple discharge and no palpable axillary masses or adenopathy  CV: regular rate and rhythm, normal S1 S2, no S3 or S4, no murmur, click or rub, no peripheral edema  ABDOMEN: soft, nontender, no hepatosplenomegaly, no masses and bowel sounds normal  MS: no gross musculoskeletal defects noted, no edema  SKIN: no suspicious lesions or rashes  NEURO: Normal strength and tone, mentation intact and speech normal  PSYCH: mentation appears normal, affect normal/bright        Signed Electronically by: Mable Cardoso MD    "

## 2024-12-09 NOTE — PATIENT INSTRUCTIONS
A Diabetes:  http://www.fpanetwork.org/diabetes    Check this site for wegovy  Sincerely,  Mable Cardoso MD    Patient Education   Preventive Care Advice   This is general advice given by our system to help you stay healthy. However, your care team may have specific advice just for you. Please talk to your care team about your preventive care needs.  Nutrition  Eat 5 or more servings of fruits and vegetables each day.  Try wheat bread, brown rice and whole grain pasta (instead of white bread, rice, and pasta).  Get enough calcium and vitamin D. Check the label on foods and aim for 100% of the RDA (recommended daily allowance).  Lifestyle  Exercise at least 150 minutes each week  (30 minutes a day, 5 days a week).  Do muscle strengthening activities 2 days a week. These help control your weight and prevent disease.  No smoking.  Wear sunscreen to prevent skin cancer.  Have a dental exam and cleaning every 6 months.  Yearly exams  See your health care team every year to talk about:  Any changes in your health.  Any medicines your care team has prescribed.  Preventive care, family planning, and ways to prevent chronic diseases.  Shots (vaccines)   HPV shots (up to age 26), if you've never had them before.  Hepatitis B shots (up to age 59), if you've never had them before.  COVID-19 shot: Get this shot when it's due.  Flu shot: Get a flu shot every year.  Tetanus shot: Get a tetanus shot every 10 years.  Pneumococcal, hepatitis A, and RSV shots: Ask your care team if you need these based on your risk.  Shingles shot (for age 50 and up)  General health tests  Diabetes screening:  Starting at age 35, Get screened for diabetes at least every 3 years.  If you are younger than age 35, ask your care team if you should be screened for diabetes.  Cholesterol test: At age 39, start having a cholesterol test every 5 years, or more often if advised.  Bone density scan (DEXA): At age 50, ask your care team if you should have this  scan for osteoporosis (brittle bones).  Hepatitis C: Get tested at least once in your life.  STIs (sexually transmitted infections)  Before age 24: Ask your care team if you should be screened for STIs.  After age 24: Get screened for STIs if you're at risk. You are at risk for STIs (including HIV) if:  You are sexually active with more than one person.  You don't use condoms every time.  You or a partner was diagnosed with a sexually transmitted infection.  If you are at risk for HIV, ask about PrEP medicine to prevent HIV.  Get tested for HIV at least once in your life, whether you are at risk for HIV or not.  Cancer screening tests  Cervical cancer screening: If you have a cervix, begin getting regular cervical cancer screening tests starting at age 21.  Breast cancer scan (mammogram): If you've ever had breasts, begin having regular mammograms starting at age 40. This is a scan to check for breast cancer.  Colon cancer screening: It is important to start screening for colon cancer at age 45.  Have a colonoscopy test every 10 years (or more often if you're at risk) Or, ask your provider about stool tests like a FIT test every year or Cologuard test every 3 years.  To learn more about your testing options, visit:   .  For help making a decision, visit:   https://bit.ly/ir30762.  Prostate cancer screening test: If you have a prostate, ask your care team if a prostate cancer screening test (PSA) at age 55 is right for you.  Lung cancer screening: If you are a current or former smoker ages 50 to 80, ask your care team if ongoing lung cancer screenings are right for you.  For informational purposes only. Not to replace the advice of your health care provider. Copyright   2023 [employer]. All rights reserved. Clinically reviewed by the [facility]. Jounce 825376 - REV 01/24.  Preventing Falls: Care Instructions  Injuries and health problems such as trouble walking or poor  eyesight can increase your risk of falling. So can some medicines. But there are things you can do to help prevent falls. You can exercise to get stronger. You can also arrange your home to make it safer.    Talk to your doctor about the medicines you take. Ask if any of them increase the risk of falls and whether they can be changed or stopped.   Try to exercise regularly. It can help improve your strength and balance. This can help lower your risk of falling.         Practice fall safety and prevention.   Wear low-heeled shoes that fit well and give your feet good support. Talk to your doctor if you have foot problems that make this hard.  Carry a cellphone or wear a medical alert device that you can use to call for help.  Use stepladders instead of chairs to reach high objects. Don't climb if you're at risk for falls. Ask for help, if needed.  Wear the correct eyeglasses, if you need them.        Make your home safer.   Remove rugs, cords, clutter, and furniture from walkways.  Keep your house well lit. Use night-lights in hallways and bathrooms.  Install and use sturdy handrails on stairways.  Wear nonskid footwear, even inside. Don't walk barefoot or in socks without shoes.        Be safe outside.   Use handrails, curb cuts, and ramps whenever possible.  Keep your hands free by using a shoulder bag or backpack.  Try to walk in well-lit areas. Watch out for uneven ground, changes in pavement, and debris.  Be careful in the winter. Walk on the grass or gravel when sidewalks are slippery. Use de-icer on steps and walkways. Add non-slip devices to shoes.    Put grab bars and nonskid mats in your shower or tub and near the toilet. Try to use a shower chair or bath bench when bathing.   Get into a tub or shower by putting in your weaker leg first. Get out with your strong side first. Have a phone or medical alert device in the bathroom with you.   Where can you learn more?  Go to  "https://www.[facility].net/patiented  Enter G117 in the search box to learn more about \"Preventing Falls: Care Instructions.\"  Current as of: July 17, 2023  Content Version: 14.2 2024 [employer].   Care instructions adapted under license by your healthcare professional. If you have questions about a medical condition or this instruction, always ask your healthcare professional. Healthwise, Incorporated disclaims any warranty or liability for your use of this information.    Safer Sex: Care Instructions  Overview  Safer sex is a way to reduce your risk of getting a sexually transmitted infection (STI). It can also help prevent pregnancy.  Several products can help you practice safer sex and reduce your chance of STIs. One of the best is a condom. There are internal and external condoms. You can use a special rubber sheet (dental dam) for protection during oral sex. Disposable gloves can keep your hands from touching blood, semen, or other body fluids that can carry infections.  Remember that birth control methods such as diaphragms, IUDs, foams, and birth control pills do not stop you from getting STIs.  Follow-up care is a key part of your treatment and safety. Be sure to make and go to all appointments, and call your doctor if you are having problems. It's also a good idea to know your test results and keep a list of the medicines you take.  How can you care for yourself at home?  Think about getting vaccinated to help prevent hepatitis A, hepatitis B, and human papillomavirus (HPV). They can be spread through sex.  Use a condom every time you have sex. Use an external condom, which goes on the penis. Or use an internal condom, which goes into the vagina or anus.  Make sure you use the right size external condom. A condom that's too small can break easily. A condom that's too big can slip off during sex.  Use a new condom each time you have sex. Be careful not to poke a hole in the condom when you " "open the wrapper.  Don't use an internal condom and an external condom at the same time.  Never use petroleum jelly (such as Vaseline), grease, hand lotion, baby oil, or anything with oil in it. These products can make holes in the condom.  After intercourse, hold the edge of the condom as you remove it. This will help keep semen from spilling out of the condom.  Do not have sex with anyone who has symptoms of an STI, such as sores on the genitals or mouth.  Do not drink a lot of alcohol or use drugs before sex.  Limit your sex partners. Sex with one partner who has sex only with you can reduce your risk of getting an STI.  Don't share sex toys. But if you do share them, use a condom and clean the sex toys between each use.  Talk to any partners before you have sex. Talk about what you feel comfortable with and whether you have any boundaries with sex. And find out if your partner or partners may be at risk for any STI. Keep in mind that a person may be able to spread an STI even if they do not have symptoms. You and any partners may want to get tested for STIs.  Where can you learn more?  Go to https://www.[employer].net/patiented  Enter B608 in the search box to learn more about \"Safer Sex: Care Instructions.\"  Current as of: November 27, 2023  Content Version: 14.2 2024 [facility] [employer].   Care instructions adapted under license by your healthcare professional. If you have questions about a medical condition or this instruction, always ask your healthcare professional. Healthwise, Incorporated disclaims any warranty or liability for your use of this information.     Learning About Risk for Heart Attack and Stroke (01:56)  Your health professional recommends that you watch this short online health video.  Learn what raises your risk for having a heart attack or stroke and how you can lower your risk.   Purpose: Explains risk factors for heart attack and stroke and ways to lower the risk.  Goal: Users will " understand what it means to be at risk for having a heart attack or stroke and what they can do to reduce their risk.    Watch: Scan the QR code or visit the link to view video       https://hwi.se/[email or web address]  Current as of: June 24, 2023  Content Version: 14.2 2024 [facility] [employer].   Care instructions adapted under license by your healthcare professional. If you have questions about a medical condition or this instruction, always ask your healthcare professional. Healthwise, Incorporated disclaims any warranty or liability for your use of this information.    Eating Healthy Foods: Care Instructions  With every meal, you can make healthy food choices. Try to eat a variety of fruits, vegetables, whole grains, lean proteins, and low-fat dairy products. This can help you get the right balance of nutrients, including vitamins and minerals. Small changes add up over time. You can start by adding one healthy food to your meals each day.    Try to make half your plate fruits and vegetables, one-fourth whole grains, and one-fourth lean proteins. Try including dairy with your meals.   Eat more fruits and vegetables. Try to have them with most meals and snacks.   Foods for healthy eating        Fruits   These can be fresh, frozen, canned, or dried.  Try to choose whole fruit rather than fruit juice.  Eat a variety of colors.        Vegetables   These can be fresh, frozen, canned, or dried.  Beans, peas, and lentils count too.        Whole grains   Choose whole-grain breads, cereals, and noodles.  Try brown rice.        Lean proteins   These can include lean meat, poultry, fish, and eggs.  You can also have tofu, beans, peas, lentils, nuts, and seeds.        Dairy   Try milk, yogurt, and cheese.  Choose low-fat or fat-free when you can.  If you need to, use lactose-free milk or fortified plant-based milk products, such as soy milk.        Water   Drink water when you're thirsty.  Limit sugar-sweetened drinks,  "including soda, fruit drinks, and sports drinks.  Where can you learn more?  Go to https://www.[employer].net/patiented  Enter T756 in the search box to learn more about \"Eating Healthy Foods: Care Instructions.\"  Current as of: September 20, 2023  Content Version: 14.2 2024 [facility] [employer].   Care instructions adapted under license by your healthcare professional. If you have questions about a medical condition or this instruction, always ask your healthcare professional. Healthwise, Incorporated disclaims any warranty or liability for your use of this information.    Body Mass Index: Care Instructions  Overview     Body mass index (BMI) can help you see if your weight is raising your risk for health problems. It uses a formula to compare how much you weigh with how tall you are.  A BMI lower than 18.5 is considered underweight.  A BMI between 18.5 and 24.9 is considered healthy.  A BMI between 25 and 29.9 is considered overweight. A BMI of 30 or higher is considered obese.  If your BMI is in the normal range, it means that you have a lower risk for weight-related health problems. If your BMI is in the overweight or obese range, you may be at increased risk for weight-related health problems, such as high blood pressure, heart disease, stroke, arthritis or joint pain, and diabetes. If your BMI is in the underweight range, you may be at increased risk for health problems such as fatigue, lower protection (immunity) against illness, muscle loss, bone loss, hair loss, and hormone problems.  BMI is just one measure of your risk for weight-related health problems. You may be at higher risk for health problems if you are not active, you eat an unhealthy diet, or you drink too much alcohol or use tobacco products.  Follow-up care is a key part of your treatment and safety. Be sure to make and go to all appointments, and call your doctor if you are having problems. It's also a good idea to know your test results " "and keep a list of the medicines you take.  How can you care for yourself at home?  Practice healthy eating habits. This includes eating plenty of fruits, vegetables, whole grains, lean protein, and low-fat dairy.  If your doctor recommends it, get more exercise. Walking is a good choice. Bit by bit, increase the amount you walk every day. Try for at least 30 minutes on most days of the week.  Do not smoke. Smoking can increase your risk for health problems. If you need help quitting, talk to your doctor about stop-smoking programs and medicines. These can increase your chances of quitting for good.  Limit alcohol to 2 drinks a day for men and 1 drink a day for women. Too much alcohol can cause health problems.  If you have a BMI higher than 25  Your doctor may do other tests to check your risk for weight-related health problems. This may include measuring the distance around your waist. A waist measurement of more than 40 inches in men or 35 inches in women can increase the risk of weight-related health problems.  Talk with your doctor about steps you can take to stay healthy or improve your health. You may need to make lifestyle changes to lose weight and stay healthy, such as changing your diet and getting regular exercise.  If you have a BMI lower than 18.5  Your doctor may do other tests to check your risk for health problems.  Talk with your doctor about steps you can take to stay healthy or improve your health. You may need to make lifestyle changes to gain or maintain weight and stay healthy, such as getting more healthy foods in your diet and doing exercises to build muscle.  Where can you learn more?  Go to https://www.healthFuel3D.net/patiented  Enter S176 in the search box to learn more about \"Body Mass Index: Care Instructions.\"  Current as of: May 13, 2023  Content Version: 14.2 2024 [facility] [employer].   Care instructions adapted under license by your healthcare professional. If you have " "questions about a medical condition or this instruction, always ask your healthcare professional. Healthwise, [facility] disclaims any warranty or liability for your use of this information.    Learning About Being Physically Active  What is physical activity?     Being physically active means doing any kind of activity that gets your body moving.  The types of physical activity that can help you get fit and stay healthy include:  Aerobic or \"cardio\" activities. These make your heart beat faster and make you breathe harder, such as brisk walking, riding a bike, or running. They strengthen your heart and lungs and build up your endurance.  Strength training activities. These make your muscles work against, or \"resist,\" something. Examples include lifting weights or doing push-ups. These activities help tone and strengthen your muscles and bones.  Stretches. These let you move your joints and muscles through their full range of motion. Stretching helps you be more flexible.  Reaching a balance between these three types of physical activity is important because each one contributes to your overall fitness.  What are the benefits of being active?  Being active is one of the best things you can do for your health. It helps you to:  Feel stronger and have more energy to do all the things you like to do.  Focus better at school or work.  Feel, think, and sleep better.  Reach and stay at a healthy weight.  Lose fat and build lean muscle.  Lower your risk for serious health problems, including diabetes, heart attack, high blood pressure, and some cancers.  Keep your heart, lungs, bones, muscles, and joints strong and healthy.  How can you make being active part of your life?  Start slowly. Make it your long-term goal to get at least 30 minutes of exercise on most days of the week. Walking is a good choice. You also may want to do other activities, such as running, swimming, cycling, or playing tennis or team sports.  Pick " "activities that you like--ones that make your heart beat faster, your muscles stronger, and your muscles and joints more flexible. If you find more than one thing you like doing, do them all. You don't have to do the same thing every day.  Get your heart pumping every day. Any activity that makes your heart beat faster and keeps it at that rate for a while counts.  Here are some great ways to get your heart beating faster:  Go for a brisk walk, run, or hike.  Go for a swim or bike ride.  Take an online exercise class or dance.  Play a game of touch football, basketball, or soccer.  Play tennis, pickleball, or racquetball.  Climb stairs.  Even some household chores can be aerobic. Just do them at a faster pace. Raking or mowing the lawn, sweeping the garage, and vacuuming and cleaning your home all can help get your heart rate up.  Strengthen your muscles during the week. You don't have to lift heavy weights or grow big, bulky muscles to get stronger. Doing a few simple activities that make your muscles work against, or \"resist,\" something can help you get stronger. Aim for at least twice a week.  For example, you can:  Do push-ups or sit-ups, which use your own body weight as resistance.  Lift weights or dumbbells or use stretch bands at home or in a gym or community center.  Stretch your muscles often. Stretching will help you as you become more active. It can help you stay flexible and loosen tight muscles. It can also help improve your balance and posture and can be a great way to relax.  Be sure to stretch the muscles you'll be using when you work out. It's best to warm your muscles slightly before you stretch them. Walk or do some other light aerobic activity for a few minutes. Then start stretching.  When you stretch your muscles:  Do it slowly. Stretching is not about going fast or making sudden movements.  Don't push or bounce during a stretch.  Hold each stretch for at least 15 to 30 seconds, if you can. " "You should feel a stretch in the muscle, but not pain.  Breathe out as you do the stretch. Then breathe in as you hold the stretch. Don't hold your breath.  If you're worried about how more activity might affect your health, have a checkup before you start. Follow any special advice your doctor gives you for getting a smart start.  Where can you learn more?  Go to https://www.[employer].net/patiented  Enter W332 in the search box to learn more about \"Learning About Being Physically Active.\"  Current as of: June 5, 2023  Content Version: 14.2 2024 [facility] [employer].   Care instructions adapted under license by your healthcare professional. If you have questions about a medical condition or this instruction, always ask your healthcare professional. Healthwise, Incorporated disclaims any warranty or liability for your use of this information.       "

## 2024-12-10 LAB
ALBUMIN SERPL BCG-MCNC: 4 G/DL (ref 3.5–5.2)
ALP SERPL-CCNC: 76 U/L (ref 40–150)
ALT SERPL W P-5'-P-CCNC: 26 U/L (ref 0–50)
ANION GAP SERPL CALCULATED.3IONS-SCNC: 10 MMOL/L (ref 7–15)
AST SERPL W P-5'-P-CCNC: 31 U/L (ref 0–45)
BILIRUB SERPL-MCNC: 0.2 MG/DL
BUN SERPL-MCNC: 18.8 MG/DL (ref 8–23)
CALCIUM SERPL-MCNC: 9.3 MG/DL (ref 8.8–10.4)
CHLORIDE SERPL-SCNC: 100 MMOL/L (ref 98–107)
CHOLEST SERPL-MCNC: 207 MG/DL
CREAT SERPL-MCNC: 0.51 MG/DL (ref 0.51–0.95)
EGFRCR SERPLBLD CKD-EPI 2021: >90 ML/MIN/1.73M2
FASTING STATUS PATIENT QL REPORTED: NO
FASTING STATUS PATIENT QL REPORTED: NO
GLUCOSE SERPL-MCNC: 92 MG/DL (ref 70–99)
HCO3 SERPL-SCNC: 26 MMOL/L (ref 22–29)
HDLC SERPL-MCNC: 69 MG/DL
LDLC SERPL CALC-MCNC: 117 MG/DL
NONHDLC SERPL-MCNC: 138 MG/DL
POTASSIUM SERPL-SCNC: 4 MMOL/L (ref 3.4–5.3)
PROT SERPL-MCNC: 7.1 G/DL (ref 6.4–8.3)
SODIUM SERPL-SCNC: 136 MMOL/L (ref 135–145)
TRIGL SERPL-MCNC: 104 MG/DL

## 2024-12-11 ENCOUNTER — TELEPHONE (OUTPATIENT)
Dept: FAMILY MEDICINE | Facility: CLINIC | Age: 59
End: 2024-12-11
Payer: COMMERCIAL

## 2024-12-11 NOTE — TELEPHONE ENCOUNTER
Prior Authorization Retail Medication Request    Medication/Dose: Wegovy 0.25mg/0.5ML Auto Injectors  Diagnosis and ICD code (if different than what is on RX):  Class 3 Severe Obesity  New/renewal/insurance change PA/secondary ins. PA: New      Insurance   Primary: [employer]  Insurance ID:  [id number]    Key: [id number]  Patient Last Name: Ankush  : 1965    Pharmacy Information (if different than what is on RX)  Name:  [facility]  Phone:  [phone]    Clinic Information  Preferred routing pool for dept communication

## 2024-12-14 NOTE — TELEPHONE ENCOUNTER
PRIOR AUTHORIZATION DENIED    Medication: WEGOVY 0.25 MG/0.5ML SC SOAJ    Insurance Company: [employer] ([facility]) - Phone [phone] Fax [phone]    Denial Date: 12/14/2024    Denial Reason(s): Excluded

## 2024-12-14 NOTE — TELEPHONE ENCOUNTER
PA Initiation    Medication: WEGOVY 0.25 MG/0.5ML SC SOAJ  Insurance Company: [employer] ([facility]) - Phone [phone] Fax [phone]  Pharmacy Filling the Rx: [facility] [address] [place] [id number] [facility]  Filling Pharmacy Phone: [phone]  Filling Pharmacy Fax: [phone]  Start Date: 12/13/2024

## 2024-12-17 DIAGNOSIS — J31.0 CHRONIC RHINITIS: ICD-10-CM

## 2024-12-17 RX ORDER — AZELASTINE HYDROCHLORIDE 137 UG/1
SPRAY, METERED NASAL
Qty: 30 ML | Refills: 0 | Status: SHIPPED | OUTPATIENT
Start: 2024-12-17

## 2025-01-05 DIAGNOSIS — I10 BENIGN ESSENTIAL HYPERTENSION: ICD-10-CM

## 2025-01-06 RX ORDER — HYDROCHLOROTHIAZIDE 25 MG/1
12.5 TABLET ORAL DAILY
Qty: 30 TABLET | Refills: 0 | Status: SHIPPED | OUTPATIENT
Start: 2025-01-06 | End: 2025-01-07

## 2025-01-07 ENCOUNTER — TELEPHONE (OUTPATIENT)
Dept: FAMILY MEDICINE | Facility: CLINIC | Age: 60
End: 2025-01-07
Payer: COMMERCIAL

## 2025-01-07 DIAGNOSIS — I10 BENIGN ESSENTIAL HYPERTENSION: ICD-10-CM

## 2025-01-07 NOTE — TELEPHONE ENCOUNTER
Called patient. Left voice message to return call at [phone].    STEVIE Pedersen RN  [facility]

## 2025-01-07 NOTE — TELEPHONE ENCOUNTER
Pt is returning call. Relayed message from Dr. Cardoso. Pt states that she will skip the Wegovy for now. She does not want to proceed with the compounded medication. States that she reviewed the medication and did not like the side effects. She had received letter from her insurance with the denial.    Natali Lozano RN

## 2025-01-07 NOTE — TELEPHONE ENCOUNTER
Pt states that somehow her prescription instructions for hydrochlorothiazide got changed at 12/9/24 visit. She states that she has always taken 1 full tablet (25 mg) and is wondering if new prescription can be sent for dosing to take 1 tablet daily. She also requests a 90 day supply. Med pended. Please review/sign or advise.    Natali Lozano RN

## 2025-01-07 NOTE — TELEPHONE ENCOUNTER
Please let Pt Know about Denial  We can Try compounded WEgovy but this will only be available till  there is a shortage

## 2025-01-08 RX ORDER — HYDROCHLOROTHIAZIDE 25 MG/1
25 TABLET ORAL DAILY
Qty: 90 TABLET | Refills: 1 | Status: SHIPPED | OUTPATIENT
Start: 2025-01-08

## 2025-01-14 ENCOUNTER — LAB (OUTPATIENT)
Dept: LAB | Facility: CLINIC | Age: 60
End: 2025-01-14
Payer: COMMERCIAL

## 2025-01-14 DIAGNOSIS — N39.0 URINARY TRACT INFECTION, SITE NOT SPECIFIED: Primary | ICD-10-CM

## 2025-01-14 LAB
ALBUMIN UR-MCNC: NEGATIVE MG/DL
APPEARANCE UR: CLEAR
BACTERIA #/AREA URNS HPF: ABNORMAL /HPF
BILIRUB UR QL STRIP: NEGATIVE
COLOR UR AUTO: YELLOW
GLUCOSE UR STRIP-MCNC: NEGATIVE MG/DL
HGB UR QL STRIP: NEGATIVE
KETONES UR STRIP-MCNC: NEGATIVE MG/DL
LEUKOCYTE ESTERASE UR QL STRIP: ABNORMAL
NITRATE UR QL: NEGATIVE
PH UR STRIP: 6.5 [PH] (ref 5–7)
RBC #/AREA URNS AUTO: ABNORMAL /HPF
SP GR UR STRIP: 1.01 (ref 1–1.03)
UROBILINOGEN UR STRIP-ACNC: 0.2 E.U./DL
WBC #/AREA URNS AUTO: ABNORMAL /HPF

## 2025-01-14 PROCEDURE — 81001 URINALYSIS AUTO W/SCOPE: CPT

## 2025-02-04 ENCOUNTER — OFFICE VISIT (OUTPATIENT)
Dept: FAMILY MEDICINE | Facility: CLINIC | Age: 60
End: 2025-02-04
Payer: COMMERCIAL

## 2025-02-04 VITALS
HEIGHT: 60 IN | BODY MASS INDEX: 42.21 KG/M2 | SYSTOLIC BLOOD PRESSURE: 136 MMHG | WEIGHT: 215 LBS | HEART RATE: 89 BPM | RESPIRATION RATE: 18 BRPM | DIASTOLIC BLOOD PRESSURE: 84 MMHG | TEMPERATURE: 96.4 F | OXYGEN SATURATION: 97 %

## 2025-02-04 DIAGNOSIS — I10 BENIGN ESSENTIAL HYPERTENSION: ICD-10-CM

## 2025-02-04 DIAGNOSIS — E66.01 CLASS 3 SEVERE OBESITY DUE TO EXCESS CALORIES WITH SERIOUS COMORBIDITY AND BODY MASS INDEX (BMI) OF 40.0 TO 44.9 IN ADULT (H): ICD-10-CM

## 2025-02-04 DIAGNOSIS — F43.23 SITUATIONAL MIXED ANXIETY AND DEPRESSIVE DISORDER: ICD-10-CM

## 2025-02-04 DIAGNOSIS — E87.6 HYPOKALEMIA: ICD-10-CM

## 2025-02-04 DIAGNOSIS — N30.00 ACUTE CYSTITIS WITHOUT HEMATURIA: Primary | ICD-10-CM

## 2025-02-04 DIAGNOSIS — E66.813 CLASS 3 SEVERE OBESITY DUE TO EXCESS CALORIES WITH SERIOUS COMORBIDITY AND BODY MASS INDEX (BMI) OF 40.0 TO 44.9 IN ADULT (H): ICD-10-CM

## 2025-02-04 LAB
ALBUMIN UR-MCNC: NEGATIVE MG/DL
APPEARANCE UR: CLEAR
BILIRUB UR QL STRIP: NEGATIVE
COLOR UR AUTO: YELLOW
GLUCOSE UR STRIP-MCNC: NEGATIVE MG/DL
HGB UR QL STRIP: NEGATIVE
KETONES UR STRIP-MCNC: ABNORMAL MG/DL
LEUKOCYTE ESTERASE UR QL STRIP: NEGATIVE
NITRATE UR QL: NEGATIVE
PH UR STRIP: 5.5 [PH] (ref 5–7)
POTASSIUM SERPL-SCNC: 4.4 MMOL/L (ref 3.4–5.3)
SP GR UR STRIP: 1.02 (ref 1–1.03)
UROBILINOGEN UR STRIP-ACNC: 0.2 E.U./DL

## 2025-02-04 PROCEDURE — 36415 COLL VENOUS BLD VENIPUNCTURE: CPT | Performed by: FAMILY MEDICINE

## 2025-02-04 PROCEDURE — G2211 COMPLEX E/M VISIT ADD ON: HCPCS | Performed by: FAMILY MEDICINE

## 2025-02-04 PROCEDURE — 99213 OFFICE O/P EST LOW 20 MIN: CPT | Performed by: FAMILY MEDICINE

## 2025-02-04 PROCEDURE — 81003 URINALYSIS AUTO W/O SCOPE: CPT | Performed by: FAMILY MEDICINE

## 2025-02-04 PROCEDURE — 84132 ASSAY OF SERUM POTASSIUM: CPT | Performed by: FAMILY MEDICINE

## 2025-02-04 RX ORDER — AMLODIPINE BESYLATE 2.5 MG/1
2.5 TABLET ORAL DAILY
Qty: 90 TABLET | Refills: 3 | Status: SHIPPED | OUTPATIENT
Start: 2025-02-04

## 2025-02-04 RX ORDER — POTASSIUM CHLORIDE 750 MG/1
TABLET, EXTENDED RELEASE ORAL
Qty: 90 TABLET | Refills: 3 | Status: SHIPPED | OUTPATIENT
Start: 2025-02-04

## 2025-02-04 RX ORDER — LOSARTAN POTASSIUM 100 MG/1
100 TABLET ORAL DAILY
Qty: 90 TABLET | Refills: 3 | Status: SHIPPED | OUTPATIENT
Start: 2025-02-04

## 2025-02-04 RX ORDER — CITALOPRAM HYDROBROMIDE 20 MG/1
TABLET ORAL
Qty: 90 TABLET | Refills: 1 | Status: SHIPPED | OUTPATIENT
Start: 2025-02-04

## 2025-02-04 RX ORDER — HYDROCHLOROTHIAZIDE 25 MG/1
25 TABLET ORAL DAILY
Qty: 90 TABLET | Refills: 3 | Status: SHIPPED | OUTPATIENT
Start: 2025-02-04

## 2025-02-04 RX ORDER — HYDRALAZINE HYDROCHLORIDE 25 MG/1
TABLET, FILM COATED ORAL
Qty: 360 TABLET | Refills: 3 | Status: SHIPPED | OUTPATIENT
Start: 2025-02-04

## 2025-02-04 RX ORDER — CARVEDILOL PHOSPHATE 40 MG/1
CAPSULE, EXTENDED RELEASE ORAL
Qty: 90 CAPSULE | Refills: 3 | Status: SHIPPED | OUTPATIENT
Start: 2025-02-04

## 2025-02-04 ASSESSMENT — PAIN SCALES - GENERAL: PAINLEVEL_OUTOF10: NO PAIN (0)

## 2025-02-04 NOTE — PROGRESS NOTES
Assessment & Plan     Class 3 severe obesity due to excess calories with serious comorbidity and body mass index (BMI) of 40.0 to 44.9 in adult (H)  Discussed Losing wt will help HTN  Pt does not want GLP1   She wants to try on her own    Benign essential hypertension  Stable at Home-see Home readings below   - hydrALAZINE (APRESOLINE) 25 MG tablet; TAKE 1 TABLET BY MOUTH 4 TIMES DAILY FOR HIGH BLOOD PRESSURE  - carvedilol ER (COREG CR) 40 MG 24 hr capsule; Take 1 capsule by mouth once daily  - amLODIPine (NORVASC) 2.5 MG tablet; Take 1 tablet (2.5 mg) by mouth daily.  - hydrochlorothiazide (HYDRODIURIL) 25 MG tablet; Take 1 tablet (25 mg) by mouth daily.  - losartan (COZAAR) 100 MG tablet; Take 1 tablet (100 mg) by mouth daily.  - Potassium; Future  - Potassium    Situational mixed anxiety and depressive disorder  Refilled  Doing well  - citalopram (CELEXA) 20 MG tablet; TAKE 1 TABLET BY MOUTH ONCE DAILY .    Hypokalemia  Refilled   - potassium chloride marisol ER (KLOR-CON M10) 10 MEQ CR tablet; Take 1 tablet by mouth once daily    Acute cystitis without hematuria  Pt recently had a UTI  Wants repeat Urine check  Has Urinary Frequency and some urgency  - UA Macroscopic with reflex to Microscopic and Culture - Lab Collect; Future  - UA Macroscopic with reflex to Microscopic and Culture - Lab Collect    The longitudinal plan of care for the diagnosis(es)/condition(s) as documented were addressed during this visit. Due to the added complexity in care, I will continue to support Juanita in the subsequent management and with ongoing continuity of care.    Decrease caffeine  Subjective   Juanita is a 59 year old, presenting for the following health issues:  Hypertension and Urinary Problem        2/4/2025    12:13 PM   Additional Questions   Roomed by Barbi     History of Present Illness       Hypertension: She presents for follow up of hypertension.  She does check blood pressure  regularly outside of the clinic.  "Outpatient blood pressures have not been over 140/90. She follows a low salt diet.     She eats 2-3 servings of fruits and vegetables daily.She consumes 0 sweetened beverage(s) daily.She exercises with enough effort to increase her heart rate 9 or less minutes per day.  She exercises with enough effort to increase her heart rate 3 or less days per week.   She is taking medications regularly.         Genitourinary - Female  Onset/Duration: 4 days ago  Description:   Painful urination (Dysuria): No           Frequency: YES  Blood in urine (Hematuria): No  Delay in urine (Hesitency): No  Progression of Symptoms:  improving  Accompanying Signs & Symptoms:  Fever/chills: No  Flank pain: No  Nausea and vomiting: No  Vaginal symptoms: none  Abdominal/Pelvic Pain: No  History:   History of frequent UTI s: No, but just finished antibiotic for UTI   History of kidney stones: No  Sexually Active: YES  Possibility of pregnancy: No  Precipitating or alleviating factors: None  Therapies tried and outcome: Cranberry juice prn (contraindicated in Coumadin patients)        Review of Systems  Rest of the ROS is Negative except see above and Problem list [stable]        Objective    /84   Pulse 89   Temp (!) 96.4  F (35.8  C) (Temporal)   Resp 18   Ht 1.529 m (5' 0.2\")   Wt 97.5 kg (215 lb)   LMP 12/28/2015 (Approximate)   SpO2 97%   BMI 41.71 kg/m    Body mass index is 41.71 kg/m .  Physical Exam   GENERAL: alert and no distress  NECK: no adenopathy, no asymmetry, masses, or scars  RESP: lungs clear to auscultation - no rales, rhonchi or wheezes  CV: regular rate and rhythm, normal S1 S2, no S3 or S4, no murmur, click or rub, no peripheral edema  ABDOMEN: soft, nontender, no hepatosplenomegaly, no masses and bowel sounds normal  MS: no gross musculoskeletal defects noted, no edema    Lab on 01/14/2025   Component Date Value Ref Range Status    Color Urine 01/14/2025 Yellow  Colorless, Straw, Light Yellow, Yellow Final "    Appearance Urine 01/14/2025 Clear  Clear Final    Glucose Urine 01/14/2025 Negative  Negative mg/dL Final    Bilirubin Urine 01/14/2025 Negative  Negative Final    Ketones Urine 01/14/2025 Negative  Negative mg/dL Final    Specific Gravity Urine 01/14/2025 1.010  1.003 - 1.035 Final    Blood Urine 01/14/2025 Negative  Negative Final    pH Urine 01/14/2025 6.5  5.0 - 7.0 Final    Protein Albumin Urine 01/14/2025 Negative  Negative mg/dL Final    Urobilinogen Urine 01/14/2025 0.2  0.2, 1.0 E.U./dL Final    Nitrite Urine 01/14/2025 Negative  Negative Final    Leukocyte Esterase Urine 01/14/2025 Trace (A)  Negative Final    Bacteria Urine 01/14/2025 Few (A)  None Seen /HPF Final    RBC Urine 01/14/2025 0-2  0-2 /HPF /HPF Final    WBC Urine 01/14/2025 0-5  0-5 /HPF /HPF Final     Results for orders placed or performed in visit on 02/04/25   UA Macroscopic with reflex to Microscopic and Culture - Lab Collect     Status: Abnormal    Specimen: Urine, Clean Catch   Result Value Ref Range    Color Urine Yellow Colorless, Straw, Light Yellow, Yellow    Appearance Urine Clear Clear    Glucose Urine Negative Negative mg/dL    Bilirubin Urine Negative Negative    Ketones Urine Trace (A) Negative mg/dL    Specific Gravity Urine 1.020 1.003 - 1.035    Blood Urine Negative Negative    pH Urine 5.5 5.0 - 7.0    Protein Albumin Urine Negative Negative mg/dL    Urobilinogen Urine 0.2 0.2, 1.0 E.U./dL    Nitrite Urine Negative Negative    Leukocyte Esterase Urine Negative Negative    Narrative    Microscopic not indicated           Signed Electronically by: Mable Cardoso MD

## 2025-02-11 DIAGNOSIS — J31.0 CHRONIC RHINITIS: ICD-10-CM

## 2025-02-11 DIAGNOSIS — J32.4 CHRONIC PANSINUSITIS: ICD-10-CM

## 2025-02-11 DIAGNOSIS — J33.9 NASAL POLYPOSIS: ICD-10-CM

## 2025-02-11 RX ORDER — AZELASTINE HYDROCHLORIDE 137 UG/1
SPRAY, METERED NASAL
Qty: 30 ML | Refills: 0 | Status: SHIPPED | OUTPATIENT
Start: 2025-02-11

## 2025-02-11 RX ORDER — FLUTICASONE PROPIONATE 50 MCG
2 SPRAY, SUSPENSION (ML) NASAL DAILY
Qty: 48 G | Refills: 0 | Status: SHIPPED | OUTPATIENT
Start: 2025-02-11

## 2025-03-02 DIAGNOSIS — J44.9 CHRONIC OBSTRUCTIVE PULMONARY DISEASE, UNSPECIFIED COPD TYPE (H): ICD-10-CM

## 2025-03-02 RX ORDER — MOMETASONE FUROATE AND FORMOTEROL FUMARATE DIHYDRATE 200; 5 UG/1; UG/1
AEROSOL RESPIRATORY (INHALATION)
Qty: 13 G | Refills: 0 | Status: SHIPPED | OUTPATIENT
Start: 2025-03-02

## 2025-04-22 DIAGNOSIS — J31.0 CHRONIC RHINITIS: ICD-10-CM

## 2025-04-22 RX ORDER — AZELASTINE HYDROCHLORIDE 137 UG/1
SPRAY, METERED NASAL
Qty: 30 ML | Refills: 0 | Status: SHIPPED | OUTPATIENT
Start: 2025-04-22

## 2025-05-04 DIAGNOSIS — J44.9 CHRONIC OBSTRUCTIVE PULMONARY DISEASE, UNSPECIFIED COPD TYPE (H): ICD-10-CM

## 2025-05-04 RX ORDER — MOMETASONE FUROATE AND FORMOTEROL FUMARATE DIHYDRATE 200; 5 UG/1; UG/1
AEROSOL RESPIRATORY (INHALATION)
Qty: 39 G | Refills: 0 | Status: SHIPPED | OUTPATIENT
Start: 2025-05-04

## 2025-06-06 PROBLEM — N62 HYPERTROPHY OF BREAST: Status: RESOLVED | Noted: 2018-03-23 | Resolved: 2025-06-06

## 2025-06-08 DIAGNOSIS — J33.9 NASAL POLYPOSIS: ICD-10-CM

## 2025-06-08 DIAGNOSIS — J31.0 CHRONIC RHINITIS: ICD-10-CM

## 2025-06-08 DIAGNOSIS — J32.4 CHRONIC PANSINUSITIS: ICD-10-CM

## 2025-06-08 RX ORDER — FLUTICASONE PROPIONATE 50 MCG
2 SPRAY, SUSPENSION (ML) NASAL DAILY
Qty: 48 G | Refills: 0 | Status: SHIPPED | OUTPATIENT
Start: 2025-06-08

## 2025-06-08 RX ORDER — AZELASTINE HYDROCHLORIDE 137 UG/1
SPRAY, METERED NASAL
Qty: 90 ML | Refills: 0 | Status: SHIPPED | OUTPATIENT
Start: 2025-06-08

## 2025-08-11 DIAGNOSIS — Z12.11 COLON CANCER SCREENING: ICD-10-CM

## (undated) DEVICE — SPONGE COTTONOID 1/2X3" 80-1407

## (undated) DEVICE — PACK MINOR SBA15MIFSE

## (undated) DEVICE — SUCTION CANISTER MEDIVAC LINER 1500ML W/LID 65651-515

## (undated) DEVICE — DECANTER TRANSFER DEVICE 2008S

## (undated) DEVICE — TRACKER ENT OTS PATIENT FUSION 9733534

## (undated) DEVICE — BLADE SHAVER SINUS 4.0MM RAD 40 ROTATE  1884006HR

## (undated) DEVICE — SOL WATER IRRIG 1000ML BOTTLE 07139-09

## (undated) DEVICE — NDL 19GA 1.5"

## (undated) DEVICE — TRACKER ENT OTS INSTRUMENT FUSION 9733533

## (undated) DEVICE — TRACKER PATIENT NON-INVASIVE AXIEM 9734887XOM

## (undated) DEVICE — NDL SPINAL 25GA 3.5" QUINCKE 405180

## (undated) DEVICE — DRAPE SPLIT EENT 76X124" 3X28" 9447

## (undated) DEVICE — SU CHROMIC 3-0 SH 27" G122H

## (undated) DEVICE — ENDO SHEATH STORZ SHARPSITE ENDOSCRUB 0DEG 4MM 1912000

## (undated) DEVICE — SPECIMEN CONTAINER 4OZ

## (undated) DEVICE — SOL NACL 0.9% INJ 1000ML BAG 07983-09

## (undated) DEVICE — BLADE SINUS RAD12 CVD 4MM FUSION ROTATE W/TRACKING

## (undated) DEVICE — GLOVE PROTEXIS W/NEU-THERA 7.5  2D73TE75

## (undated) DEVICE — SU CHROMIC 4-0 P-3 18" 1654G

## (undated) DEVICE — TUBING SUCTION 10'X3/16" N510

## (undated) DEVICE — BLADE TURBINATE INFERIOR 2MM ROTATE  1882040HR

## (undated) DEVICE — SU PROLENE 2-0 FS 18" 8685H

## (undated) DEVICE — SU PROLENE 2-0 SH 30" 8833H

## (undated) RX ORDER — LIDOCAINE HYDROCHLORIDE AND EPINEPHRINE BITARTRATE 20; .01 MG/ML; MG/ML
INJECTION, SOLUTION SUBCUTANEOUS
Status: DISPENSED
Start: 2018-10-09

## (undated) RX ORDER — GABAPENTIN 300 MG/1
CAPSULE ORAL
Status: DISPENSED
Start: 2018-10-09

## (undated) RX ORDER — DEXAMETHASONE SODIUM PHOSPHATE 4 MG/ML
INJECTION, SOLUTION INTRA-ARTICULAR; INTRALESIONAL; INTRAMUSCULAR; INTRAVENOUS; SOFT TISSUE
Status: DISPENSED
Start: 2018-10-09

## (undated) RX ORDER — ACETAMINOPHEN 325 MG/1
TABLET ORAL
Status: DISPENSED
Start: 2018-10-09

## (undated) RX ORDER — EPHEDRINE SULFATE 50 MG/ML
INJECTION, SOLUTION INTRAVENOUS
Status: DISPENSED
Start: 2018-10-09

## (undated) RX ORDER — FENTANYL CITRATE 50 UG/ML
INJECTION, SOLUTION INTRAMUSCULAR; INTRAVENOUS
Status: DISPENSED
Start: 2018-10-09

## (undated) RX ORDER — LIDOCAINE HYDROCHLORIDE AND EPINEPHRINE 10; 10 MG/ML; UG/ML
INJECTION, SOLUTION INFILTRATION; PERINEURAL
Status: DISPENSED
Start: 2018-10-09

## (undated) RX ORDER — CEFAZOLIN SODIUM 2 G/100ML
INJECTION, SOLUTION INTRAVENOUS
Status: DISPENSED
Start: 2018-10-09

## (undated) RX ORDER — LIDOCAINE HYDROCHLORIDE 20 MG/ML
INJECTION, SOLUTION EPIDURAL; INFILTRATION; INTRACAUDAL; PERINEURAL
Status: DISPENSED
Start: 2018-10-09